# Patient Record
Sex: MALE | Employment: FULL TIME | URBAN - METROPOLITAN AREA
[De-identification: names, ages, dates, MRNs, and addresses within clinical notes are randomized per-mention and may not be internally consistent; named-entity substitution may affect disease eponyms.]

---

## 2018-02-16 ENCOUNTER — TRANSFERRED RECORDS (OUTPATIENT)
Dept: HEALTH INFORMATION MANAGEMENT | Facility: CLINIC | Age: 53
End: 2018-02-16

## 2018-02-16 ENCOUNTER — MEDICAL CORRESPONDENCE (OUTPATIENT)
Dept: HEALTH INFORMATION MANAGEMENT | Facility: CLINIC | Age: 53
End: 2018-02-16

## 2018-02-22 ENCOUNTER — HOSPITAL ENCOUNTER (OUTPATIENT)
Dept: CARDIOLOGY | Facility: CLINIC | Age: 53
Discharge: HOME | End: 2018-02-22
Attending: FAMILY MEDICINE | Admitting: FAMILY MEDICINE
Payer: COMMERCIAL

## 2018-02-22 DIAGNOSIS — I71.21 ASCENDING AORTIC ANEURYSM (H): ICD-10-CM

## 2018-02-22 PROCEDURE — 93306 TTE W/DOPPLER COMPLETE: CPT | Mod: 26 | Performed by: INTERNAL MEDICINE

## 2018-02-22 PROCEDURE — 93306 TTE W/DOPPLER COMPLETE: CPT

## 2018-02-24 ENCOUNTER — MEDICAL CORRESPONDENCE (OUTPATIENT)
Dept: HEALTH INFORMATION MANAGEMENT | Facility: CLINIC | Age: 53
End: 2018-02-24

## 2018-03-01 DIAGNOSIS — I71.21 ASCENDING AORTIC ANEURYSM (H): Primary | ICD-10-CM

## 2018-03-01 PROBLEM — I10 HYPERTENSION: Status: ACTIVE | Noted: 2018-03-01

## 2018-03-01 PROBLEM — Q23.81 BICUSPID AORTIC VALVE: Status: ACTIVE | Noted: 2018-03-01

## 2018-03-01 PROBLEM — Q67.6 PECTUS EXCAVATUM: Status: ACTIVE | Noted: 2018-03-01

## 2018-03-01 RX ORDER — CLOBETASOL PROPIONATE 0.5 MG/G
1 CREAM TOPICAL 2 TIMES DAILY
COMMUNITY
Start: 2015-11-03 | End: 2023-10-24

## 2018-03-01 RX ORDER — ATENOLOL 50 MG/1
50 TABLET ORAL
COMMUNITY
Start: 2017-09-19 | End: 2019-11-01 | Stop reason: ALTCHOICE

## 2018-03-06 DIAGNOSIS — B19.20 HEPATITIS C VIRUS INFECTION WITHOUT HEPATIC COMA, UNSPECIFIED CHRONICITY: Primary | ICD-10-CM

## 2018-03-08 ENCOUNTER — HOSPITAL ENCOUNTER (OUTPATIENT)
Dept: MRI IMAGING | Facility: CLINIC | Age: 53
Discharge: HOME | End: 2018-03-08
Attending: INTERNAL MEDICINE | Admitting: INTERNAL MEDICINE
Payer: COMMERCIAL

## 2018-03-08 DIAGNOSIS — I71.21 ASCENDING AORTIC ANEURYSM (H): ICD-10-CM

## 2018-03-08 PROCEDURE — 71555 MRI ANGIO CHEST W OR W/O DYE: CPT | Mod: 26 | Performed by: INTERNAL MEDICINE

## 2018-03-08 PROCEDURE — 71555 MRI ANGIO CHEST W OR W/O DYE: CPT

## 2018-03-08 PROCEDURE — A9585 GADOBUTROL INJECTION: HCPCS | Performed by: INTERNAL MEDICINE

## 2018-03-08 PROCEDURE — 25000128 H RX IP 250 OP 636: Performed by: INTERNAL MEDICINE

## 2018-03-08 RX ORDER — GADOBUTROL 604.72 MG/ML
10 INJECTION INTRAVENOUS ONCE
Status: COMPLETED | OUTPATIENT
Start: 2018-03-08 | End: 2018-03-08

## 2018-03-08 RX ADMIN — GADOBUTROL 10 ML: 604.72 INJECTION INTRAVENOUS at 12:19

## 2018-03-08 RX ADMIN — GADOBUTROL 10 ML: 604.72 INJECTION INTRAVENOUS at 12:18

## 2018-03-20 ENCOUNTER — OFFICE VISIT (OUTPATIENT)
Dept: CARDIOLOGY | Facility: CLINIC | Age: 53
End: 2018-03-20
Attending: INTERNAL MEDICINE
Payer: COMMERCIAL

## 2018-03-20 VITALS
SYSTOLIC BLOOD PRESSURE: 136 MMHG | DIASTOLIC BLOOD PRESSURE: 90 MMHG | WEIGHT: 177 LBS | BODY MASS INDEX: 23.98 KG/M2 | HEART RATE: 84 BPM | HEIGHT: 72 IN | OXYGEN SATURATION: 98 %

## 2018-03-20 DIAGNOSIS — Q23.1 CONGENITAL INSUFFICIENCY OF AORTIC VALVE: ICD-10-CM

## 2018-03-20 DIAGNOSIS — I71.21 ASCENDING AORTIC ANEURYSM (H): Primary | ICD-10-CM

## 2018-03-20 PROCEDURE — 93005 ELECTROCARDIOGRAM TRACING: CPT | Mod: ZF

## 2018-03-20 PROCEDURE — 93010 ELECTROCARDIOGRAM REPORT: CPT | Mod: ZP | Performed by: INTERNAL MEDICINE

## 2018-03-20 PROCEDURE — 99204 OFFICE O/P NEW MOD 45 MIN: CPT | Mod: ZP | Performed by: INTERNAL MEDICINE

## 2018-03-20 PROCEDURE — G0463 HOSPITAL OUTPT CLINIC VISIT: HCPCS | Mod: 25,ZF

## 2018-03-20 ASSESSMENT — PAIN SCALES - GENERAL: PAINLEVEL: NO PAIN (0)

## 2018-03-20 NOTE — LETTER
3/20/2018      RE: Robson Gutierrez  [address]  [facility] [address]       Dear Colleague,    Thank you for the opportunity to participate in the care of your patient, Robson Gutierrez, at the [facility]. Please see a copy of my visit note below.    HPI:           PAST MEDICAL HISTORY  Past Medical History:   Diagnosis Date     Ascending aortic aneurysm (H) 3/1/2018    4.7 cm on echo         CURRENT MEDICATIONS  Current Outpatient Prescriptions   Medication Sig Dispense Refill     atenolol (TENORMIN) 50 MG tablet Take 50 mg by mouth       clobetasol (TEMOVATE) 0.05 % cream Apply 1 Application topically 2 times daily         PAST SURGICAL HISTORY:  No past surgical history on file.    ALLERGIES   No Known Allergies    FAMILY HISTORY  No family history on file.    VASCULAR FAMILY HISTORY  1st order relative with atherosclerotic PAD:   1st order relative with AAA:     SOCIAL HISTORY  Social History     Social History     Marital status:      Spouse name: N/A     Number of children: N/A     Years of education: N/A     Occupational History     Not on file.     Social History Main Topics     Smoking status: Never Smoker     Smokeless tobacco: Never Used     Alcohol use Yes      Comment: ocassional      Drug use: No     Sexual activity: Not on file     Other Topics Concern     Not on file     Social History Narrative       ROS:   Constitutional: No fever, chills, or sweats. No weight gain/loss   ENT: No visual disturbance, ear ache, epistaxis, sore throat  Allergies/Immunologic: Negative  Respiratory: No cough, hemoptysia  Cardiovascular: As per HPI  GI: No nausea, vomiting, hematemesis, melena, or hematochezia  : No urinary frequency, dysuria, or hematuria  Integument: Negative  Psychiatric: Negative  Neuro: Negative  Endocrinology: Negative   Musculoskeletal: Negative  Vascular: No walking impairment, claudication, ischemic rest pain or  nonhealing wounds    EXAM:  /90 (BP Location: Right arm, Patient Position: Chair)  Pulse 84  Ht 1.829 m (6')  Wt 80.3 kg (177 lb)  SpO2 98%  BMI 24.01 kg/m2  In general, the patient is a pleasant male in no apparent distress.    HEENT: NC/AT.  PERRLA.  EOMI.  Sclerae white, not injected.  Nares clear.  Pharynx without erythema or exudate.  Dentition intact.    Neck: No adenopathy.  No thyromegaly. Carotids +2/2 bilaterally without bruits.  No jugular venous distension.   Heart: RRR. Normal S1, S2 splits physiologically. No murmur, rub, click, or gallop. The PMI is in the 5th ICS in the midclavicular line. There is no heave.    Lungs: CTA.  No ronchi, wheezes, rales.  No dullness to percussion.   Abdomen: Soft, nontender, nondistended. No organomegaly. No AAA.  No bruits.   Extremities: No clubbing, cyanosis, or edema.  No wounds. No varicose veins signs of chronic venous insufficiency.   Vascular: No bruits are noted.        Labs:          CBC RESULTS:  Lab Results   Component Value Date    WBC 7.9 05/23/2011    RBC 5.00 05/23/2011    HGB 14.4 05/23/2011    HCT 42.8 05/23/2011    MCV 86 05/23/2011    MCH 28.8 05/23/2011    MCHC 33.6 05/23/2011    RDW 12.6 05/23/2011     05/23/2011       BMP RESULTS:  Lab Results   Component Value Date     05/23/2011    POTASSIUM 4.1 05/23/2011    CHLORIDE 104 05/23/2011    CO2 29 05/23/2011    ANIONGAP 8 05/23/2011     (H) 05/23/2011    BUN 15 05/23/2011    CR 0.82 05/23/2011    GFRESTIMATED >90 05/23/2011    GFRESTBLACK >90 05/23/2011    HALEY 8.9 05/23/2011        A1C RESULTS:  No results found for: A1C      Procedures:            Assessment and Plan:     -carotids  -coronary cath  -no heavy lifting > 40 lbs  -6 month MRA         Sincerely,     Aretha Arzate MD

## 2018-03-20 NOTE — PROGRESS NOTES
HPI:     This is a 52-year-old male PMH HTN with recently discovered ascending aortic aneurysm (4.7 cm by echo) who is here for new patient visit to vascular cardiology.  He had not yet had an MRA to confirm size prior to this visit thus we ordered an MRA which demonstrated maximum diameter of 4.8 cm.    He has a known history of bicuspid aortic valve.  No known family history of aortopathy or bicuspid valve.  He denies any shortness of breath, chest pain, orthopnea, PND, lower extremity edema.    He has been on atenolol for blood pressure medications.  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  He denies any significant heavy lifting in his daily activities.        PAST MEDICAL HISTORY  Past Medical History:   Diagnosis Date     Ascending aortic aneurysm (H) 3/1/2018    4.7 cm on echo         CURRENT MEDICATIONS  Current Outpatient Prescriptions   Medication Sig Dispense Refill     atenolol (TENORMIN) 50 MG tablet Take 50 mg by mouth       clobetasol (TEMOVATE) 0.05 % cream Apply 1 Application topically 2 times daily         PAST SURGICAL HISTORY:  No past surgical history on file.    ALLERGIES   No Known Allergies    FAMILY HISTORY  No family history on file.    VASCULAR FAMILY HISTORY  1st order relative with atherosclerotic PAD: no  1st order relative with AAA: no    SOCIAL HISTORY  Social History     Social History     Marital status:      Spouse name: N/A     Number of children: N/A     Years of education: N/A     Occupational History     Not on file.     Social History Main Topics     Smoking status: Never Smoker     Smokeless tobacco: Never Used     Alcohol use Yes      Comment: ocassional      Drug use: No     Sexual activity: Not on file     Other Topics Concern     Not on file     Social History Narrative       ROS:   Constitutional: No fever, chills, or sweats. No weight gain/loss   ENT: No visual disturbance, ear ache, epistaxis, sore throat  Allergies/Immunologic:  Negative  Respiratory: No cough, hemoptysia  Cardiovascular: As per HPI  GI: No nausea, vomiting, hematemesis, melena, or hematochezia  : No urinary frequency, dysuria, or hematuria  Integument: Negative  Psychiatric: Negative  Neuro: Negative  Endocrinology: Negative   Musculoskeletal: Negative  Vascular: No walking impairment, claudication, ischemic rest pain or nonhealing wounds    EXAM:  /90 (BP Location: Right arm, Patient Position: Chair)  Pulse 84  Ht 1.829 m (6')  Wt 80.3 kg (177 lb)  SpO2 98%  BMI 24.01 kg/m2  In general, the patient is a pleasant male in no apparent distress.    HEENT: NC/AT.  PERRLA.  EOMI.  Sclerae white, not injected.  Nares clear.  Pharynx without erythema or exudate.  Dentition intact.    Neck: No adenopathy.  No thyromegaly. Carotids +2/2 bilaterally without bruits.  No jugular venous distension.   Heart: RRR. Normal S1, S2 splits physiologically. No murmur, rub, click, or gallop. The PMI is in the 5th ICS in the midclavicular line. There is no heave.    Lungs: CTA.  No ronchi, wheezes, rales.  No dullness to percussion.   Abdomen: Soft, nontender, nondistended. No organomegaly. No AAA.  No bruits.   Extremities: No clubbing, cyanosis, or edema.  No wounds. No varicose veins signs of chronic venous insufficiency.   Vascular: No bruits are noted.        Labs:          CBC RESULTS:  Lab Results   Component Value Date    WBC 7.9 05/23/2011    RBC 5.00 05/23/2011    HGB 14.4 05/23/2011    HCT 42.8 05/23/2011    MCV 86 05/23/2011    MCH 28.8 05/23/2011    MCHC 33.6 05/23/2011    RDW 12.6 05/23/2011     05/23/2011       BMP RESULTS:  Lab Results   Component Value Date     05/23/2011    POTASSIUM 4.1 05/23/2011    CHLORIDE 104 05/23/2011    CO2 29 05/23/2011    ANIONGAP 8 05/23/2011     (H) 05/23/2011    BUN 15 05/23/2011    CR 0.82 05/23/2011    GFRESTIMATED >90 05/23/2011    GFRESTBLACK >90 05/23/2011    HALEY 8.9 05/23/2011        A1C RESULTS:  No results  found for: A1C      Procedures:      ECHOCARDIOGRAM 2/22/2018  Interpretation Summary  The aortic valve is bicuspid. The aorta is dilated and measures 47 mm at the  level of the proximal ascending segment (Z-score +4.25.) The aortic root,  indexed to BSA is 23.41 mm/m2 (normal, men: 15+/-2 mm/m2; normal, women: 16+/-  3 mm/m2.)  Left ventricular size is normal. The Ejection Fraction is estimated at 55-60%.  No regional wall motion abnormalities are seen.  The right ventricle is normal size. Global right ventricular function is  normal.  The inferior vena cava was normal in size with preserved respiratory  variability. Estimated mean right atrial pressure is 3 mmHg.  No pericardial effusion is present.  Previous study not available for comparison.  Consider CT or MRA for definitive sizing of the aorta and referral to  cardiology.  _____________________________________________________________________________        Assessment and Plan:       This is a pleasant 52-year-old male with past medical history hypertension aortic valve, ascending aortic aneurysm of 4.8 cm with no coarctation here to establish care and vascular cardiology.    Since we have no documentation of stability of size we will obtain an MRA at the six-month follow-up.  If size increases to over 5.0 cm, then well refer to my surgical colleagues for careful planning of likely valve-sparing root repair.   If stable at 4.8 cm then can likely continue careful surveillance.    Plan:  -Carotid duplex  -Eventual coronary catheterization   -No heavy lifting > 30-40 lbs  -MRA Chest/A/P at 6 months (Friday preferred)  -Yearly-q2year echocardiogram for valve assessment  -Recommend screening children for bicuspid aortopathy by echocardiogram.    Total time spent 60 minutes, of which >50% was spent in face-to-face patient evaluation, reviewing data with patient, and coordination of care.     Aretha Arzate MD MSc  Division of Cardiology  Vascular Medicine  Section   Aortopathy Clinic  [facility]

## 2018-03-20 NOTE — MR AVS SNAPSHOT
After Visit Summary   3/20/2018    Robson Gutierrez    MRN: [id number]           Patient Information     Date Of Birth          1965        Visit Information        Provider Department      3/20/2018 5:00 PM Aretha Arzate MD [facility] CARDIOVASCULAR      Today's Diagnoses     Ascending aortic aneurysm (H)    -  1    Congenital insufficiency of aortic valve          Care Instructions    You were seen today in the Cardiovascular Clinic at the [facility].      Cardiology Providers you saw during your visit:  Dr. Arzate    Diagnosis:  Aortic Aneurysm    Results:  EKG: Normal sinus rythm    Recommendations:   6 month MRA    Follow-up: Follow up with Carito walsh pending MRA       For emergencies call 911.    For any scheduling needs, please call [phone]. Option 1 then option 3    Thank you for your visit today!     Please call if you have any questions or concerns.  Richard Evangelista RN                      Follow-ups after your visit        Additional Services     Follow-Up with Vascular Cardiologist       Visit to be scheduled after MRA chest @ 6 months                  Your next 10 appointments already scheduled     Apr 19, 2018  7:45 AM CDT   LAB with  LAB   [facility] ([facility])    [address] [place] [address] Floor  [facility] [address] [phone]           Please do not eat 10-12 hours before your appointment if you are coming in fasting for labs on lipids, cholesterol, or glucose (sugar). This does not apply to pregnant women. Water, hot tea and black coffee (with nothing added) are okay. Do not drink other fluids, diet soda or chew gum.            Apr 19, 2018  8:40 AM CDT   (Arrive by 8:25 AM)   New General Liver with Olaf Greer MD   [facility] Hepatology ([facility])    [address] [facility] [address]  [address] [place] [address] [phone]              Future tests that were ordered for you today   "   Open Future Orders        Priority Expected Expires Ordered    Follow-Up with Vascular Cardiologist Routine 2018 2019 3/20/2018    MRI Angiogram chest w & w/o contrast Routine 2018 3/20/2019 3/20/2018            Who to contact     If you have questions or need follow up information about today's clinic visit or your schedule please contact [facility] directly at [phone].  Normal or non-critical lab and imaging results will be communicated to you by [employer]hart, letter or phone within 4 business days after the clinic has received the results. If you do not hear from us within 7 days, please contact the clinic through [employer] or phone. If you have a critical or abnormal lab result, we will notify you by phone as soon as possible.  Submit refill requests through Padlet or call your pharmacy and they will forward the refill request to us. Please allow 3 business days for your refill to be completed.          Additional Information About Your Visit        Padlet Information     Padlet lets you send messages to your doctor, view your test results, renew your prescriptions, schedule appointments and more. To sign up, go to [email or web address] . Click on \"Log in\" on the left side of the screen, which will take you to the Welcome page. Then click on \"Sign up Now\" on the right side of the page.     You will be asked to enter the access code listed below, as well as some personal information. Please follow the directions to create your username and password.     Your access code is: [email or web address]  Expires: 2018  7:30 AM     Your access code will  in 90 days. If you need help or a new code, please call your [place] clinic or [phone].        Care EveryWhere ID     This is your Care EveryWhere ID. This could be used by other organizations to access your [place] medical records  [id number]        Your Vitals Were     Pulse Height Pulse Oximetry BMI (Body Mass Index)          84 " 1.829 m (6') 98% 24.01 kg/m2         Blood Pressure from Last 3 Encounters:   03/20/18 136/90    Weight from Last 3 Encounters:   03/20/18 80.3 kg (177 lb)              We Performed the Following     EKG 12-lead, tracing only (Same Day)        Primary Care Provider    None Specified       No primary provider on file.        Equal Access to Services     [facility]: Hadii aad ku hadasho Soomaali, waaxda luqadaha, qaybta kaalmada adecitlalyyada, waxay idiin haycarlyaileen martelllupiskatherine nugent . So [facility] [phone].    ATENCIÓN: Si habla español, tiene a campos disposición servicios gratuitos de asistencia lingüística. Llame al [phone].    We comply with applicable federal civil rights laws and [place] laws. We do not discriminate on the basis of race, color, national origin, age, disability, sex, sexual orientation, or gender identity.            Thank you!     Thank you for choosing [facility]  for your care. Our goal is always to provide you with excellent care. Hearing back from our patients is one way we can continue to improve our services. Please take a few minutes to complete the written survey that you may receive in the mail after your visit with us. Thank you!             Your Updated Medication List - Protect others around you: Learn how to safely use, store and throw away your medicines at www.disposemymeds.org.          This list is accurate as of 3/20/18  6:04 PM.  Always use your most recent med list.                   Brand Name Dispense Instructions for use Diagnosis    atenolol 50 MG tablet    TENORMIN     Take 50 mg by mouth        clobetasol 0.05 % cream    TEMOVATE     Apply 1 Application topically 2 times daily

## 2018-03-20 NOTE — NURSING NOTE
Chief Complaint   Patient presents with     New Patient      51 y/o male for initial evaluation of ascending aortic aneurysm. [facility] referral     Vitals were taken and medications were reconciled. EKG was performed    Malini LIMA  4:54 PM

## 2018-03-20 NOTE — PATIENT INSTRUCTIONS
You were seen today in the Cardiovascular Clinic at the [facility].      Cardiology Providers you saw during your visit:  Dr. Arzate    Diagnosis:  Aortic Aneurysm    Results:  EKG: Normal sinus rythm    Recommendations:   6 month MRA    Follow-up: Follow up with Carito walsh pending MRA       For emergencies call [phone].    For any scheduling needs, please call [phone]. Option 1 then option 3    Thank you for your visit today!     Please call if you have any questions or concerns.  Richard Evangelista RN

## 2018-03-21 LAB — INTERPRETATION ECG - MUSE: NORMAL

## 2018-04-19 ENCOUNTER — OFFICE VISIT (OUTPATIENT)
Dept: GASTROENTEROLOGY | Facility: CLINIC | Age: 53
End: 2018-04-19
Attending: INTERNAL MEDICINE
Payer: COMMERCIAL

## 2018-04-19 VITALS
HEIGHT: 72 IN | WEIGHT: 176.4 LBS | TEMPERATURE: 97.9 F | BODY MASS INDEX: 23.89 KG/M2 | SYSTOLIC BLOOD PRESSURE: 114 MMHG | DIASTOLIC BLOOD PRESSURE: 80 MMHG | HEART RATE: 74 BPM

## 2018-04-19 DIAGNOSIS — B19.20 HEPATITIS C VIRUS INFECTION WITHOUT HEPATIC COMA, UNSPECIFIED CHRONICITY: ICD-10-CM

## 2018-04-19 DIAGNOSIS — R79.89 ABNORMALITY IN OTHER LIVER FUNCTION TEST: Primary | ICD-10-CM

## 2018-04-19 LAB
ALBUMIN SERPL-MCNC: 4 G/DL (ref 3.4–5)
ALP SERPL-CCNC: 91 U/L (ref 40–150)
ALT SERPL W P-5'-P-CCNC: 22 U/L (ref 0–70)
ANION GAP SERPL CALCULATED.3IONS-SCNC: 6 MMOL/L (ref 3–14)
AST SERPL W P-5'-P-CCNC: 15 U/L (ref 0–45)
BILIRUB DIRECT SERPL-MCNC: 0.1 MG/DL (ref 0–0.2)
BILIRUB SERPL-MCNC: 0.4 MG/DL (ref 0.2–1.3)
BUN SERPL-MCNC: 20 MG/DL (ref 7–30)
CALCIUM SERPL-MCNC: 9.2 MG/DL (ref 8.5–10.1)
CHLORIDE SERPL-SCNC: 107 MMOL/L (ref 94–109)
CO2 SERPL-SCNC: 29 MMOL/L (ref 20–32)
CREAT SERPL-MCNC: 0.85 MG/DL (ref 0.66–1.25)
ERYTHROCYTE [DISTWIDTH] IN BLOOD BY AUTOMATED COUNT: 12 % (ref 10–15)
GFR SERPL CREATININE-BSD FRML MDRD: >90 ML/MIN/1.7M2
GLUCOSE SERPL-MCNC: 98 MG/DL (ref 70–99)
HAV IGG SER QL IA: REACTIVE
HBV CORE AB SERPL QL IA: NONREACTIVE
HBV SURFACE AB SERPL IA-ACNC: 0.11 M[IU]/ML
HBV SURFACE AG SERPL QL IA: NONREACTIVE
HCT VFR BLD AUTO: 42.5 % (ref 40–53)
HGB BLD-MCNC: 14.7 G/DL (ref 13.3–17.7)
INR PPP: 1.06 (ref 0.86–1.14)
MCH RBC QN AUTO: 31 PG (ref 26.5–33)
MCHC RBC AUTO-ENTMCNC: 34.6 G/DL (ref 31.5–36.5)
MCV RBC AUTO: 90 FL (ref 78–100)
PLATELET # BLD AUTO: 183 10E9/L (ref 150–450)
POTASSIUM SERPL-SCNC: 4.4 MMOL/L (ref 3.4–5.3)
PROT SERPL-MCNC: 8.2 G/DL (ref 6.8–8.8)
RBC # BLD AUTO: 4.74 10E12/L (ref 4.4–5.9)
SODIUM SERPL-SCNC: 143 MMOL/L (ref 133–144)
WBC # BLD AUTO: 5.1 10E9/L (ref 4–11)

## 2018-04-19 PROCEDURE — 85027 COMPLETE CBC AUTOMATED: CPT | Performed by: INTERNAL MEDICINE

## 2018-04-19 PROCEDURE — 36415 COLL VENOUS BLD VENIPUNCTURE: CPT | Performed by: INTERNAL MEDICINE

## 2018-04-19 PROCEDURE — 86704 HEP B CORE ANTIBODY TOTAL: CPT | Performed by: INTERNAL MEDICINE

## 2018-04-19 PROCEDURE — 86708 HEPATITIS A ANTIBODY: CPT | Performed by: INTERNAL MEDICINE

## 2018-04-19 PROCEDURE — 86706 HEP B SURFACE ANTIBODY: CPT | Performed by: INTERNAL MEDICINE

## 2018-04-19 PROCEDURE — 87340 HEPATITIS B SURFACE AG IA: CPT | Performed by: INTERNAL MEDICINE

## 2018-04-19 PROCEDURE — 87522 HEPATITIS C REVRS TRNSCRPJ: CPT | Performed by: INTERNAL MEDICINE

## 2018-04-19 PROCEDURE — G0463 HOSPITAL OUTPT CLINIC VISIT: HCPCS | Mod: ZF

## 2018-04-19 PROCEDURE — 80048 BASIC METABOLIC PNL TOTAL CA: CPT | Performed by: INTERNAL MEDICINE

## 2018-04-19 PROCEDURE — 85610 PROTHROMBIN TIME: CPT | Performed by: INTERNAL MEDICINE

## 2018-04-19 PROCEDURE — 80076 HEPATIC FUNCTION PANEL: CPT | Performed by: INTERNAL MEDICINE

## 2018-04-19 PROCEDURE — 87902 NFCT AGT GNTYP ALYS HEP C: CPT | Performed by: INTERNAL MEDICINE

## 2018-04-19 ASSESSMENT — PAIN SCALES - GENERAL: PAINLEVEL: NO PAIN (0)

## 2018-04-19 NOTE — MR AVS SNAPSHOT
After Visit Summary   4/19/2018    Robson Gutierrez    MRN: [id number]           Patient Information     Date Of Birth          1965        Visit Information        Provider Department      4/19/2018 8:40 AM Olaf Greer MD [facility] Hepatology        Today's Diagnoses     Abnormality in other liver function test    -  1       Follow-ups after your visit        Follow-up notes from your care team     Return if symptoms worsen or fail to improve.      Your next 10 appointments already scheduled     Sep 17, 2018 10:00 AM CDT   (Arrive by 9:45 AM)   MR CHEST W/O & W CONTRAST ANGIOGRAM with UUMR4   [facility] [place], MRI ([facility])    [address] [address] [phone]           Take your medicines as usual, unless your doctor tells you not to. Bring a list of your current medicines to your exam (including vitamins, minerals and over-the-counter drugs).  You may or may not receive intravenous (IV) contrast for this exam pending the discretion of the Radiologist.  You do not need to do anything special to prepare.  The MRI machine uses a strong magnet. Please wear clothes without metal (snaps, zippers). A sweatsuit works well, or we may give you a hospital gown.  Please remove any body piercings and hair extensions before you arrive. You will also remove watches, jewelry, hairpins, wallets, dentures, partial dental plates and hearing aids. You may wear contact lenses, and you may be able to wear your rings. We have a safe place to keep your personal items, but it is safer to leave them at home.  **IMPORTANT** THE INSTRUCTIONS BELOW ARE ONLY FOR THOSE PATIENTS WHO HAVE BEEN PRESCRIBED SEDATION OR GENERAL ANESTHESIA DURING THEIR MRI PROCEDURE:  IF YOUR DOCTOR PRESCRIBED ORAL SEDATION (take medicine to help you relax during your exam):   You must get the medicine from your doctor (oral medication) before you arrive. Bring the  medicine to the exam. Do not take it at home. You ll be told when to take it upon arriving for your exam.   Arrive one hour early. Bring someone who can take you home after the test. Your medicine will make you sleepy. After the exam, you may not drive, take a bus or take a taxi by yourself.  IF YOUR DOCTOR PRESCRIBED IV SEDATION:   Arrive one hour early. Bring someone who can take you home after the test. Your medicine will make you sleepy. After the exam, you may not drive, take a bus or take a taxi by yourself.   No eating 6 hours before your exam. You may have clear liquids up until 4 hours before your exam. (Clear liquids include water, clear tea, black coffee and fruit juice without pulp.)  IF YOUR DOCTOR PRESCRIBED ANESTHESIA (be asleep for your exam):   Arrive 1 1/2 hours early. Bring someone who can take you home after the test. You may not drive, take a bus or take a taxi by yourself.   No eating 8 hours before your exam. You may have clear liquids up until 4 hours before your exam. (Clear liquids include water, clear tea, black coffee and fruit juice without pulp.)   You will spend four to five hours in the recovery room.  Please call the Imaging Department at your exam site with any questions.              Who to contact     If you have questions or need follow up information about today's clinic visit or your schedule please contact [facility] HEPATOLOG[facility] directly at [phone].  Normal or non-critical lab and imaging results will be communicated to you by MyChart, letter or phone within 4 business days after the clinic has received the results. If you do not hear from us within 7 days, please contact the clinic through [employer] or phone. If you have a critical or abnormal lab result, we will notify you by phone as soon as possible.  Submit refill requests through K[employer] or call your pharmacy and they will forward the refill request to us. Please allow 3 business days for your refill to be completed.     "      Additional Information About Your Visit        MyChart Information     S[employer] lets you send messages to your doctor, view your test results, renew your prescriptions, schedule appointments and more. To sign up, go to [email or web address] . Click on \"Log in\" on the left side of the screen, which will take you to the Welcome page. Then click on \"Sign up Now\" on the right side of the page.     You will be asked to enter the access code listed below, as well as some personal information. Please follow the directions to create your username and password.     Your access code is: [email or web address]  Expires: 2018  7:30 AM     Your access code will  in 90 days. If you need help or a new code, please call your [place] clinic or [phone].        Care EveryWhere ID     This is your Care EveryWhere ID. This could be used by other organizations to access your [place] medical records  [id number]        Your Vitals Were     Pulse Temperature Height BMI (Body Mass Index)          74 97.9  F (36.6  C) (Oral) 1.829 m (6') 23.92 kg/m2         Blood Pressure from Last 3 Encounters:   18 114/80   18 136/90    Weight from Last 3 Encounters:   18 80 kg (176 lb 6.4 oz)   18 80.3 kg (177 lb)              Today, you had the following     No orders found for display       Primary Care Provider Fax #    Provider Not In System [phone]                Equal Access to Services     [facility]: Hadii aad ku hadasho Soomaali, waaxda luqadaha, qaybta kaalmada adeegyada, waxay sophie nugent . So [facility] [phone].    ATENCIÓN: Si habla español, tiene a campos disposición servicios gratuitos de asistencia lingüística. Llame al [phone].    We comply with applicable federal civil rights laws and [place] laws. We do not discriminate on the basis of race, color, national origin, age, disability, sex, sexual orientation, or gender identity.            Thank you!     Thank you for " choosing [facility] HEPATOLOGY  for your care. Our goal is always to provide you with excellent care. Hearing back from our patients is one way we can continue to improve our services. Please take a few minutes to complete the written survey that you may receive in the mail after your visit with us. Thank you!             Your Updated Medication List - Protect others around you: Learn how to safely use, store and throw away your medicines at www.disposemymeds.org.          This list is accurate as of 4/19/18 11:59 PM.  Always use your most recent med list.                   Brand Name Dispense Instructions for use Diagnosis    atenolol 50 MG tablet    TENORMIN     Take 50 mg by mouth        clobetasol 0.05 % cream    TEMOVATE     Apply 1 Application topically 2 times daily

## 2018-04-19 NOTE — LETTER
4/19/2018       RE: Robson Gutierrez  [address]  [facility] [address]     Dear Colleague,    Thank you for referring your patient, Robson Gutierrez, to the [facility] HEPATOLOGY at [facility]. Please see a copy of my visit note below.    [facility]    Hepatology New Patient Visit    Referring provider:  Syeda Castro *      52 year old male    Chief complaint: Hep C antibody +      HPI: I had the pleasure of seeing Mr. Robson Gutierrez in the [facility] on 04/19/2018 with Dr. Nahun Pizano.  Mr. Gutierrez is a 52-year-old gentleman originally from [place] who is referred to the Hepatology Clinic for evaluation of positive hepatitis C antibody.  He was seen for a well patient exam at [facility] in 02/2018 at which time during formative routine testing was found to have positive hepatitis C antibody.  It is worth noting that the antibody was low titer positive and measured at [place] at 3.  He did subsequently have PCR testing which did not reveal any hepatitis C virus in his blood.  He says that he currently feels quite well and has no complaints.  He specifically denies any jaundice, abdominal distention, lower extremity edema, lethargy or confusion.  He has not had any overt GI bleeding and specifically denies any melena, hematemesis or hematochezia.  He has not had any systemic symptoms, specifically denies any fevers, chills, sweats or weight loss.   Dictated by Olaf Greer MD, Fellow.       No transfusions, surgeries or other interventions prior to 1990s. No injectable or intranasal drug use. No family members or close contacts with Hep C.     Medical hx Surgical hx   Past Medical History:   Diagnosis Date     Ascending aortic aneurysm (H) 3/1/2018      History reviewed. No pertinent surgical history.       Medications  Prior to Admission medications    Medication Sig Start Date End Date Taking?  Authorizing Provider   atenolol (TENORMIN) 50 MG tablet Take 50 mg by mouth 9/19/17  Yes Reported, Patient   clobetasol (TEMOVATE) 0.05 % cream Apply 1 Application topically 2 times daily 11/3/15   Reported, Patient       Allergies  No Known Allergies    Family hx Social hx   History reviewed. No pertinent family history.  No family history of liver disease, GI malignancies  Social History   Substance Use Topics     Smoking status: Never Smoker     Smokeless tobacco: Never Used     Alcohol use Yes      Comment: ocassional           Review of systems  A 10-point review of systems was negative.    Examination  /80  Pulse 74  Temp 97.9  F (36.6  C) (Oral)  Ht 1.829 m (6')  Wt 80 kg (176 lb 6.4 oz)  BMI 23.92 kg/m2  Body mass index is 23.92 kg/(m^2).    Gen- well, NAD, A+Ox3, normal color  Eye- EOMI  ENT- MMM, normal oropharynx  Lym- no palpable lymphadenopathy  CVS- S1, S2 normal, no added sounds, RRR  RS- CTA  Abd- soft, NT, ND, BS+, no palpable organomegaly   Extr- pulses good, no RAAD  MS- hands normal- no clubbing  Neuro- A+Ox3, no asterixis  Skin- no rash or jaundice  Psych- normal mood    Laboratory  Lab Results   Component Value Date     04/19/2018    POTASSIUM 4.4 04/19/2018    CHLORIDE 107 04/19/2018    CO2 29 04/19/2018    BUN 20 04/19/2018    CR 0.85 04/19/2018       Lab Results   Component Value Date    BILITOTAL 0.4 04/19/2018    ALT 22 04/19/2018    AST 15 04/19/2018    ALKPHOS 91 04/19/2018       Lab Results   Component Value Date    ALBUMIN 4.0 04/19/2018    PROTTOTAL 8.2 04/19/2018        Lab Results   Component Value Date    WBC 5.1 04/19/2018    HGB 14.7 04/19/2018    MCV 90 04/19/2018     04/19/2018       Lab Results   Component Value Date    INR 1.06 04/19/2018         Radiology n/a    Assessment:  52 year old male with ascending aortic aneurysm and + Hep C antibody who presents to clinic for evaluation of + Hep C Antibody. Of note, the Ab titer is quite low and he is PCR  negative. I suspect this is likely a false + test as he has no traditional risk factors for acquisition of Hep C.    Plan  -High resolution testing for Hep C. If negative, no f/u needed.     The patient was seen and examined.  The above assessment and plan was developed jointly with the fellow.      Nahun Pizano MD      Professor of Medicine  [employer]      Executive Medical Director, Solid Organ Transplant Program  [facility]

## 2018-04-19 NOTE — PROGRESS NOTES
[facility]    Hepatology New Patient Visit    Referring provider:  Syeda Castro *      52 year old male    Chief complaint: Hep C antibody +      HPI: I had the pleasure of seeing Mr. Robson Gutierrez in the [facility] on 04/19/2018 with Dr. Nahun Pizano.  Mr. Gutierrez is a 52-year-old gentleman originally from [place] who is referred to the Hepatology Clinic for evaluation of positive hepatitis C antibody.  He was seen for a well patient exam at [facility] in 02/2018 at which time during formative routine testing was found to have positive hepatitis C antibody.  It is worth noting that the antibody was low titer positive and measured at [place] at 3.  He did subsequently have PCR testing which did not reveal any hepatitis C virus in his blood.  He says that he currently feels quite well and has no complaints.  He specifically denies any jaundice, abdominal distention, lower extremity edema, lethargy or confusion.  He has not had any overt GI bleeding and specifically denies any melena, hematemesis or hematochezia.  He has not had any systemic symptoms, specifically denies any fevers, chills, sweats or weight loss.   Dictated by Olaf Greer MD, Fellow.       No transfusions, surgeries or other interventions prior to 1990s. No injectable or intranasal drug use. No family members or close contacts with Hep C.     Medical hx Surgical hx   Past Medical History:   Diagnosis Date     Ascending aortic aneurysm (H) 3/1/2018      History reviewed. No pertinent surgical history.       Medications  Prior to Admission medications    Medication Sig Start Date End Date Taking? Authorizing Provider   atenolol (TENORMIN) 50 MG tablet Take 50 mg by mouth 9/19/17  Yes Reported, Patient   clobetasol (TEMOVATE) 0.05 % cream Apply 1 Application topically 2 times daily 11/3/15   Reported, Patient       Allergies  No Known Allergies    Family hx Social hx   History  reviewed. No pertinent family history.  No family history of liver disease, GI malignancies  Social History   Substance Use Topics     Smoking status: Never Smoker     Smokeless tobacco: Never Used     Alcohol use Yes      Comment: ocassional           Review of systems  A 10-point review of systems was negative.    Examination  /80  Pulse 74  Temp 97.9  F (36.6  C) (Oral)  Ht 1.829 m (6')  Wt 80 kg (176 lb 6.4 oz)  BMI 23.92 kg/m2  Body mass index is 23.92 kg/(m^2).    Gen- well, NAD, A+Ox3, normal color  Eye- EOMI  ENT- MMM, normal oropharynx  Lym- no palpable lymphadenopathy  CVS- S1, S2 normal, no added sounds, RRR  RS- CTA  Abd- soft, NT, ND, BS+, no palpable organomegaly   Extr- pulses good, no RAAD  MS- hands normal- no clubbing  Neuro- A+Ox3, no asterixis  Skin- no rash or jaundice  Psych- normal mood    Laboratory  Lab Results   Component Value Date     04/19/2018    POTASSIUM 4.4 04/19/2018    CHLORIDE 107 04/19/2018    CO2 29 04/19/2018    BUN 20 04/19/2018    CR 0.85 04/19/2018       Lab Results   Component Value Date    BILITOTAL 0.4 04/19/2018    ALT 22 04/19/2018    AST 15 04/19/2018    ALKPHOS 91 04/19/2018       Lab Results   Component Value Date    ALBUMIN 4.0 04/19/2018    PROTTOTAL 8.2 04/19/2018        Lab Results   Component Value Date    WBC 5.1 04/19/2018    HGB 14.7 04/19/2018    MCV 90 04/19/2018     04/19/2018       Lab Results   Component Value Date    INR 1.06 04/19/2018         Radiology n/a    Assessment:  52 year old male with ascending aortic aneurysm and + Hep C antibody who presents to clinic for evaluation of + Hep C Antibody. Of note, the Ab titer is quite low and he is PCR negative. I suspect this is likely a false + test as he has no traditional risk factors for acquisition of Hep C.    Plan  -High resolution testing for Hep C. If negative, no f/u needed.     The patient was seen and examined.  The above assessment and plan was developed jointly with the  fellow.      Nhaun Pizano MD      Professor of Medicine  [employer]      Executive Medical Director, Solid Organ Transplant Program  [facility]

## 2018-04-19 NOTE — NURSING NOTE
Chief Complaint   Patient presents with     Consult     establish care with Hep C, tzimmer cma       Initial /80  Pulse 74  Temp 97.9  F (36.6  C) (Oral)  Ht 1.829 m (6')  Wt 80 kg (176 lb 6.4 oz)  BMI 23.92 kg/m2 Estimated body mass index is 23.92 kg/(m^2) as calculated from the following:    Height as of this encounter: 1.829 m (6').    Weight as of this encounter: 80 kg (176 lb 6.4 oz).  Medication Reconciliation: complete

## 2018-04-20 LAB
HCV RNA SERPL NAA+PROBE-ACNC: NORMAL [IU]/ML
HCV RNA SERPL NAA+PROBE-LOG IU: NORMAL LOG IU/ML

## 2018-04-25 LAB — HCV GENTYP SERPL NAA+PROBE: NORMAL

## 2018-09-17 ENCOUNTER — HOSPITAL ENCOUNTER (OUTPATIENT)
Dept: MRI IMAGING | Facility: CLINIC | Age: 53
Discharge: HOME | End: 2018-09-17
Attending: INTERNAL MEDICINE | Admitting: INTERNAL MEDICINE
Payer: COMMERCIAL

## 2018-09-17 DIAGNOSIS — Q23.1 CONGENITAL INSUFFICIENCY OF AORTIC VALVE: ICD-10-CM

## 2018-09-17 DIAGNOSIS — I71.21 ASCENDING AORTIC ANEURYSM (H): ICD-10-CM

## 2018-09-17 PROCEDURE — A9585 GADOBUTROL INJECTION: HCPCS | Performed by: INTERNAL MEDICINE

## 2018-09-17 PROCEDURE — 25500064 ZZH RX 255 OP 636: Performed by: INTERNAL MEDICINE

## 2018-09-17 PROCEDURE — 71555 MRI ANGIO CHEST W OR W/O DYE: CPT

## 2018-09-17 RX ORDER — GADOBUTROL 604.72 MG/ML
7.5 INJECTION INTRAVENOUS ONCE
Status: COMPLETED | OUTPATIENT
Start: 2018-09-17 | End: 2018-09-17

## 2018-09-17 RX ADMIN — GADOBUTROL 7.5 ML: 604.72 INJECTION INTRAVENOUS at 10:04

## 2018-09-17 RX ADMIN — GADOBUTROL 7.5 ML: 604.72 INJECTION INTRAVENOUS at 10:05

## 2018-10-02 ENCOUNTER — OFFICE VISIT (OUTPATIENT)
Dept: CARDIOLOGY | Facility: CLINIC | Age: 53
End: 2018-10-02
Attending: INTERNAL MEDICINE
Payer: COMMERCIAL

## 2018-10-02 VITALS
WEIGHT: 173.6 LBS | BODY MASS INDEX: 23.51 KG/M2 | HEART RATE: 64 BPM | HEIGHT: 72 IN | DIASTOLIC BLOOD PRESSURE: 87 MMHG | SYSTOLIC BLOOD PRESSURE: 128 MMHG | OXYGEN SATURATION: 97 %

## 2018-10-02 DIAGNOSIS — Q23.1 CONGENITAL INSUFFICIENCY OF AORTIC VALVE: ICD-10-CM

## 2018-10-02 DIAGNOSIS — I65.29 ASYMPTOMATIC CAROTID ARTERY STENOSIS, UNSPECIFIED LATERALITY: Primary | ICD-10-CM

## 2018-10-02 DIAGNOSIS — I71.21 ASCENDING AORTIC ANEURYSM (H): ICD-10-CM

## 2018-10-02 PROCEDURE — 99215 OFFICE O/P EST HI 40 MIN: CPT | Mod: GC | Performed by: INTERNAL MEDICINE

## 2018-10-02 PROCEDURE — G0463 HOSPITAL OUTPT CLINIC VISIT: HCPCS | Mod: 25,ZF

## 2018-10-02 ASSESSMENT — PAIN SCALES - GENERAL: PAINLEVEL: NO PAIN (0)

## 2018-10-02 NOTE — NURSING NOTE
Chief Complaint   Patient presents with     Follow Up For     54 y/o male for 6 month follow up of ascending aortic aneurysm.     Vitals were taken and medications were reconciled.     Malini Suarez RMA  11:56 AM

## 2018-10-02 NOTE — PROGRESS NOTES
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 Patient has no cardiopulmonary symptoms at this time. Of note, his mother has an aneurysm of her basilar artery recently diagnosed. No history of BAV or TAA in the family. He is currently on Atenolol 50mg once a day and SBP range between 120-130.     MRAs performed, serially 4.6 cm, stable.    PAST MEDICAL HISTORY  Past Medical History:   Diagnosis Date     Ascending aortic aneurysm (H) 3/1/2018    4.7 cm on echo         CURRENT MEDICATIONS  Current Outpatient Prescriptions   Medication Sig Dispense Refill     atenolol (TENORMIN) 50 MG tablet Take 50 mg by mouth       clobetasol (TEMOVATE) 0.05 % cream Apply 1 Application topically 2 times daily         PAST SURGICAL HISTORY:  No past surgical history on file.    ALLERGIES   No Known Allergies    FAMILY HISTORY  No family history on file.    VASCULAR FAMILY HISTORY  1st order relative with atherosclerotic PAD: no  1st order relative with AAA: no    SOCIAL HISTORY  Social History     Social History     Marital status:      Spouse name: N/A     Number of children: N/A     Years of education: N/A     Occupational History     Not on file.     Social History Main Topics     Smoking status: Never Smoker     Smokeless tobacco: Never Used     Alcohol use Yes      Comment: ocassional      Drug use: No     Sexual activity: Not on file     Other Topics Concern     Not on file     Social History Narrative       ROS:   Negative unless stated in HPI     EXAM:  /87 (BP Location: Left arm, Patient Position: Chair, Cuff Size: Adult Regular)  Pulse 64  Ht 1.829 m (6')  Wt 78.7 kg (173 lb 9.6 oz)  SpO2 97%  BMI 23.54 kg/m2  General: No acute distress   HEENT: NC/AT. No Carotid Bruits.   CV: RRR, +S1/S2, No murmurs, rubs, gallops.   Pulm: Unlabored breathing, CTAB   GI: s/nt/nd   Ext: No edema   Neuro:  No focal defects   Psych: Normal Affect    Labs:          CBC RESULTS:  Lab Results   Component Value Date    WBC 5.1 04/19/2018    RBC 4.74 04/19/2018    HGB 14.7 04/19/2018    HCT 42.5 04/19/2018    MCV 90 04/19/2018    MCH 31.0 04/19/2018    MCHC 34.6 04/19/2018    RDW 12.0 04/19/2018     04/19/2018       BMP RESULTS:  Lab Results   Component Value Date     04/19/2018    POTASSIUM 4.4 04/19/2018    CHLORIDE 107 04/19/2018    CO2 29 04/19/2018    ANIONGAP 6 04/19/2018    GLC 98 04/19/2018    BUN 20 04/19/2018    CR 0.85 04/19/2018    GFRESTIMATED >90 04/19/2018    GFRESTBLACK >90 04/19/2018    HALEY 9.2 04/19/2018        A1C RESULTS:  No results found for: A1C      Procedures:    MRA on 09/2018      Clinical history: 53M with BAV and ascending aortic aneurysm by echo presents for elective outpatient MRA  to evaluate aortic dimension.  Comparison MRA: None     1. Mild dilation of the ascending aorta, 4.4cm in its maximum biorthogonal dimension.     2. The remainder of the aortic root, transverse arch and descending thoracic aorta are normal in size  without an aneurysm or dissection.     2. The aortic arch is left sided. There is normal branching of the arch vessels. There is no coarctation.     3. The main and proximal branch pulmonary arteries are normal in size.      4. The pulmonary venous connections are normal.     CONCLUSIONS: Mild dilation of the ascending aorta measuring 4.4 cm.      ECHOCARDIOGRAM 2/22/2018  Interpretation Summary  The aortic valve is bicuspid. The aorta is dilated and measures 47 mm at the  level of the proximal ascending segment (Z-score +4.25.) The aortic root,  indexed to BSA is 23.41 mm/m2 (normal, men: 15+/-2 mm/m2; normal, women: 16+/-  3 mm/m2.)  Left ventricular size is normal. The Ejection Fraction is estimated at 55-60%.  No regional wall motion abnormalities are seen.  The right ventricle is normal size. Global right ventricular function is  normal.  The inferior  vena cava was normal in size with preserved respiratory  variability. Estimated mean right atrial pressure is 3 mmHg.  No pericardial effusion is present.  Previous study not available for comparison.  Consider CT or MRA for definitive sizing of the aorta and referral to  cardiology.  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    #Thoracic Aortic Aneurysm  -Patient's MRA in 03/2018 showed an aortic size of 4.8 cm. Repeat MRA in 09/2018 showed a decrease in size to 4.4 cm. On personal view, patient's aorta is still likely around 4.8 cm in a cross-sectional view. Therefore, I believe the aortic size is stable but has not decreased in size. Confirmed with MRI director size is 4.6 cm, stable.  -Will likely need repeat MRA Chest/Abdomen/Pelvis in 6 months.  -Will consider surgical repair at 5 cm. At this point, it will be valve sparing. Given that he is likely 4.8 cm, will obtain carotid duplex and coronary angiogram after next visit.     #Bicupsid Aortic Valve  -Fusion of RCC and LCC   -No evidence of stenosis of regurgitation on ECHO in 2/2018  -Consider repeat ECHO before surgery to ensure no need for valve replacement surgery.    -Recommend screening of children and 1st degree relatives for BAV by ECHO.     Porter Paul   Cardiology Fellow   Pager: [phone]    ATTENDING ATTESTATION    Patient was seen and evaluated in clinic today with the cardiology fellow. The note has been edited to reflect the history taking performed by me, my personal review of imaging, EKGs, labs and procedures, and our joint assessment and plan.     Total time spent 60 minutes, of which >50% was spent in face-to-face patient evaluation, reviewing data with patient, prolonged counseling of lifestyle modifications, any necessary genetic testing and screening of family  members, and coordination of care.       Aretha Arzate MD MSc    Division of Cardiology  Vascular Section  [facility]

## 2018-10-02 NOTE — PATIENT INSTRUCTIONS
You were seen today in the Cardiovascular Clinic at the [facility].      Cardiology Providers you saw during your visit:   Dr. Arzate    Diagnosis:    (I71.2) Ascending aortic aneurysm (H)    (Q23.1) Congenital insufficiency of aortic valve    Results:  None today    Recommendations:    1. MRA (on Friday) in 6 months  2. Echocardiogram q1-2 years   3. Carotid duplex ordered, schedule at convenience    Follow-up:  6 months with Dr. Arzate      For emergencies call [phone].    For any scheduling needs, please call [phone]. Option 1 then option 3    Thank you for your visit today!     Please call if you have any questions or concerns.  Richard Evangelista RN

## 2018-10-02 NOTE — LETTER
10/2/2018      RE: Robson Gutierrez  [address]  [place] [address]       Dear Colleague,    Thank you for the opportunity to participate in the care of your patient, Robson Gutierrez, at the [facility]. Please see a copy of my visit note below.    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 Patient has no cardiopulmonary symptoms at this time. Of note, his mother has an aneurysm of her basilar artery recently diagnosed. No history of BAV or TAA in the family. He is currently on Atenolol 50mg once a day and SBP range between 120-130.     MRAs performed, serially 4.6 cm, stable.    PAST MEDICAL HISTORY  Past Medical History:   Diagnosis Date     Ascending aortic aneurysm (H) 3/1/2018    4.7 cm on echo         CURRENT MEDICATIONS  Current Outpatient Prescriptions   Medication Sig Dispense Refill     atenolol (TENORMIN) 50 MG tablet Take 50 mg by mouth       clobetasol (TEMOVATE) 0.05 % cream Apply 1 Application topically 2 times daily         PAST SURGICAL HISTORY:  No past surgical history on file.    ALLERGIES   No Known Allergies    FAMILY HISTORY  No family history on file.    VASCULAR FAMILY HISTORY  1st order relative with atherosclerotic PAD: no  1st order relative with AAA: no    SOCIAL HISTORY  Social History     Social History     Marital status:      Spouse name: N/A     Number of children: N/A     Years of education: N/A     Occupational History     Not on file.     Social History Main Topics     Smoking status: Never Smoker     Smokeless tobacco: Never Used     Alcohol use Yes      Comment: ocassional      Drug use: No     Sexual activity: Not on file     Other Topics Concern     Not on file     Social History Narrative     EXAM:  /87 (BP Location: Left arm, Patient Position: Chair, Cuff Size: Adult Regular)  Pulse 64  Ht 1.829 m (6')  Wt 78.7 kg  (173 lb 9.6 oz)  SpO2 97%  BMI 23.54 kg/m2  General: No acute distress   HEENT: NC/AT. No Carotid Bruits.   CV: RRR, +S1/S2, No murmurs, rubs, gallops.   Pulm: Unlabored breathing, CTAB   GI: s/nt/nd   Ext: No edema   Neuro: No focal defects   Psych: Normal Affect    Labs:          CBC RESULTS:  Lab Results   Component Value Date    WBC 5.1 04/19/2018    RBC 4.74 04/19/2018    HGB 14.7 04/19/2018    HCT 42.5 04/19/2018    MCV 90 04/19/2018    MCH 31.0 04/19/2018    MCHC 34.6 04/19/2018    RDW 12.0 04/19/2018     04/19/2018       BMP RESULTS:  Lab Results   Component Value Date     04/19/2018    POTASSIUM 4.4 04/19/2018    CHLORIDE 107 04/19/2018    CO2 29 04/19/2018    ANIONGAP 6 04/19/2018    GLC 98 04/19/2018    BUN 20 04/19/2018    CR 0.85 04/19/2018    GFRESTIMATED >90 04/19/2018    GFRESTBLACK >90 04/19/2018    HALEY 9.2 04/19/2018        A1C RESULTS:  No results found for: A1C      Procedures:    MRA on 09/2018      Clinical history: 53M with BAV and ascending aortic aneurysm by echo presents for elective outpatient MRA  to evaluate aortic dimension.  Comparison MRA: None     1. Mild dilation of the ascending aorta, 4.4cm in its maximum biorthogonal dimension.     2. The remainder of the aortic root, transverse arch and descending thoracic aorta are normal in size  without an aneurysm or dissection.     2. The aortic arch is left sided. There is normal branching of the arch vessels. There is no coarctation.     3. The main and proximal branch pulmonary arteries are normal in size.      4. The pulmonary venous connections are normal.     CONCLUSIONS: Mild dilation of the ascending aorta measuring 4.4 cm.      ECHOCARDIOGRAM 2/22/2018  Interpretation Summary  The aortic valve is bicuspid. The aorta is dilated and measures 47 mm at the  level of the proximal ascending segment (Z-score +4.25.) The aortic root,  indexed to BSA is 23.41 mm/m2 (normal, men: 15+/-2 mm/m2; normal, women: 16+/-  3  mm/m2.)  Left ventricular size is normal. The Ejection Fraction is estimated at 55-60%.  No regional wall motion abnormalities are seen.  The right ventricle is normal size. Global right ventricular function is  normal.  The inferior vena cava was normal in size with preserved respiratory  variability. Estimated mean right atrial pressure is 3 mmHg.  No pericardial effusion is present.  Previous study not available for comparison.  Consider CT or MRA for definitive sizing of the aorta and referral to  cardiology.  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    #Thoracic Aortic Aneurysm  -Patient's MRA in 03/2018 showed an aortic size of 4.8 cm. Repeat MRA in 09/2018 showed a decrease in size to 4.4 cm. On personal view, patient's aorta is still likely around 4.8 cm in a cross-sectional view. Therefore, I believe the aortic size is stable but has not decreased in size. Confirmed with MRI director size is 4.6 cm, stable.  -Will likely need repeat MRA Chest/Abdomen/Pelvis in 6 months.  -Will consider surgical repair at 5 cm. At this point, it will be valve sparing. Given that he is likely 4.8 cm, will obtain carotid duplex and coronary angiogram after next visit.     #Bicupsid Aortic Valve  -Fusion of RCC and LCC   -No evidence of stenosis of regurgitation on ECHO in 2/2018  -Consider repeat ECHO before surgery to ensure no need for valve replacement surgery.    -Recommend screening of children and 1st degree relatives for BAV by ECHO.     Porter Paul   Cardiology Fellow   Pager: [phone]    ATTENDING ATTESTATION    Patient was seen and evaluated in clinic today with the cardiology fellow. The note has been edited to reflect the history taking performed by me, my personal review of imaging, EKGs, labs and procedures, and our joint assessment  and plan.     Total time spent 60 minutes, of which >50% was spent in face-to-face patient evaluation, reviewing data with patient, prolonged counseling of lifestyle modifications, any necessary genetic testing and screening of family members, and coordination of care.       Aretha Arzate MD MSc    Division of Cardiology  Vascular Section  [facility]

## 2018-10-02 NOTE — NURSING NOTE
Cardiac/Vascular Testing: Patient given instructions regarding MRA chest and carotid duplex. Discussed purpose, preparation, procedure and when to expect results reported back to the patient. Patient demonstrated understanding of this information and agreed to call with further questions or concerns.  Med Reconcile: Reviewed and verified all current medications with the patient. The updated medication list was printed and given to the patient.  Return Appointment: 6 months with Dr. Arzate.  Patient given instructions regarding scheduling next clinic visit. Patient demonstrated understanding of this information and agreed to call with further questions or concerns.  Patient stated he understood all health information given and agreed to call with further questions or concerns.

## 2018-10-02 NOTE — MR AVS SNAPSHOT
After Visit Summary   10/2/2018    Robson Gutierrez    MRN: [id number]           Patient Information     Date Of Birth          1965        Visit Information        Provider Department      10/2/2018 12:00 PM Aretha Arzate MD [facility]        Today's Diagnoses     Carotid stenosis, asymptomatic    -  1    Ascending aortic aneurysm (H)        Congenital insufficiency of aortic valve          Care Instructions    You were seen today in the Cardiovascular Clinic at the [facility].      Cardiology Providers you saw during your visit:   Dr. Arzate    Diagnosis:    (I71.2) Ascending aortic aneurysm (H)    (Q23.1) Congenital insufficiency of aortic valve    Results:  None today    Recommendations:    1. MRA (on Friday) in 6 months  2. Echocardiogram q1-2 years   3. Carotid duplex ordered, schedule at convenience    Follow-up:  6 months with Dr. Arzate      For emergencies call 911.    For any scheduling needs, please call [phone]. Option 1 then option 3    Thank you for your visit today!     Please call if you have any questions or concerns.  Richard Evangelista RN                  Follow-ups after your visit        Additional Services     Follow-Up with Vascular Cardiologist       Carotid duplex at patient convenenience.  MRA in 6 months.  Prior to follow up appt.  Schedule on a Friday.                  Your next 10 appointments already scheduled     Oct 03, 2018 11:00 AM CDT   US CAROTID BILATERAL with [address] [facility]  ([facility])    [address] [place] [id number] [phone]           How do I prepare for my exam? (Food and drink instructions) No Food and Drink Restrictions.  How do I prepare for my exam? (Other instructions) You do not need to do anything special to prepare for your exam.  What should I wear: Wear comfortable clothes.  How long does the exam take: Most ultrasounds take 30 to 60  minutes.  What should I bring: Bring a list of your medicines, including vitamins, minerals and over-the-counter drugs. It is safest to leave personal items at home.  Do I need a :  No  is needed.  What do I need to tell my doctor: Tell your doctor about any allergies you may have.  What should I do after the exam: No restrictions, You may resume normal activities.  What is this test: An ultrasound uses sound waves to make pictures of the body. Sound waves do not cause pain. The only discomfort may be the pressure of the wand against your skin or full bladder.  Who should I call with questions: If you have any questions, please call the Imaging Department where you will have your exam. Directions, parking instructions, and other information is available on our website, [email or web address].            Apr 12, 2019  2:30 PM CDT   MRA CHEST WO & W CONTRAST with [address] [facility] ([facility])    [address] [place] [id number] [phone]           How do I prepare for my exam? (Food and drink instructions) **If you will be receiving sedation or general anesthesia, please see special notes below.**  How do I prepare for my exam? (Other instructions) Take your medicines as usual, unless your doctor tells you not to. You may or may not receive intravenous (IV) contrast for this exam pending the discretion of the Radiologist.  You do not need to do anything special to prepare.  **If you will be receiving sedation or general anesthesia, please see special notes below.**  What should I wear: The MRI machine uses a strong magnet. Please wear clothes without metal (snaps, zippers). A sweatsuit works well, or we may give you a hospital gown. Please remove any body piercings and hair extensions before you arrive. You will also remove watches, jewelry, hairpins, wallets, dentures, partial dental plates and hearing aids. You may wear contact  lenses, and you may be able to wear your rings. We have a safe place to keep your personal items, but it is safer to leave them at home.  How long does the exam take: Most tests take 30 to 60 minutes.  HOWEVER, IF YOUR DOCTOR PRESCRIBES ANESTHESIA please plan on spending four to five hours in the recovery room.  What should I bring:  Bring a list of your current medicines to your exam (including vitamins, minerals and over-the-counter drugs).  Do I need a :  **If you will be receiving sedation or general anesthesia, please see special notes below.**  What should I do after the exam: No Restrictions, You may resume normal activities.  What is this test: MRI (magnetic resonance imaging) uses a strong magnet and radio waves to look inside the body. An MRA (magnetic resonance angiogram) does the same thing, but it lets us look at your blood vessels. A computer turns the radio waves into pictures showing cross sections of the body, much like slices of bread. This helps us see any problems more clearly. You may receive fluid (called  contrast ) before or during your scan. The fluid helps us see the pictures better. We give the fluid through an IV (small needle in your arm).  Who should I call with questions:  Please call the Imaging Department at your exam site with any questions. Directions, parking instructions, and other information is available on our website, [email or web address].  How do I prepare if I m having sedation or anesthesia? **IMPORTANT** THE INSTRUCTIONS BELOW ARE ONLY FOR THOSE PATIENTS WHO HAVE BEEN TOLD THEY WILL RECEIVE SEDATION OR GENERAL ANESTHESIA DURING THEIR MRI PROCEDURE:  IF YOU WILL RECEIVE SEDATION (take medicine to help you relax during your exam): You must get the medicine from your doctor before you arrive. Bring the medicine to the exam. Do not take it at home. Arrive one hour early. Bring someone who can take you home after the test. Your medicine will make you sleepy. After the  exam, you may not drive, take a bus or take a taxi by yourself. No eating 8 hours before your exam. You may have clear liquids up until 4 hours before your exam. (Clear liquids include water, clear tea, black coffee and fruit juice without pulp.)  IF YOU WILL RECEIVE ANESTHESIA (be asleep for your exam): Arrive 1 1/2 hours early. Bring someone who can take you home after the test. You may not drive, take a bus or take a taxi by yourself. No eating 8 hours before your exam. You may have clear liquids up until 4 hours before your exam. (Clear liquids include water, clear tea, black coffee and fruit juice without pulp.)            Apr 16, 2019 12:30 PM CDT   (Arrive by 12:15 PM)   Return Vascular Visit with Aretha Arzate MD   [facility] ([facility])    [address] [facility]  [address] [place] [id number] [phone]              Future tests that were ordered for you today     Open Future Orders        Priority Expected Expires Ordered    US Carotid Bilateral Routine 10/2/2018 12/31/2018 10/2/2018    Follow-Up with Vascular Cardiologist Routine 3/31/2019 10/2/2019 10/2/2018    MRA Chest wo & w Contrast Routine 4/2/2019 10/2/2019 10/2/2018            Who to contact     If you have questions or need follow up information about today's clinic visit or your schedule please contact [facility] directly at [phone].  Normal or non-critical lab and imaging results will be communicated to you by MyChart, letter or phone within 4 business days after the clinic has received the results. If you do not hear from us within 7 days, please contact the clinic through [employer] or phone. If you have a critical or abnormal lab result, we will notify you by phone as soon as possible.  Submit refill requests through [employer] or call your pharmacy and they will forward the refill request to us. Please allow 3 business days for your refill to be completed.          Additional  Information About Your Visit        Hotlist[employer] Information     [facility] gives you secure access to your electronic health record. If you see a primary care provider, you can also send messages to your care team and make appointments. If you have questions, please call your primary care clinic.  If you do not have a primary care provider, please call [phone] and they will assist you.        Care EveryWhere ID     This is your Care EveryWhere ID. This could be used by other organizations to access your [facility] medical records  [id number]        Your Vitals Were     Pulse Height Pulse Oximetry BMI (Body Mass Index)          64 1.829 m (6') 97% 23.54 kg/m2         Blood Pressure from Last 3 Encounters:   10/02/18 128/87   04/19/18 114/80   03/20/18 136/90    Weight from Last 3 Encounters:   10/02/18 78.7 kg (173 lb 9.6 oz)   04/19/18 80 kg (176 lb 6.4 oz)   03/20/18 80.3 kg (177 lb)              We Performed the Following     Follow-Up with Vascular Cardiologist        Primary Care Provider Fax #    Physician No Ref-Primary [phone]       No address on file        Equal Access to Services     [facility]: Hadii edgardo Lanza, waaxda luanna, qaybta kaalmada jj, ciarra nugent . So [facility] [phone].    ATENCIÓN: Si habla español, tiene a campos disposición servicios gratuitos de asistencia lingüística. Llame al [phone].    We comply with applicable federal civil rights laws and [place] laws. We do not discriminate on the basis of race, color, national origin, age, disability, sex, sexual orientation, or gender identity.            Thank you!     Thank you for choosing [facility]  for your care. Our goal is always to provide you with excellent care. Hearing back from our patients is one way we can continue to improve our services. Please take a few minutes to complete the written survey that you may receive in the mail after your visit with us. Thank  you!             Your Updated Medication List - Protect others around you: Learn how to safely use, store and throw away your medicines at www.disposemymeds.org.          This list is accurate as of 10/2/18  1:17 PM.  Always use your most recent med list.                   Brand Name Dispense Instructions for use Diagnosis    atenolol 50 MG tablet    TENORMIN     Take 50 mg by mouth        clobetasol 0.05 % cream    TEMOVATE     Apply 1 Application topically 2 times daily

## 2018-10-03 ENCOUNTER — RADIANT APPOINTMENT (OUTPATIENT)
Dept: ULTRASOUND IMAGING | Facility: CLINIC | Age: 53
End: 2018-10-03
Attending: INTERNAL MEDICINE
Payer: COMMERCIAL

## 2018-10-03 DIAGNOSIS — I71.21 ASCENDING AORTIC ANEURYSM (H): ICD-10-CM

## 2018-10-03 DIAGNOSIS — I65.29 CAROTID STENOSIS, ASYMPTOMATIC: ICD-10-CM

## 2019-02-21 ENCOUNTER — DOCUMENTATION ONLY (OUTPATIENT)
Dept: CARE COORDINATION | Facility: CLINIC | Age: 54
End: 2019-02-21

## 2019-04-10 ENCOUNTER — TRANSFERRED RECORDS (OUTPATIENT)
Dept: HEALTH INFORMATION MANAGEMENT | Facility: CLINIC | Age: 54
End: 2019-04-10

## 2019-04-13 ENCOUNTER — MEDICAL CORRESPONDENCE (OUTPATIENT)
Dept: HEALTH INFORMATION MANAGEMENT | Facility: CLINIC | Age: 54
End: 2019-04-13

## 2019-04-13 ENCOUNTER — TRANSFERRED RECORDS (OUTPATIENT)
Dept: HEALTH INFORMATION MANAGEMENT | Facility: CLINIC | Age: 54
End: 2019-04-13

## 2019-04-18 ENCOUNTER — PRE VISIT (OUTPATIENT)
Dept: UROLOGY | Facility: CLINIC | Age: 54
End: 2019-04-18

## 2019-04-18 NOTE — TELEPHONE ENCOUNTER
MEDICAL RECORDS REQUEST   [facility]  Urology Clinic  [address] [place] [address]  PHONE: [phone]  Fax: [phone]        FUTURE VISIT INFORMATION                                                   Robson Gutierrez, : 1965 scheduled for future visit at [facility]    APPOINTMENT INFORMATION:    Date: 2019    Provider:  CLARKE MOYER    Reason for Visit/Diagnosis: ELEVATED PSA    REFERRAL INFORMATION:    Referring provider:  MARIAMA BARR    Specialty: CLINIC    Referring providers clinic:  [facility]    [facility] contact number:  [phone]    RECORDS REQUESTED FOR VISIT                                                     NOTES  STATUS/DETAILS   OFFICE NOTE from referring provider  yes   OFFICE NOTE from other specialist  no   DISCHARGE SUMMARY from hospital  no   DISCHARGE REPORT from the ER  no   OPERATIVE REPORT  no   MEDICATION LIST  yes       PRE-VISIT CHECKLIST      Record collection complete Yes   Appointment appropriately scheduled           (right time/right provider) Yes   MyChart activation Yes   Questionnaire complete If no, please explain IN RPOCESS     Completed by: Alejandra Gaming

## 2019-04-22 ENCOUNTER — HOSPITAL ENCOUNTER (OUTPATIENT)
Dept: MRI IMAGING | Facility: CLINIC | Age: 54
Discharge: HOME | End: 2019-04-22
Attending: INTERNAL MEDICINE | Admitting: INTERNAL MEDICINE
Payer: COMMERCIAL

## 2019-04-22 DIAGNOSIS — I71.21 ASCENDING AORTIC ANEURYSM (H): ICD-10-CM

## 2019-04-22 DIAGNOSIS — Q23.1 CONGENITAL INSUFFICIENCY OF AORTIC VALVE: ICD-10-CM

## 2019-04-22 PROCEDURE — 25500064 ZZH RX 255 OP 636: Performed by: INTERNAL MEDICINE

## 2019-04-22 PROCEDURE — A9585 GADOBUTROL INJECTION: HCPCS | Performed by: INTERNAL MEDICINE

## 2019-04-22 PROCEDURE — 71555 MRI ANGIO CHEST W OR W/O DYE: CPT

## 2019-04-22 RX ORDER — GADOBUTROL 604.72 MG/ML
10 INJECTION INTRAVENOUS ONCE
Status: COMPLETED | OUTPATIENT
Start: 2019-04-22 | End: 2019-04-22

## 2019-04-22 RX ADMIN — GADOBUTROL 10 ML: 604.72 INJECTION INTRAVENOUS at 09:20

## 2019-04-30 ENCOUNTER — OFFICE VISIT (OUTPATIENT)
Dept: CARDIOLOGY | Facility: CLINIC | Age: 54
End: 2019-04-30
Attending: INTERNAL MEDICINE
Payer: COMMERCIAL

## 2019-04-30 VITALS
HEART RATE: 74 BPM | HEIGHT: 72 IN | OXYGEN SATURATION: 100 % | DIASTOLIC BLOOD PRESSURE: 86 MMHG | WEIGHT: 174.3 LBS | BODY MASS INDEX: 23.61 KG/M2 | SYSTOLIC BLOOD PRESSURE: 136 MMHG

## 2019-04-30 DIAGNOSIS — I71.21 ASCENDING AORTIC ANEURYSM (H): ICD-10-CM

## 2019-04-30 DIAGNOSIS — Q23.1 CONGENITAL INSUFFICIENCY OF AORTIC VALVE: ICD-10-CM

## 2019-04-30 PROCEDURE — G0463 HOSPITAL OUTPT CLINIC VISIT: HCPCS | Mod: ZF

## 2019-04-30 PROCEDURE — 99213 OFFICE O/P EST LOW 20 MIN: CPT | Mod: GC | Performed by: INTERNAL MEDICINE

## 2019-04-30 RX ORDER — ATORVASTATIN CALCIUM 20 MG/1
20 TABLET, FILM COATED ORAL DAILY
Qty: 90 TABLET | Refills: 3 | Status: SHIPPED | OUTPATIENT
Start: 2019-04-30 | End: 2023-10-24

## 2019-04-30 ASSESSMENT — MIFFLIN-ST. JEOR: SCORE: 1663.62

## 2019-04-30 ASSESSMENT — PAIN SCALES - GENERAL: PAINLEVEL: NO PAIN (0)

## 2019-04-30 NOTE — NURSING NOTE
Chief Complaint   Patient presents with     Follow Up     52 y/o for 6 month follow up ascending aortic aneurysm     Vitals were taken and medications were reconciled.     Zena Burns MA    11:56 AM

## 2019-04-30 NOTE — PROGRESS NOTES
Vascular Cardiology Consultation      HPI:     52 year old male with a PMHx of HTN, Bicupsid Aortic Valve with TAA (4.8 cm on previous MRAs) who is here for follow up. Patient has no cardiopulmonary symptoms at this time. Of note, his mother has an aneurysm of her basilar artery diagnosed. No history of BAV or TAA in the family. He is currently on Atenolol 50mg once a day and SBP range between 130-140s.    Reviewed MRA from today showing TAA is 4.8 cm and is stable.     PAST MEDICAL HISTORY  Past Medical History:   Diagnosis Date     Ascending aortic aneurysm (H) 3/1/2018    4.7 cm on echo         CURRENT MEDICATIONS  Current Outpatient Medications   Medication Sig Dispense Refill     atenolol (TENORMIN) 50 MG tablet Take 50 mg by mouth       clobetasol (TEMOVATE) 0.05 % cream Apply 1 Application topically 2 times daily         PAST SURGICAL HISTORY:  No past surgical history on file.    ALLERGIES   No Known Allergies    FAMILY HISTORY  No family history on file.    VASCULAR FAMILY HISTORY  1st order relative with atherosclerotic PAD: no  1st order relative with AAA: no    SOCIAL HISTORY  Social History     Socioeconomic History     Marital status:      Spouse name: Not on file     Number of children: Not on file     Years of education: Not on file     Highest education level: Not on file   Occupational History     Not on file   Social Needs     Financial resource strain: Not on file     Food insecurity:     Worry: Not on file     Inability: Not on file     Transportation needs:     Medical: Not on file     Non-medical: Not on file   Tobacco Use     Smoking status: Never Smoker     Smokeless tobacco: Never Used   Substance and Sexual Activity     Alcohol use: Yes     Comment: ocassional      Drug use: No     Sexual activity: Not on file   Lifestyle     Physical activity:     Days per week: Not on file     Minutes per session: Not on file     Stress: Not on file   Relationships     Social connections:      Talks on phone: Not on file     Gets together: Not on file     Attends Confucianism service: Not on file     Active member of club or organization: Not on file     Attends meetings of clubs or organizations: Not on file     Relationship status: Not on file     Intimate partner violence:     Fear of current or ex partner: Not on file     Emotionally abused: Not on file     Physically abused: Not on file     Forced sexual activity: Not on file   Other Topics Concern     Parent/sibling w/ CABG, MI or angioplasty before 65F 55M? Not Asked   Social History Narrative     Not on file       ROS:   Negative unless stated in HPI     EXAM:  /86 (BP Location: Right arm, Patient Position: Chair, Cuff Size: Adult Regular)   Pulse 74   Ht 1.829 m (6')   Wt 79.1 kg (174 lb 4.8 oz)   SpO2 100%   BMI 23.64 kg/m    General: No acute distress   HEENT: NC/AT. Mild bruits right side  CV: RRR, +S1/S2, No murmurs, rubs, gallops.   Pulm: Unlabored breathing, CTAB   GI: s/nt/nd   Ext: No edema   Neuro: No focal defects   Psych: Normal Affect    Labs:          CBC RESULTS:  Lab Results   Component Value Date    WBC 5.1 04/19/2018    RBC 4.74 04/19/2018    HGB 14.7 04/19/2018    HCT 42.5 04/19/2018    MCV 90 04/19/2018    MCH 31.0 04/19/2018    MCHC 34.6 04/19/2018    RDW 12.0 04/19/2018     04/19/2018       BMP RESULTS:  Lab Results   Component Value Date     04/19/2018    POTASSIUM 4.4 04/19/2018    CHLORIDE 107 04/19/2018    CO2 29 04/19/2018    ANIONGAP 6 04/19/2018    GLC 98 04/19/2018    BUN 20 04/19/2018    CR 0.85 04/19/2018    GFRESTIMATED >90 04/19/2018    GFRESTBLACK >90 04/19/2018    HALEY 9.2 04/19/2018        A1C RESULTS:  No results found for: A1C      Procedures:    MRA on 09/2018      Clinical history: 53M with BAV and ascending aortic aneurysm by echo presents for elective outpatient MRA  to evaluate aortic dimension.  Comparison MRA: None     1. Mild dilation of the ascending aorta, 4.4cm in its  maximum biorthogonal dimension.     2. The remainder of the aortic root, transverse arch and descending thoracic aorta are normal in size  without an aneurysm or dissection.     2. The aortic arch is left sided. There is normal branching of the arch vessels. There is no coarctation.     3. The main and proximal branch pulmonary arteries are normal in size.      4. The pulmonary venous connections are normal.     CONCLUSIONS: Mild dilation of the ascending aorta measuring 4.4 cm.    MRA 4/2019     1. The ascending aorta is dilated with maximal diameter of 4.8 cm. The aortic root, transverse  arch and descending thoracic aorta are normal in size without aneurysm or dissection.     2. The aortic arch is left sided. There is normal branching of the arch vessels. There is no coarctation.     3. The main and proximal branch pulmonary arteries are normal in size.      4. The systemic venous connections are normal.      5. There is pectus excavatum.          ECHOCARDIOGRAM 2/22/2018  Interpretation Summary  The aortic valve is bicuspid. The aorta is dilated and measures 47 mm at the  level of the proximal ascending segment (Z-score +4.25.) The aortic root,  indexed to BSA is 23.41 mm/m2 (normal, men: 15+/-2 mm/m2; normal, women: 16+/-  3 mm/m2.)  Left ventricular size is normal. The Ejection Fraction is estimated at 55-60%.  No regional wall motion abnormalities are seen.  The right ventricle is normal size. Global right ventricular function is  normal.  The inferior vena cava was normal in size with preserved respiratory  variability. Estimated mean right atrial pressure is 3 mmHg.  No pericardial effusion is present.  Previous study not available for comparison.  Consider CT or MRA for definitive sizing of the aorta and referral to  cardiology.  ____________________________________________________________________________      Assessment and Plan:     52 year old male with a PMHx of HTN, Bicupsid Aortic Valve with TAA (4.8  cm on MRA) who is here for follow up.    #Thoracic Aortic Aneurysm  - Patient's MRA in 03/2018 showed an aortic size of 4.8 cm. Repeat MRA today is similar at 4.8 cm.  - OK to repeat MRA Chest/Abdomen/Pelvis in 12 months.  - Will consider surgical repair at 5 cm. At this point, it will be valve sparing  - Elective coronary angiogram before next clinic visit.   - Blood pressures mildly elevated. If remains high during his week of check (he will MY chart this), then we would consider adding losartan.    #Bicupsid Aortic Valve  -Fusion of RCC and LCC   -No evidence of stenosis of regurgitation on ECHO in 2/2018  -Consider repeat ECHO before surgery to ensure no need for valve replacement surgery.    -Recommend screening of children and 1st degree relatives for BAV by ECHO.     #Elevated LDL and carotid plaque < 50% on L side  - Starting lipitor 20 mg per day. Liver function in 6 weeks.  - Lipid panel in 6 months    Follow up in one year. Discussed with Dr Arzate.    Fabio Conley MD  Cardiology Fellow      ATTENDING ATTESTATION    Patient was seen and evaluated in clinic today with the cardiology fellow. The note has been edited to reflect the history taking performed by me, my personal review of imaging, EKGs, labs and procedures, and our joint assessment and plan.     Total time spent 45 minutes, of which >50% was spent in face-to-face patient evaluation, reviewing data with patient, prolonged counseling of lifestyle modifications, any necessary genetic testing and screening of family members, and coordination of care.       Aretha Arzate MD MSc   Aortopathy Clinic  Vascular Genetics, Adult Congenital Section  Division of Cardiology  [facility]

## 2019-04-30 NOTE — NURSING NOTE
Home Blood Pressure Monitoring: Patient was instructed regarding the indication, function, timing, goals and documentation of home blood pressure monitoring.  Patient demonstrated understanding of this information and agreed to call with further questions or concerns.  Cardiac/Vascular Testing: Patient given instructions regarding MRA chest prior to follow up. Discussed purpose, preparation, procedure and when to expect results reported back to the patient. Patient demonstrated understanding of this information and agreed to call with further questions or concerns.  Labs:  Patient was instructed to return for the next laboratory testing in 6 weeks and 6 months. Patient demonstrated understanding of this information and agreed to call with further questions or concerns.   Med Reconcile: Reviewed and verified all current medications with the patient. The updated medication list was printed and given to the patient.  New Medication:  Atorvastatin 20 mg po every day.   Patient was educated regarding newly prescribed medication, including discussion of  the indication, administration, side effects, and when to report to MD or RN. Patient demonstrated understanding of this information and agreed to call with further questions or concerns.  Return Appointment: 1 year with Dr. Arzate.  Patient given instructions regarding scheduling next clinic visit. Patient demonstrated understanding of this information and agreed to call with further questions or concerns.  Patient stated he understood all health information given and agreed to call with further questions or concerns.

## 2019-04-30 NOTE — LETTER
4/30/2019      RE: Robson Gutierrez  [address]  [facility] [address]       Dear Colleague,    Thank you for the opportunity to participate in the care of your patient, Robson Gutierrez, at the [facility]. Please see a copy of my visit note below.         Vascular Cardiology Consultation      HPI:     52 year old male with a PMHx of HTN, Bicupsid Aortic Valve with TAA (4.8 cm on previous MRAs) who is here for follow up. Patient has no cardiopulmonary symptoms at this time. Of note, his mother has an aneurysm of her basilar artery diagnosed. No history of BAV or TAA in the family. He is currently on Atenolol 50mg once a day and SBP range between 130-140s.    Reviewed MRA from today showing TAA is 4.8 cm and is stable.     PAST MEDICAL HISTORY  Past Medical History:   Diagnosis Date     Ascending aortic aneurysm (H) 3/1/2018    4.7 cm on echo         CURRENT MEDICATIONS  Current Outpatient Medications   Medication Sig Dispense Refill     atenolol (TENORMIN) 50 MG tablet Take 50 mg by mouth       clobetasol (TEMOVATE) 0.05 % cream Apply 1 Application topically 2 times daily         PAST SURGICAL HISTORY:  No past surgical history on file.    ALLERGIES   No Known Allergies    FAMILY HISTORY  No family history on file.    VASCULAR FAMILY HISTORY  1st order relative with atherosclerotic PAD: no  1st order relative with AAA: no    SOCIAL HISTORY  Social History     Socioeconomic History     Marital status:      Spouse name: Not on file     Number of children: Not on file     Years of education: Not on file     Highest education level: Not on file   Occupational History     Not on file   Social Needs     Financial resource strain: Not on file     Food insecurity:     Worry: Not on file     Inability: Not on file     Transportation needs:     Medical: Not on file     Non-medical: Not on file   Tobacco Use     Smoking status: Never Smoker     Smokeless  tobacco: Never Used   Substance and Sexual Activity     Alcohol use: Yes     Comment: ocassional      Drug use: No     Sexual activity: Not on file   Lifestyle     Physical activity:     Days per week: Not on file     Minutes per session: Not on file     Stress: Not on file   Relationships     Social connections:     Talks on phone: Not on file     Gets together: Not on file     Attends Sikhism service: Not on file     Active member of club or organization: Not on file     Attends meetings of clubs or organizations: Not on file     Relationship status: Not on file     Intimate partner violence:     Fear of current or ex partner: Not on file     Emotionally abused: Not on file     Physically abused: Not on file     Forced sexual activity: Not on file   Other Topics Concern     Parent/sibling w/ CABG, MI or angioplasty before 65F 55M? Not Asked   Social History Narrative     Not on file       ROS:   Negative unless stated in HPI     EXAM:  /86 (BP Location: Right arm, Patient Position: Chair, Cuff Size: Adult Regular)   Pulse 74   Ht 1.829 m (6')   Wt 79.1 kg (174 lb 4.8 oz)   SpO2 100%   BMI 23.64 kg/m     General: No acute distress   HEENT: NC/AT. Mild bruits right side  CV: RRR, +S1/S2, No murmurs, rubs, gallops.   Pulm: Unlabored breathing, CTAB   GI: s/nt/nd   Ext: No edema   Neuro: No focal defects   Psych: Normal Affect    Labs:          CBC RESULTS:  Lab Results   Component Value Date    WBC 5.1 04/19/2018    RBC 4.74 04/19/2018    HGB 14.7 04/19/2018    HCT 42.5 04/19/2018    MCV 90 04/19/2018    MCH 31.0 04/19/2018    MCHC 34.6 04/19/2018    RDW 12.0 04/19/2018     04/19/2018       BMP RESULTS:  Lab Results   Component Value Date     04/19/2018    POTASSIUM 4.4 04/19/2018    CHLORIDE 107 04/19/2018    CO2 29 04/19/2018    ANIONGAP 6 04/19/2018    GLC 98 04/19/2018    BUN 20 04/19/2018    CR 0.85 04/19/2018    GFRESTIMATED >90 04/19/2018    GFRESTBLACK >90 04/19/2018    HALEY 9.2  04/19/2018        A1C RESULTS:  No results found for: A1C      Procedures:    MRA on 09/2018      Clinical history: 53M with BAV and ascending aortic aneurysm by echo presents for elective outpatient MRA  to evaluate aortic dimension.  Comparison MRA: None     1. Mild dilation of the ascending aorta, 4.4cm in its maximum biorthogonal dimension.     2. The remainder of the aortic root, transverse arch and descending thoracic aorta are normal in size  without an aneurysm or dissection.     2. The aortic arch is left sided. There is normal branching of the arch vessels. There is no coarctation.     3. The main and proximal branch pulmonary arteries are normal in size.      4. The pulmonary venous connections are normal.     CONCLUSIONS: Mild dilation of the ascending aorta measuring 4.4 cm.    MRA 4/2019     1. The ascending aorta is dilated with maximal diameter of 4.8 cm. The aortic root, transverse  arch and descending thoracic aorta are normal in size without aneurysm or dissection.     2. The aortic arch is left sided. There is normal branching of the arch vessels. There is no coarctation.     3. The main and proximal branch pulmonary arteries are normal in size.      4. The systemic venous connections are normal.      5. There is pectus excavatum.          ECHOCARDIOGRAM 2/22/2018  Interpretation Summary  The aortic valve is bicuspid. The aorta is dilated and measures 47 mm at the  level of the proximal ascending segment (Z-score +4.25.) The aortic root,  indexed to BSA is 23.41 mm/m2 (normal, men: 15+/-2 mm/m2; normal, women: 16+/-  3 mm/m2.)  Left ventricular size is normal. The Ejection Fraction is estimated at 55-60%.  No regional wall motion abnormalities are seen.  The right ventricle is normal size. Global right ventricular function is  normal.  The inferior vena cava was normal in size with preserved respiratory  variability. Estimated mean right atrial pressure is 3 mmHg.  No pericardial effusion is  present.  Previous study not available for comparison.  Consider CT or MRA for definitive sizing of the aorta and referral to  cardiology.  ____________________________________________________________________________      Assessment and Plan:     52 year old male with a PMHx of HTN, Bicupsid Aortic Valve with TAA (4.8 cm on MRA) who is here for follow up.    #Thoracic Aortic Aneurysm  - Patient's MRA in 03/2018 showed an aortic size of 4.8 cm. Repeat MRA today is similar at 4.8 cm.  - OK to repeat MRA Chest/Abdomen/Pelvis in 12 months.  - Will consider surgical repair at 5 cm. At this point, it will be valve sparing  - Elective coronary angiogram before next clinic visit.   - Blood pressures mildly elevated. If remains high during his week of check (he will MY chart this), then we would consider adding losartan.    #Bicupsid Aortic Valve  -Fusion of RCC and LCC   -No evidence of stenosis of regurgitation on ECHO in 2/2018  -Consider repeat ECHO before surgery to ensure no need for valve replacement surgery.    -Recommend screening of children and 1st degree relatives for BAV by ECHO.     #Elevated LDL and carotid plaque < 50% on L side  - Starting lipitor 20 mg per day. Liver function in 6 weeks.  - Lipid panel in 6 months    Follow up in one year. Discussed with Dr Arzate.    Fabio Conley MD  Cardiology Fellow      ATTENDING ATTESTATION    Patient was seen and evaluated in clinic today with the cardiology fellow. The note has been edited to reflect the history taking performed by me, my personal review of imaging, EKGs, labs and procedures, and our joint assessment and plan.     Total time spent 45 minutes, of which >50% was spent in face-to-face patient evaluation, reviewing data with patient, prolonged counseling of lifestyle modifications, any necessary genetic testing and screening of family members, and coordination of care.       Aretha Arzate MD MSc   Aortopathy Clinic  Vascular  Genetics, Adult Congenital Section  Division of Cardiology  [facility]

## 2019-04-30 NOTE — PATIENT INSTRUCTIONS
You were seen today in the Cardiovascular Clinic at the [facility].      Cardiology Providers you saw during your visit:   Dr. Arzate    Diagnosis:    Ascending aortic aneurysm    Congenital insufficiency of aortic valve    Results:  MRA reviewed    Recommendations:     -atorvastatin 20 mg once per day  -liver function testing 6 weeks  -lipid panel 6 months (fasting).  Nothing to eat for 6 hours prior.  -MRA chest in 1 yr with follow up   -BP recordings for one week and if >120/80, can add losartan if needed.  Send via [employer] in 2 weeks.    Follow-up:  1 year with Dr. Arzate      For emergencies call 220.    For any scheduling needs, please call [phone].     Thank you for your visit today!     Please call if you have any questions or concerns.  Richard Evangelista RN

## 2019-05-03 ENCOUNTER — HOSPITAL ENCOUNTER (EMERGENCY)
Facility: CLINIC | Age: 54
Discharge: HOME | End: 2019-05-03
Attending: INTERNAL MEDICINE | Admitting: INTERNAL MEDICINE
Payer: COMMERCIAL

## 2019-05-03 VITALS
HEART RATE: 63 BPM | OXYGEN SATURATION: 100 % | RESPIRATION RATE: 16 BRPM | TEMPERATURE: 97.8 F | SYSTOLIC BLOOD PRESSURE: 116 MMHG | DIASTOLIC BLOOD PRESSURE: 90 MMHG

## 2019-05-03 DIAGNOSIS — I45.10 RIGHT BUNDLE BRANCH BLOCK: ICD-10-CM

## 2019-05-03 DIAGNOSIS — R55 VASOVAGAL SYNCOPE: ICD-10-CM

## 2019-05-03 LAB
ALBUMIN SERPL-MCNC: 3.8 G/DL (ref 3.4–5)
ALP SERPL-CCNC: 81 U/L (ref 40–150)
ALT SERPL W P-5'-P-CCNC: 30 U/L (ref 0–70)
ANION GAP SERPL CALCULATED.3IONS-SCNC: 7 MMOL/L (ref 3–14)
AST SERPL W P-5'-P-CCNC: 13 U/L (ref 0–45)
BASOPHILS # BLD AUTO: 0 10E9/L (ref 0–0.2)
BASOPHILS NFR BLD AUTO: 0.5 %
BILIRUB SERPL-MCNC: 0.6 MG/DL (ref 0.2–1.3)
BUN SERPL-MCNC: 22 MG/DL (ref 7–30)
CALCIUM SERPL-MCNC: 9.3 MG/DL (ref 8.5–10.1)
CHLORIDE SERPL-SCNC: 106 MMOL/L (ref 94–109)
CO2 SERPL-SCNC: 29 MMOL/L (ref 20–32)
CREAT SERPL-MCNC: 0.91 MG/DL (ref 0.66–1.25)
DIFFERENTIAL METHOD BLD: NORMAL
EOSINOPHIL # BLD AUTO: 0.3 10E9/L (ref 0–0.7)
EOSINOPHIL NFR BLD AUTO: 3.8 %
ERYTHROCYTE [DISTWIDTH] IN BLOOD BY AUTOMATED COUNT: 12.3 % (ref 10–15)
GFR SERPL CREATININE-BSD FRML MDRD: >90 ML/MIN/{1.73_M2}
GLUCOSE SERPL-MCNC: 150 MG/DL (ref 70–99)
HCT VFR BLD AUTO: 45.1 % (ref 40–53)
HGB BLD-MCNC: 14.2 G/DL (ref 13.3–17.7)
IMM GRANULOCYTES # BLD: 0 10E9/L (ref 0–0.4)
IMM GRANULOCYTES NFR BLD: 0.3 %
INTERPRETATION ECG - MUSE: NORMAL
LYMPHOCYTES # BLD AUTO: 2.1 10E9/L (ref 0.8–5.3)
LYMPHOCYTES NFR BLD AUTO: 26.7 %
MCH RBC QN AUTO: 29.3 PG (ref 26.5–33)
MCHC RBC AUTO-ENTMCNC: 31.5 G/DL (ref 31.5–36.5)
MCV RBC AUTO: 93 FL (ref 78–100)
MONOCYTES # BLD AUTO: 0.7 10E9/L (ref 0–1.3)
MONOCYTES NFR BLD AUTO: 8.7 %
NEUTROPHILS # BLD AUTO: 4.8 10E9/L (ref 1.6–8.3)
NEUTROPHILS NFR BLD AUTO: 60 %
NRBC # BLD AUTO: 0 10*3/UL
NRBC BLD AUTO-RTO: 0 /100
PLATELET # BLD AUTO: 224 10E9/L (ref 150–450)
POTASSIUM SERPL-SCNC: 4.3 MMOL/L (ref 3.4–5.3)
PROT SERPL-MCNC: 8 G/DL (ref 6.8–8.8)
RBC # BLD AUTO: 4.84 10E12/L (ref 4.4–5.9)
SODIUM SERPL-SCNC: 142 MMOL/L (ref 133–144)
TROPONIN I BLD-MCNC: 0 UG/L (ref 0–0.08)
WBC # BLD AUTO: 8 10E9/L (ref 4–11)

## 2019-05-03 PROCEDURE — 99284 EMERGENCY DEPT VISIT MOD MDM: CPT | Mod: 25 | Performed by: INTERNAL MEDICINE

## 2019-05-03 PROCEDURE — 93010 ELECTROCARDIOGRAM REPORT: CPT | Mod: Z6 | Performed by: INTERNAL MEDICINE

## 2019-05-03 PROCEDURE — 96360 HYDRATION IV INFUSION INIT: CPT | Performed by: INTERNAL MEDICINE

## 2019-05-03 PROCEDURE — 85025 COMPLETE CBC W/AUTO DIFF WBC: CPT | Performed by: INTERNAL MEDICINE

## 2019-05-03 PROCEDURE — 25000128 H RX IP 250 OP 636: Performed by: INTERNAL MEDICINE

## 2019-05-03 PROCEDURE — 80053 COMPREHEN METABOLIC PANEL: CPT | Performed by: INTERNAL MEDICINE

## 2019-05-03 PROCEDURE — 84484 ASSAY OF TROPONIN QUANT: CPT

## 2019-05-03 PROCEDURE — 93005 ELECTROCARDIOGRAM TRACING: CPT | Performed by: INTERNAL MEDICINE

## 2019-05-03 PROCEDURE — 96361 HYDRATE IV INFUSION ADD-ON: CPT | Performed by: INTERNAL MEDICINE

## 2019-05-03 RX ADMIN — SODIUM CHLORIDE 1000 ML: 9 INJECTION, SOLUTION INTRAVENOUS at 11:14

## 2019-05-03 ASSESSMENT — ENCOUNTER SYMPTOMS: FEVER: 0

## 2019-05-03 NOTE — ED PROVIDER NOTES
History     Chief Complaint   Patient presents with     Syncope     HPI  Robson Gutierrez is a 53 year old male with a history of ascending aortic aneurysm, HTN who presents to the Emergency Department today for evaluation following syncopal episode. The patient states that he was observing  Kidney biopsy. He states that he started to feel dizzy and stepped out of the room and sat down. The patient states that he then went to stand up and lost consciousness for about 10 seconds per bystanders. The patient did not fall as he was assisted by bystanders. Here, the patient states that he feel completley normal. This has never happened to him before. He notes that this was the first time he has observed a procedure like the one he watched today.     I have reviewed the Medications, Allergies, Past Medical and Surgical History, and Social History in the [facility] [employer] [facility] system.    Past Medical History:   Diagnosis Date     Ascending aortic aneurysm (H) 3/1/2018    4.7 cm on echo       History reviewed. No pertinent surgical history.    No family history on file.    Social History     Tobacco Use     Smoking status: Never Smoker     Smokeless tobacco: Never Used   Substance Use Topics     Alcohol use: Yes     Comment: ocassional        No current facility-administered medications for this encounter.      Current Outpatient Medications   Medication     atenolol (TENORMIN) 50 MG tablet     atorvastatin (LIPITOR) 20 MG tablet     clobetasol (TEMOVATE) 0.05 % cream      No Known Allergies     Review of Systems   Constitutional: Negative for fever.   Cardiovascular: Negative for chest pain.   Neurological: Positive for syncope.   All other systems reviewed and are negative.    Physical Exam   BP: 112/88  Pulse: 65  Heart Rate: 63  Temp: 97.8  F (36.6  C)  Resp: 17  SpO2: 98 %      Physical Exam   Constitutional: No distress.   HENT:   Head: Atraumatic.   Mouth/Throat: Oropharynx is clear and moist.   Eyes: Pupils are equal, round, and  reactive to light. No scleral icterus.   Neck: Neck supple. No JVD present.   Cardiovascular: Normal rate, regular rhythm, normal heart sounds and intact distal pulses. Exam reveals no gallop and no friction rub.   No murmur heard.  Pulmonary/Chest: Effort normal and breath sounds normal. No stridor. No respiratory distress. He has no wheezes. He has no rales. He exhibits no tenderness.   Abdominal: Soft. Bowel sounds are normal. He exhibits no distension and no mass. There is no tenderness. There is no rebound and no guarding. No hernia.   Musculoskeletal: He exhibits no edema or tenderness.   Neurological: He is alert. No cranial nerve deficit.   Skin: Skin is warm. No rash noted. He is not diaphoretic.       ED Course   11:05 AM  The patient was seen and examined by Dr. Gooden in Room 23.       Procedures             EKG Interpretation:      Interpreted by Ty Gooden MD  Time reviewed: 12:07  Symptoms at time of EKG: syncope   Rhythm: normal sinus   Rate: Normal  Axis: Normal  Ectopy: none  Conduction: right bundle branch black  ST Segments/ T Waves: No ST-T wave changes  Q Waves: none  Comparison to prior: New right bundle branch block    Clinical Impression: abnormal EKG      Results for orders placed or performed during the hospital encounter of 05/03/19 (from the past 24 hour(s))   CBC with platelets differential     Status: None    Collection Time: 05/03/19 11:05 AM   Result Value Ref Range    WBC 8.0 4.0 - 11.0 10e9/L    RBC Count 4.84 4.4 - 5.9 10e12/L    Hemoglobin 14.2 13.3 - 17.7 g/dL    Hematocrit 45.1 40.0 - 53.0 %    MCV 93 78 - 100 fl    MCH 29.3 26.5 - 33.0 pg    MCHC 31.5 31.5 - 36.5 g/dL    RDW 12.3 10.0 - 15.0 %    Platelet Count 224 150 - 450 10e9/L    Diff Method Automated Method     % Neutrophils 60.0 %    % Lymphocytes 26.7 %    % Monocytes 8.7 %    % Eosinophils 3.8 %    % Basophils 0.5 %    % Immature Granulocytes 0.3 %    Nucleated RBCs 0 0 /100    Absolute Neutrophil 4.8 1.6 - 8.3  10e9/L    Absolute Lymphocytes 2.1 0.8 - 5.3 10e9/L    Absolute Monocytes 0.7 0.0 - 1.3 10e9/L    Absolute Eosinophils 0.3 0.0 - 0.7 10e9/L    Absolute Basophils 0.0 0.0 - 0.2 10e9/L    Abs Immature Granulocytes 0.0 0 - 0.4 10e9/L    Absolute Nucleated RBC 0.0    Comprehensive metabolic panel     Status: Abnormal    Collection Time: 05/03/19 11:05 AM   Result Value Ref Range    Sodium 142 133 - 144 mmol/L    Potassium 4.3 3.4 - 5.3 mmol/L    Chloride 106 94 - 109 mmol/L    Carbon Dioxide 29 20 - 32 mmol/L    Anion Gap 7 3 - 14 mmol/L    Glucose 150 (H) 70 - 99 mg/dL    Urea Nitrogen 22 7 - 30 mg/dL    Creatinine 0.91 0.66 - 1.25 mg/dL    GFR Estimate >90 >60 mL/min/[1.73_m2]    GFR Estimate If Black >90 >60 mL/min/[1.73_m2]    Calcium 9.3 8.5 - 10.1 mg/dL    Bilirubin Total 0.6 0.2 - 1.3 mg/dL    Albumin 3.8 3.4 - 5.0 g/dL    Protein Total 8.0 6.8 - 8.8 g/dL    Alkaline Phosphatase 81 40 - 150 U/L    ALT 30 0 - 70 U/L    AST 13 0 - 45 U/L   Troponin POCT     Status: None    Collection Time: 05/03/19 11:09 AM   Result Value Ref Range    Troponin I 0.00 0.00 - 0.08 ug/L   EKG 12 lead     Status: None    Collection Time: 05/03/19 11:10 AM   Result Value Ref Range    Interpretation ECG Click View Image link to view waveform and result            Labs Ordered and Resulted from Time of ED Arrival Up to the Time of Departure from the ED   COMPREHENSIVE METABOLIC PANEL - Abnormal; Notable for the following components:       Result Value    Glucose 150 (*)     All other components within normal limits   CBC WITH PLATELETS DIFFERENTIAL   TROPONIN I   CARDIAC CONTINUOUS MONITORING   VITAL SIGNS   TROPONIN POCT            Assessments & Plan (with Medical Decision Making)  Vasovagal episode with watching medical procedure that he has never seen before in the pt with dilated ascending aorta with pectus, EKG shows new RBBB form 3/2018-D/W Dr Zuluaga who felt unless clinical suspicion of RV strain, not significant-this was also  communicated to the pt and discharge to home with follow up with his PMD.       I have reviewed the nursing notes.    I have reviewed the findings, diagnosis, plan and need for follow up with the patient.       Medication List      There are no discharge medications for this visit.         Final diagnoses:   Vasovagal syncope   Right bundle branch block - new from 3/2018   IDenzel, am serving as a trained medical scribe to document services personally performed by Ty Gooden MD, based on the provider's statements to me.   Ty GOLDBERG MD, was physically present and have reviewed and verified the accuracy of this note documented by Denzel Molina.     5/3/2019   [facility], [place], EMERGENCY DEPARTMENT     Ty Gooden MD  05/03/19 0736

## 2019-05-03 NOTE — ED AVS SNAPSHOT
[facility], [place], Emergency Department  [address] [place] [id number]  Phone:  [phone]                                    Robson Gutierrez   MRN: [id number]    Department:  [facility], Emergency Department   Date of Visit:  5/3/2019           After Visit Summary Signature Page    I have received my discharge instructions, and my questions have been answered. I have discussed any challenges I see with this plan with the nurse or doctor.    ..........................................................................................................................................  Patient/Patient Representative Signature      ..........................................................................................................................................  Patient Representative Print Name and Relationship to Patient    ..................................................               ................................................  Date                                   Time    ..........................................................................................................................................  Reviewed by Signature/Title    ...................................................              ..............................................  Date                                               Time          22EPIC Rev 08/18

## 2019-05-06 LAB — INTERPRETATION ECG - MUSE: NORMAL

## 2019-05-07 ENCOUNTER — PATIENT OUTREACH (OUTPATIENT)
Dept: CARDIOLOGY | Facility: CLINIC | Age: 54
End: 2019-05-07

## 2019-05-07 DIAGNOSIS — R55 SYNCOPE: Primary | ICD-10-CM

## 2019-05-10 ENCOUNTER — ALLIED HEALTH/NURSE VISIT (OUTPATIENT)
Dept: CARDIOLOGY | Facility: CLINIC | Age: 54
End: 2019-05-10
Attending: INTERNAL MEDICINE
Payer: COMMERCIAL

## 2019-05-10 DIAGNOSIS — R55 SYNCOPE: ICD-10-CM

## 2019-05-10 PROCEDURE — 0298T ZZC EXT ECG > 48HR TO 21 DAY REVIEW AND INTERPRETATN: CPT | Performed by: INTERNAL MEDICINE

## 2019-05-21 ENCOUNTER — TELEPHONE (OUTPATIENT)
Dept: UROLOGY | Facility: CLINIC | Age: 54
End: 2019-05-21

## 2019-05-21 NOTE — TELEPHONE ENCOUNTER
Chief Complaint : New-Referred by [facility]     Hx/Sx: Elevated PSA, from 4/11 was 2.5    Records/Orders: Available     Pt Contacted: N/A    At Rooming: Normal

## 2019-05-24 ENCOUNTER — OFFICE VISIT (OUTPATIENT)
Dept: UROLOGY | Facility: CLINIC | Age: 54
End: 2019-05-24
Payer: COMMERCIAL

## 2019-05-24 VITALS
SYSTOLIC BLOOD PRESSURE: 110 MMHG | HEART RATE: 68 BPM | HEIGHT: 72 IN | BODY MASS INDEX: 23.03 KG/M2 | DIASTOLIC BLOOD PRESSURE: 89 MMHG | WEIGHT: 170 LBS

## 2019-05-24 DIAGNOSIS — R97.20 ELEVATED PROSTATE SPECIFIC ANTIGEN (PSA): ICD-10-CM

## 2019-05-24 DIAGNOSIS — I71.21 ASCENDING AORTIC ANEURYSM (H): ICD-10-CM

## 2019-05-24 DIAGNOSIS — R97.20 ELEVATED PROSTATE SPECIFIC ANTIGEN (PSA): Primary | ICD-10-CM

## 2019-05-24 DIAGNOSIS — Q23.1 CONGENITAL INSUFFICIENCY OF AORTIC VALVE: ICD-10-CM

## 2019-05-24 LAB
ALBUMIN SERPL-MCNC: 4 G/DL (ref 3.4–5)
ALP SERPL-CCNC: 87 U/L (ref 40–150)
ALT SERPL W P-5'-P-CCNC: 39 U/L (ref 0–70)
ANION GAP SERPL CALCULATED.3IONS-SCNC: 5 MMOL/L (ref 3–14)
AST SERPL W P-5'-P-CCNC: 17 U/L (ref 0–45)
BILIRUB SERPL-MCNC: 0.6 MG/DL (ref 0.2–1.3)
BUN SERPL-MCNC: 20 MG/DL (ref 7–30)
CALCIUM SERPL-MCNC: 9.5 MG/DL (ref 8.5–10.1)
CHLORIDE SERPL-SCNC: 105 MMOL/L (ref 94–109)
CO2 SERPL-SCNC: 28 MMOL/L (ref 20–32)
CREAT SERPL-MCNC: 0.88 MG/DL (ref 0.66–1.25)
GFR SERPL CREATININE-BSD FRML MDRD: >90 ML/MIN/{1.73_M2}
GLUCOSE SERPL-MCNC: 91 MG/DL (ref 70–99)
POTASSIUM SERPL-SCNC: 4.4 MMOL/L (ref 3.4–5.3)
PROT SERPL-MCNC: 8 G/DL (ref 6.8–8.8)
PSA SERPL-MCNC: 1.59 UG/L (ref 0–4)
SODIUM SERPL-SCNC: 138 MMOL/L (ref 133–144)

## 2019-05-24 RX ORDER — ATENOLOL 50 MG/1
50 TABLET ORAL
COMMUNITY
Start: 2017-09-19 | End: 2019-11-01 | Stop reason: ALTCHOICE

## 2019-05-24 RX ORDER — CLOBETASOL PROPIONATE 0.5 MG/G
CREAM TOPICAL
COMMUNITY
Start: 2015-11-03 | End: 2019-11-01

## 2019-05-24 ASSESSMENT — PAIN SCALES - GENERAL: PAINLEVEL: NO PAIN (0)

## 2019-05-24 ASSESSMENT — MIFFLIN-ST. JEOR: SCORE: 1644.11

## 2019-05-24 NOTE — PATIENT INSTRUCTIONS
Please follow up in 6 months with a PSA before       It was a pleasure meeting with you today.  Thank you for allowing me and my team the privilege of caring for you today.  YOU are the reason we are here, and I truly hope we provided you with the excellent service you deserve.  Please let us know if there is anything else we can do for you so that we can be sure you are leaving completely satisfied with your care experience.

## 2019-05-24 NOTE — LETTER
5/24/2019     RE: Robson Gutierrez  [address]  [facility] [address]     Dear Colleague,    Thank you for referring your patient, Robson Gutierrez, to the [facility] UROLOGY AND INST FOR PROSTATE AND UROLOGIC CANCERS at [facility]. Please see a copy of my visit note below.    It was my pleasure to see Robson Gutierrez a 53 year old year old male today. Patient was seen in consultation for elevated PSA.    HPI:     Patient presents for evaluation and management of elevated PSA.  Patient has had two PSAs at [place]    PSA trend:   02/2018 1.6  04/2019 2.5    Repeat PSA today was 1.59    Patient denies lower urinary tract symptoms including frequency, urgency, nocturia.       We discussed PSA trend. Discussed fluctuations and further follow up.       Past Medical History:   Diagnosis Date     Aortic aneurysm (H)      Ascending aortic aneurysm (H) 3/1/2018    4.7 cm on echo       History reviewed. No pertinent surgical history.    History reviewed. No pertinent family history.    Current Outpatient Medications   Medication Sig Dispense Refill     atenolol (TENORMIN) 50 MG tablet Take 50 mg by mouth       atenolol (TENORMIN) 50 MG tablet Take 50 mg by mouth       atorvastatin (LIPITOR) 20 MG tablet Take 1 tablet (20 mg) by mouth daily 90 tablet 3     clobetasol (TEMOVATE) 0.05 % cream Apply 1 Application topically 2 times daily       clobetasol (TEMOVATE) 0.05 % external cream          ALLERGIES: Patient has no known allergies.      REVIEW OF SYSTEMS:  Skin: negative  Eyes: negative  Ears/Nose/Throat: negative  Respiratory: No shortness of breath, dyspnea on exertion, cough, or hemoptysis  Cardiovascular: negative  Gastrointestinal: negative  Genitourinary: as above  Musculoskeletal: negative  Neurologic: negative  Psychiatric: negative  Hematologic/Lymphatic/Immunologic: negative  Endocrine: negative      GENERAL PHYSICAL EXAM:   Vitals: /89   Pulse 68   Ht 1.829 m (6')    Wt 77.1 kg (170 lb)   BMI 23.06 kg/m     Body mass index is 23.06 kg/m .  Constitutional: healthy, alert and no distress  Head: Normocephalic  Neck: Neck supple  Cardiovascular: negative  Respiratory: negative  Gastrointestinal: Abdomen soft, non-tender  Musculoskeletal: extremities normal  Skin: no suspicious lesions or rashes  Neurologic: Gait normal  Psychiatric: affect normal/bright and mentation appears normal.       EXAM:   Left Flank: negative  Right Flank: negative        RECTAL EXAM:   Size: 1+  Sphincter tone: normal  Tenderness: absent  Rectal Mass: absent  Prostate Nodule: absent     RADIOLOGY: The following tests were reviewed: Need to complete the following Radiology exams prior to the office appointment:  LABS: The last test results for Needs to complete the necessary tests prior to appointment: were reviewed.     ASSESSMENT: 52 y/o M with recent rise in PSA but now at baseline     PLAN:   Repeat PSA in six months   Follow up in six months     I spent over 20 minutes with the patient.  Over half this time was spent on counseling for elevated PSA.     Answers for HPI/ROS submitted by the patient on 5/24/2019   General Symptoms: No  Skin Symptoms: No  HENT Symptoms: No  EYE SYMPTOMS: No  HEART SYMPTOMS: No  LUNG SYMPTOMS: No  INTESTINAL SYMPTOMS: No  URINARY SYMPTOMS: No  REPRODUCTIVE SYMPTOMS: No  SKELETAL SYMPTOMS: No  BLOOD SYMPTOMS: No  NERVOUS SYSTEM SYMPTOMS: No  MENTAL HEALTH SYMPTOMS: No    Again, thank you for allowing me to participate in the care of your patient.      Sincerely,    Марина Glover MD

## 2019-05-24 NOTE — PROGRESS NOTES
It was my pleasure to see Robson Gutierrez a 53 year old year old male today. Patient was seen in consultation for elevated PSA.    HPI:     Patient presents for evaluation and management of elevated PSA.  Patient has had two PSAs at [facility]    PSA trend:   02/2018 1.6  04/2019 2.5    Repeat PSA today was 1.59    Patient denies lower urinary tract symptoms including frequency, urgency, nocturia.       We discussed PSA trend. Discussed fluctuations and further follow up.       Past Medical History:   Diagnosis Date     Aortic aneurysm (H)      Ascending aortic aneurysm (H) 3/1/2018    4.7 cm on echo       History reviewed. No pertinent surgical history.    History reviewed. No pertinent family history.    Current Outpatient Medications   Medication Sig Dispense Refill     atenolol (TENORMIN) 50 MG tablet Take 50 mg by mouth       atenolol (TENORMIN) 50 MG tablet Take 50 mg by mouth       atorvastatin (LIPITOR) 20 MG tablet Take 1 tablet (20 mg) by mouth daily 90 tablet 3     clobetasol (TEMOVATE) 0.05 % cream Apply 1 Application topically 2 times daily       clobetasol (TEMOVATE) 0.05 % external cream          ALLERGIES: Patient has no known allergies.      REVIEW OF SYSTEMS:  Skin: negative  Eyes: negative  Ears/Nose/Throat: negative  Respiratory: No shortness of breath, dyspnea on exertion, cough, or hemoptysis  Cardiovascular: negative  Gastrointestinal: negative  Genitourinary: as above  Musculoskeletal: negative  Neurologic: negative  Psychiatric: negative  Hematologic/Lymphatic/Immunologic: negative  Endocrine: negative      GENERAL PHYSICAL EXAM:   Vitals: /89   Pulse 68   Ht 1.829 m (6')   Wt 77.1 kg (170 lb)   BMI 23.06 kg/m    Body mass index is 23.06 kg/m .  Constitutional: healthy, alert and no distress  Head: Normocephalic  Neck: Neck supple  Cardiovascular: negative  Respiratory: negative  Gastrointestinal: Abdomen soft, non-tender  Musculoskeletal: extremities normal  Skin: no suspicious  lesions or rashes  Neurologic: Gait normal  Psychiatric: affect normal/bright and mentation appears normal.       EXAM:   Left Flank: negative  Right Flank: negative        RECTAL EXAM:   Size: 1+  Sphincter tone: normal  Tenderness: absent  Rectal Mass: absent  Prostate Nodule: absent          RADIOLOGY: The following tests were reviewed: Need to complete the following Radiology exams prior to the office appointment:  LABS: The last test results for Needs to complete the necessary tests prior to appointment: were reviewed.     ASSESSMENT: 54 y/o M with recent rise in PSA but now at baseline     PLAN:   Repeat PSA in six months   Follow up in six months         I spent over 20 minutes with the patient.  Over half this time was spent on counseling for elevated PSA.       Answers for HPI/ROS submitted by the patient on 5/24/2019   General Symptoms: No  Skin Symptoms: No  HENT Symptoms: No  EYE SYMPTOMS: No  HEART SYMPTOMS: No  LUNG SYMPTOMS: No  INTESTINAL SYMPTOMS: No  URINARY SYMPTOMS: No  REPRODUCTIVE SYMPTOMS: No  SKELETAL SYMPTOMS: No  BLOOD SYMPTOMS: No  NERVOUS SYSTEM SYMPTOMS: No  MENTAL HEALTH SYMPTOMS: No

## 2019-05-24 NOTE — NURSING NOTE
New-Elevated PSA    Pt denies all urinary sx and pain.      Chief Complaint   Patient presents with     Consult     ELEVATED PSA       Height 1.829 m (6'), weight 77.1 kg (170 lb). Body mass index is 23.06 kg/m .    Patient Active Problem List   Diagnosis     Ascending aortic aneurysm (H)     Bicuspid aortic valve     Hypertension     Pectus excavatum     Congenital insufficiency of aortic valve       No Known Allergies    Current Outpatient Medications   Medication Sig Dispense Refill     atenolol (TENORMIN) 50 MG tablet Take 50 mg by mouth       atenolol (TENORMIN) 50 MG tablet Take 50 mg by mouth       atorvastatin (LIPITOR) 20 MG tablet Take 1 tablet (20 mg) by mouth daily 90 tablet 3     clobetasol (TEMOVATE) 0.05 % cream Apply 1 Application topically 2 times daily       clobetasol (TEMOVATE) 0.05 % external cream          Social History     Tobacco Use     Smoking status: Never Smoker     Smokeless tobacco: Never Used   Substance Use Topics     Alcohol use: Yes     Comment: ocassional      Drug use: No       Sunday Glaser, EMT  5/24/2019  1:59 PM       No

## 2019-06-11 ENCOUNTER — OFFICE VISIT (OUTPATIENT)
Dept: CARDIOLOGY | Facility: CLINIC | Age: 54
End: 2019-06-11
Attending: INTERNAL MEDICINE
Payer: COMMERCIAL

## 2019-06-11 VITALS
HEART RATE: 70 BPM | WEIGHT: 170 LBS | BODY MASS INDEX: 23.03 KG/M2 | DIASTOLIC BLOOD PRESSURE: 96 MMHG | HEIGHT: 72 IN | RESPIRATION RATE: 16 BRPM | OXYGEN SATURATION: 100 % | SYSTOLIC BLOOD PRESSURE: 136 MMHG

## 2019-06-11 DIAGNOSIS — I71.21 ASCENDING AORTIC ANEURYSM (H): ICD-10-CM

## 2019-06-11 DIAGNOSIS — I71.21 ASCENDING AORTIC ANEURYSM (H): Primary | ICD-10-CM

## 2019-06-11 DIAGNOSIS — R55 VASOVAGAL SYNCOPE: ICD-10-CM

## 2019-06-11 DIAGNOSIS — Q23.1 CONGENITAL INSUFFICIENCY OF AORTIC VALVE: ICD-10-CM

## 2019-06-11 DIAGNOSIS — I10 ESSENTIAL HYPERTENSION: ICD-10-CM

## 2019-06-11 LAB
CHOLEST SERPL-MCNC: 162 MG/DL
HDLC SERPL-MCNC: 58 MG/DL
LDLC SERPL CALC-MCNC: 76 MG/DL
NONHDLC SERPL-MCNC: 104 MG/DL
TRIGL SERPL-MCNC: 138 MG/DL

## 2019-06-11 PROCEDURE — 99214 OFFICE O/P EST MOD 30 MIN: CPT | Mod: ZP | Performed by: INTERNAL MEDICINE

## 2019-06-11 PROCEDURE — G0463 HOSPITAL OUTPT CLINIC VISIT: HCPCS | Mod: ZF

## 2019-06-11 PROCEDURE — 36415 COLL VENOUS BLD VENIPUNCTURE: CPT | Performed by: INTERNAL MEDICINE

## 2019-06-11 PROCEDURE — 80061 LIPID PANEL: CPT | Performed by: INTERNAL MEDICINE

## 2019-06-11 PROCEDURE — 40000269 ZZH STATISTIC NO CHARGE FACILITY FEE: Mod: ZF

## 2019-06-11 RX ORDER — CARVEDILOL 6.25 MG/1
6.25 TABLET ORAL 2 TIMES DAILY WITH MEALS
Qty: 60 TABLET | Refills: 11 | Status: SHIPPED | OUTPATIENT
Start: 2019-06-11 | End: 2019-07-29

## 2019-06-11 RX ORDER — LOSARTAN POTASSIUM 25 MG/1
12.5 TABLET ORAL DAILY
Qty: 60 TABLET | Refills: 3 | Status: SHIPPED | OUTPATIENT
Start: 2019-06-11 | End: 2019-07-15

## 2019-06-11 ASSESSMENT — MIFFLIN-ST. JEOR: SCORE: 1644.11

## 2019-06-11 ASSESSMENT — PAIN SCALES - GENERAL: PAINLEVEL: NO PAIN (0)

## 2019-06-11 NOTE — PROGRESS NOTES
Vascular Cardiology Consultation Follow Up      HPI:     52 year old male with a PMHx of HTN, Bicupsid Aortic Valve with TAA (4.8 cm on previous MRAs) who is here for follow up. Of note, his mother has an aneurysm of her basilar artery diagnosed. No history of BAV or TAA in the family. He is currently on Atenolol 50mg once a day and SBP range between 130-140s. MRA showing TAA is 4.8 cm and is stable.     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 He had presyncopal symptoms leading up to it. Never had syncope in past. Has been taking stable dose of atenolol 50 mg daily and has noticed a high diastolic BP and narrow pulse pressure. HR has been in the 70s. No palpitations. We placed event monitor leading up to today's visit which demonstrated no sustained arrhythmias, no inappropriate sinus tachycardia, no afib and only an isolated episode of SVT (7 beats). Lowest HR was 46 bpm, no pauses. He had note of an IVCD/BBB but not present on EKG.      PAST MEDICAL HISTORY  Past Medical History:   Diagnosis Date     Aortic aneurysm (H)      Ascending aortic aneurysm (H) 3/1/2018    4.7 cm on echo         CURRENT MEDICATIONS  Current Outpatient Medications   Medication Sig Dispense Refill     atenolol (TENORMIN) 50 MG tablet Take 50 mg by mouth       atenolol (TENORMIN) 50 MG tablet Take 50 mg by mouth       atorvastatin (LIPITOR) 20 MG tablet Take 1 tablet (20 mg) by mouth daily 90 tablet 3     clobetasol (TEMOVATE) 0.05 % cream Apply 1 Application topically 2 times daily       clobetasol (TEMOVATE) 0.05 % external cream          PAST SURGICAL HISTORY:  No past surgical history on file.    ALLERGIES   No Known Allergies    FAMILY HISTORY  No family history on file.    VASCULAR FAMILY HISTORY  1st order relative with atherosclerotic PAD: no  1st order relative with AAA: no    SOCIAL HISTORY  Social History     Socioeconomic History      Marital status:      Spouse name: Not on file     Number of children: Not on file     Years of education: Not on file     Highest education level: Not on file   Occupational History     Not on file   Social Needs     Financial resource strain: Not on file     Food insecurity:     Worry: Not on file     Inability: Not on file     Transportation needs:     Medical: Not on file     Non-medical: Not on file   Tobacco Use     Smoking status: Never Smoker     Smokeless tobacco: Never Used   Substance and Sexual Activity     Alcohol use: Yes     Comment: ocassional      Drug use: No     Sexual activity: Not on file   Lifestyle     Physical activity:     Days per week: Not on file     Minutes per session: Not on file     Stress: Not on file   Relationships     Social connections:     Talks on phone: Not on file     Gets together: Not on file     Attends Pentecostal service: Not on file     Active member of club or organization: Not on file     Attends meetings of clubs or organizations: Not on file     Relationship status: Not on file     Intimate partner violence:     Fear of current or ex partner: Not on file     Emotionally abused: Not on file     Physically abused: Not on file     Forced sexual activity: Not on file   Other Topics Concern     Parent/sibling w/ CABG, MI or angioplasty before 65F 55M? Not Asked   Social History Narrative     Not on file       ROS:   Negative unless stated in HPI     EXAM:  BP (!) 136/96 (BP Location: Right arm, Patient Position: Chair, Cuff Size: Adult Regular)   Pulse 70   Resp 16   Ht 1.829 m (6')   Wt 77.1 kg (170 lb)   SpO2 100%   BMI 23.06 kg/m    General: No acute distress   HEENT: NC/AT. Mild bruits right side  CV: RRR, +S1/S2, No murmurs, rubs, gallops.   Pulm: Unlabored breathing, CTAB   GI: s/nt/nd   Ext: No edema   Neuro: No focal defects   Psych: Normal Affect    Labs:      CBC RESULTS:  Lab Results   Component Value Date    WBC 8.0 05/03/2019    RBC 4.84  05/03/2019    HGB 14.2 05/03/2019    HCT 45.1 05/03/2019    MCV 93 05/03/2019    MCH 29.3 05/03/2019    MCHC 31.5 05/03/2019    RDW 12.3 05/03/2019     05/03/2019       BMP RESULTS:  Lab Results   Component Value Date     05/24/2019    POTASSIUM 4.4 05/24/2019    CHLORIDE 105 05/24/2019    CO2 28 05/24/2019    ANIONGAP 5 05/24/2019    GLC 91 05/24/2019    BUN 20 05/24/2019    CR 0.88 05/24/2019    GFRESTIMATED >90 05/24/2019    GFRESTBLACK >90 05/24/2019    HALEY 9.5 05/24/2019        A1C RESULTS:  No results found for: A1C      Procedures:    MRA on 09/2018      Clinical history: 53M with BAV and ascending aortic aneurysm by echo presents for elective outpatient MRA  to evaluate aortic dimension.  Comparison MRA: None     1. Mild dilation of the ascending aorta, 4.4cm in its maximum biorthogonal dimension.     2. The remainder of the aortic root, transverse arch and descending thoracic aorta are normal in size  without an aneurysm or dissection.     2. The aortic arch is left sided. There is normal branching of the arch vessels. There is no coarctation.     3. The main and proximal branch pulmonary arteries are normal in size.      4. The pulmonary venous connections are normal.     CONCLUSIONS: Mild dilation of the ascending aorta measuring 4.4 cm.    MRA 4/2019     1. The ascending aorta is dilated with maximal diameter of 4.8 cm. The aortic root, transverse  arch and descending thoracic aorta are normal in size without aneurysm or dissection.     2. The aortic arch is left sided. There is normal branching of the arch vessels. There is no coarctation.     3. The main and proximal branch pulmonary arteries are normal in size.      4. The systemic venous connections are normal.      5. There is pectus excavatum.          ECHOCARDIOGRAM 2/22/2018  Interpretation Summary  The aortic valve is bicuspid. The aorta is dilated and measures 47 mm at the  level of the proximal ascending segment (Z-score +4.25.)  The aortic root,  indexed to BSA is 23.41 mm/m2 (normal, men: 15+/-2 mm/m2; normal, women: 16+/-  3 mm/m2.)  Left ventricular size is normal. The Ejection Fraction is estimated at 55-60%.  No regional wall motion abnormalities are seen.  The right ventricle is normal size. Global right ventricular function is  normal.  The inferior vena cava was normal in size with preserved respiratory  variability. Estimated mean right atrial pressure is 3 mmHg.  No pericardial effusion is present.  Previous study not available for comparison.  Consider CT or MRA for definitive sizing of the aorta and referral to  cardiology.  ____________________________________________________________________________      Assessment and Plan:     52 year old male with a PMHx of HTN, Bicupsid Aortic Valve with TAA (4.8 cm on MRA) who is here for follow up.    #Thoracic Aortic Aneurysm  - Patient's MRA in 03/2018 showed an aortic size of 4.8 cm. Repeat MRA today is similar at 4.8 cm.  - Repeat MRA Chest/Abdomen/Pelvis in 12 months.  - Will consider surgical repair at 5 cm (valve sparing aortic repair)  -carvedilol 6.25 mg bid and losartan 12.5 mg daily, stop atenolol 50 daily to drive further diastolic pressure downward  -Patient to submit BPs to us for evaluation and titration     #Bicupsid Aortic Valve  -Fusion of RCC and LCC   -No evidence of stenosis of regurgitation on ECHO in 2/2018  -Repeat ECHO before surgery to ensure no need for valve replacement surgery.    -Will eventually need coronary angiogram  -Recommend screening of children and 1st degree relatives for BAV by ECHO.     #Elevated LDL and carotid plaque < 50% on L side  - Started lipitor 20 mg per day.  - Lipid panel at next visit with liver function testing.    #Syncope:  -Vasovagal mediated, event monitor unremarkable for malignant arrhythmias or bradycardia  -Discussed autonomic dysfunction is very common in vascular genetic patients, and behavioral modifications are often  necessary, especially when on a beta blocker for aortic protection.     Follow up 1 year.    Aretha Arzate MD MSc   Aortopathy Clinic  Vascular Genetics, Adult Congenital Section  Division of Cardiology  [facility]

## 2019-06-11 NOTE — PATIENT INSTRUCTIONS
You were seen today in the Adult Congenital and Cardiovascular Genetics Clinic at the [facility].    Cardiology Providers you saw during your visit:  Dr. Aretha Arzate    Diagnosis:  Aortic Aneurysm    Results:  The results of your labs were discussed with you today.     Recommendations:    1.  Carvedilol 6.25 mg twice a day  2. Losartan 12.5 mg once a day  3. Stop atenolol   4. Reviewed event monitor, no change in management at this time     Vitals:    06/11/19 0823   BP: (!) 136/96   BP Location: Right arm   Patient Position: Chair   Cuff Size: Adult Regular   Pulse: 70   Resp: 16   SpO2: 100%   Weight: 77.1 kg (170 lb)   Height: 1.829 m (6')     FASTING CHOLESTEROL was checked in the last 5 years YES_X__  NO___ 2019   Continue to eat a heart healthy, low salt diet.         ____ Fasting lipid panel order today         ____ No changes in medications          ____ I recommend the following changes in your cholesterol medications.:          ____ Please follow up for cholesterol screening at your primary care physician      Follow-up:  Follow up with Dr. Arzate in May 2020 with a MRA of the chest    If you have questions or concerns please contact us at:    Damien Mcallister RN, BSN   Melecio Martinez (Scheduling)  Nurse Coordinator     Clinic   Adult Congenital and CV Genetics Adult Congenital and CV Genetic  [facility]  (P)[phone]    (P) [phone]  [email or web address] (F)[phone]        For after hours urgent needs, call [phone] and ask to speak to the Adult Congenital Physician on call.  Mention Job Code 0401.    For emergencies call 911.    [facility]   Clinics and Surgery Center  Mail Code [id number]  [address] [place] [address]

## 2019-06-11 NOTE — LETTER
6/11/2019      RE: Robson Gutierrez  [address]  [facility] [address]       Dear Colleague,    Thank you for the opportunity to participate in the care of your patient, Robson Gutierrez, at the [facility]. Please see a copy of my visit note below.         Vascular Cardiology Consultation Follow Up      HPI:     52 year old male with a PMHx of HTN, Bicupsid Aortic Valve with TAA (4.8 cm on previous MRAs) who is here for follow up. Of note, his mother has an aneurysm of her basilar artery diagnosed. No history of BAV or TAA in the family. He is currently on Atenolol 50mg once a day and SBP range between 130-140s. MRA showing TAA is 4.8 cm and is stable.     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 He had presyncopal symptoms leading up to it. Never had syncope in past. Has been taking stable dose of atenolol 50 mg daily and has noticed a high diastolic BP and narrow pulse pressure. HR has been in the 70s. No palpitations. We placed event monitor leading up to today's visit which demonstrated no sustained arrhythmias, no inappropriate sinus tachycardia, no afib and only an isolated episode of SVT (7 beats). Lowest HR was 46 bpm, no pauses. He had note of an IVCD/BBB but not present on EKG.      PAST MEDICAL HISTORY  Past Medical History:   Diagnosis Date     Aortic aneurysm (H)      Ascending aortic aneurysm (H) 3/1/2018    4.7 cm on echo         CURRENT MEDICATIONS  Current Outpatient Medications   Medication Sig Dispense Refill     atenolol (TENORMIN) 50 MG tablet Take 50 mg by mouth       atenolol (TENORMIN) 50 MG tablet Take 50 mg by mouth       atorvastatin (LIPITOR) 20 MG tablet Take 1 tablet (20 mg) by mouth daily 90 tablet 3     clobetasol (TEMOVATE) 0.05 % cream Apply 1 Application topically 2 times daily       clobetasol (TEMOVATE) 0.05 % external cream           PAST SURGICAL HISTORY:  No past surgical history on file.    ALLERGIES   No Known Allergies    FAMILY HISTORY  No family history on file.    VASCULAR FAMILY HISTORY  1st order relative with atherosclerotic PAD: no  1st order relative with AAA: no    SOCIAL HISTORY  Social History     Socioeconomic History     Marital status:      Spouse name: Not on file     Number of children: Not on file     Years of education: Not on file     Highest education level: Not on file   Occupational History     Not on file   Social Needs     Financial resource strain: Not on file     Food insecurity:     Worry: Not on file     Inability: Not on file     Transportation needs:     Medical: Not on file     Non-medical: Not on file   Tobacco Use     Smoking status: Never Smoker     Smokeless tobacco: Never Used   Substance and Sexual Activity     Alcohol use: Yes     Comment: ocassional      Drug use: No     Sexual activity: Not on file   Lifestyle     Physical activity:     Days per week: Not on file     Minutes per session: Not on file     Stress: Not on file   Relationships     Social connections:     Talks on phone: Not on file     Gets together: Not on file     Attends Zoroastrianism service: Not on file     Active member of club or organization: Not on file     Attends meetings of clubs or organizations: Not on file     Relationship status: Not on file     Intimate partner violence:     Fear of current or ex partner: Not on file     Emotionally abused: Not on file     Physically abused: Not on file     Forced sexual activity: Not on file   Other Topics Concern     Parent/sibling w/ CABG, MI or angioplasty before 65F 55M? Not Asked   Social History Narrative     Not on file       ROS:   Negative unless stated in HPI     EXAM:  BP (!) 136/96 (BP Location: Right arm, Patient Position: Chair, Cuff Size: Adult Regular)   Pulse 70   Resp 16   Ht 1.829 m (6')   Wt 77.1 kg (170 lb)   SpO2 100%   BMI 23.06 kg/m     General: No  acute distress   HEENT: NC/AT. Mild bruits right side  CV: RRR, +S1/S2, No murmurs, rubs, gallops.   Pulm: Unlabored breathing, CTAB   GI: s/nt/nd   Ext: No edema   Neuro: No focal defects   Psych: Normal Affect    Labs:      CBC RESULTS:  Lab Results   Component Value Date    WBC 8.0 05/03/2019    RBC 4.84 05/03/2019    HGB 14.2 05/03/2019    HCT 45.1 05/03/2019    MCV 93 05/03/2019    MCH 29.3 05/03/2019    MCHC 31.5 05/03/2019    RDW 12.3 05/03/2019     05/03/2019       BMP RESULTS:  Lab Results   Component Value Date     05/24/2019    POTASSIUM 4.4 05/24/2019    CHLORIDE 105 05/24/2019    CO2 28 05/24/2019    ANIONGAP 5 05/24/2019    GLC 91 05/24/2019    BUN 20 05/24/2019    CR 0.88 05/24/2019    GFRESTIMATED >90 05/24/2019    GFRESTBLACK >90 05/24/2019    HALEY 9.5 05/24/2019        A1C RESULTS:  No results found for: A1C      Procedures:    MRA on 09/2018      Clinical history: 53M with BAV and ascending aortic aneurysm by echo presents for elective outpatient MRA  to evaluate aortic dimension.  Comparison MRA: None     1. Mild dilation of the ascending aorta, 4.4cm in its maximum biorthogonal dimension.     2. The remainder of the aortic root, transverse arch and descending thoracic aorta are normal in size  without an aneurysm or dissection.     2. The aortic arch is left sided. There is normal branching of the arch vessels. There is no coarctation.     3. The main and proximal branch pulmonary arteries are normal in size.      4. The pulmonary venous connections are normal.     CONCLUSIONS: Mild dilation of the ascending aorta measuring 4.4 cm.    MRA 4/2019     1. The ascending aorta is dilated with maximal diameter of 4.8 cm. The aortic root, transverse  arch and descending thoracic aorta are normal in size without aneurysm or dissection.     2. The aortic arch is left sided. There is normal branching of the arch vessels. There is no coarctation.     3. The main and proximal branch pulmonary  arteries are normal in size.      4. The systemic venous connections are normal.      5. There is pectus excavatum.          ECHOCARDIOGRAM 2/22/2018  Interpretation Summary  The aortic valve is bicuspid. The aorta is dilated and measures 47 mm at the  level of the proximal ascending segment (Z-score +4.25.) The aortic root,  indexed to BSA is 23.41 mm/m2 (normal, men: 15+/-2 mm/m2; normal, women: 16+/-  3 mm/m2.)  Left ventricular size is normal. The Ejection Fraction is estimated at 55-60%.  No regional wall motion abnormalities are seen.  The right ventricle is normal size. Global right ventricular function is  normal.  The inferior vena cava was normal in size with preserved respiratory  variability. Estimated mean right atrial pressure is 3 mmHg.  No pericardial effusion is present.  Previous study not available for comparison.  Consider CT or MRA for definitive sizing of the aorta and referral to  cardiology.  ____________________________________________________________________________      Assessment and Plan:     52 year old male with a PMHx of HTN, Bicupsid Aortic Valve with TAA (4.8 cm on MRA) who is here for follow up.    #Thoracic Aortic Aneurysm  - Patient's MRA in 03/2018 showed an aortic size of 4.8 cm. Repeat MRA today is similar at 4.8 cm.  - Repeat MRA Chest/Abdomen/Pelvis in 12 months.  - Will consider surgical repair at 5 cm (valve sparing aortic repair)  -carvedilol 6.25 mg bid and losartan 12.5 mg daily, stop atenolol 50 daily to drive further diastolic pressure downward  -Patient to submit BPs to us for evaluation and titration     #Bicupsid Aortic Valve  -Fusion of RCC and LCC   -No evidence of stenosis of regurgitation on ECHO in 2/2018  -Repeat ECHO before surgery to ensure no need for valve replacement surgery.    -Will eventually need coronary angiogram  -Recommend screening of children and 1st degree relatives for BAV by ECHO.     #Elevated LDL and carotid plaque < 50% on L side  -  Started lipitor 20 mg per day.  - Lipid panel at next visit with liver function testing.    #Syncope:  -Vasovagal mediated, event monitor unremarkable for malignant arrhythmias or bradycardia  -Discussed autonomic dysfunction is very common in vascular genetic patients, and behavioral modifications are often necessary, especially when on a beta blocker for aortic protection.     Follow up 1 year.    Aretha Arzate MD MSc   Aortopathy Clinic  Vascular Genetics, Adult Congenital Section  Division of Cardiology  [facility]

## 2019-06-11 NOTE — NURSING NOTE
Cardiac Testing: Patient given instructions regarding MRA of chest. Discussed purpose, preparation, procedure and when to expect results reported back to the patient. Patient demonstrated understanding of this information and agreed to call with further questions or concerns.    Labs: Patient was given results of the laboratory testing obtained today. Patient demonstrated understanding of this information and agreed to call with further questions or concerns.     Med Reconcile: Reviewed and verified all current medications with the patient. The updated medication list was printed and given to the patient.    New Medication: Coreg 6.25 mg BID. Losartan 12.5 mg.  Patient was educated regarding newly prescribed medication, including discussion of  the indication, administration, side effects, and when to report to MD or RN. Patient demonstrated understanding of this information and agreed to call with further questions or concerns.    Return Appointment: Follow up with Dr Arzate May 2020 with MRA of the chest. Patient given instructions regarding scheduling next clinic visit. Patient demonstrated understanding of this information and agreed to call with further questions or concerns.    Medication Change: Stop Atenolol.  Patient was educated regarding prescribed medication change, including discussion of the indication, administration, side effects, and when to report to MD or RN. Patient demonstrated understanding of this information and agreed to call with further questions or concerns.    Patient stated he understood all health information given and agreed to call with further questions or concerns.

## 2019-06-11 NOTE — NURSING NOTE
Chief Complaint   Patient presents with     Follow Up      reason for visit: 54 y/o for follow up ascending aortic aneurysm     Mer Salazar CMA  Pt was due for PHQ2. Pt completed. Updated health maintenance. See Screenings.

## 2019-07-15 DIAGNOSIS — I71.21 ASCENDING AORTIC ANEURYSM (H): ICD-10-CM

## 2019-07-15 DIAGNOSIS — I10 ESSENTIAL HYPERTENSION: ICD-10-CM

## 2019-07-15 RX ORDER — LOSARTAN POTASSIUM 25 MG/1
25 TABLET ORAL DAILY
Qty: 90 TABLET | Refills: 3 | Status: SHIPPED | OUTPATIENT
Start: 2019-07-15 | End: 2023-10-24

## 2019-07-29 DIAGNOSIS — I71.21 ASCENDING AORTIC ANEURYSM (H): ICD-10-CM

## 2019-07-29 DIAGNOSIS — I10 ESSENTIAL HYPERTENSION: ICD-10-CM

## 2019-07-29 RX ORDER — CARVEDILOL 25 MG/1
25 TABLET ORAL 2 TIMES DAILY WITH MEALS
Qty: 180 TABLET | Refills: 3 | Status: SHIPPED | OUTPATIENT
Start: 2019-07-29 | End: 2019-07-29

## 2019-07-29 RX ORDER — CARVEDILOL 12.5 MG/1
12.5 TABLET ORAL 2 TIMES DAILY WITH MEALS
Qty: 180 TABLET | Refills: 3 | Status: SHIPPED | OUTPATIENT
Start: 2019-07-29 | End: 2019-11-21 | Stop reason: DRUGHIGH

## 2019-10-07 ENCOUNTER — DOCUMENTATION ONLY (OUTPATIENT)
Dept: CARE COORDINATION | Facility: CLINIC | Age: 54
End: 2019-10-07

## 2019-10-31 DIAGNOSIS — R55 VASOVAGAL SYNCOPE: ICD-10-CM

## 2019-10-31 DIAGNOSIS — I10 ESSENTIAL HYPERTENSION: ICD-10-CM

## 2019-10-31 DIAGNOSIS — I71.21 ASCENDING AORTIC ANEURYSM (H): Primary | ICD-10-CM

## 2019-11-01 ENCOUNTER — OFFICE VISIT (OUTPATIENT)
Dept: CARDIOLOGY | Facility: CLINIC | Age: 54
End: 2019-11-01
Attending: INTERNAL MEDICINE
Payer: COMMERCIAL

## 2019-11-01 VITALS
WEIGHT: 182.5 LBS | HEIGHT: 72 IN | BODY MASS INDEX: 24.72 KG/M2 | OXYGEN SATURATION: 97 % | SYSTOLIC BLOOD PRESSURE: 121 MMHG | DIASTOLIC BLOOD PRESSURE: 83 MMHG | HEART RATE: 98 BPM

## 2019-11-01 DIAGNOSIS — I71.21 ASCENDING AORTIC ANEURYSM (H): Primary | ICD-10-CM

## 2019-11-01 PROCEDURE — G0463 HOSPITAL OUTPT CLINIC VISIT: HCPCS | Mod: ZF

## 2019-11-01 PROCEDURE — 99214 OFFICE O/P EST MOD 30 MIN: CPT | Mod: ZP | Performed by: INTERNAL MEDICINE

## 2019-11-01 ASSESSMENT — MIFFLIN-ST. JEOR: SCORE: 1695.81

## 2019-11-01 ASSESSMENT — PAIN SCALES - GENERAL: PAINLEVEL: NO PAIN (0)

## 2019-11-01 NOTE — LETTER
11/1/2019      RE: Robson Gutierrez  [address]  [facility] [address]       Dear Colleague,    Thank you for the opportunity to participate in the care of your patient, Robson Gutierrez, at the [facility] at [facility]. Please see a copy of my visit note below.         Vascular Cardiology Consultation Follow Up      HPI:     54 year old male with a PMHx of HTN, Bicupsid Aortic Valve with TAA (4.8 cm on previous MRAs) who is here for follow up. Of note, his mother has an aneurysm of her basilar artery diagnosed. No history of BAV or TAA in the family. He is currently on Atenolol 50mg once a day and SBP range between 130-140s. MRA showing TAA is 4.8 cm and is stable.     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 He had presyncopal symptoms leading up to it. Never had syncope in past. Has been taking stable dose of atenolol 50 mg daily and has noticed a high diastolic BP and narrow pulse pressure. HR has been in the 70s. No palpitations. We placed event monitor leading up to today's visit which demonstrated no sustained arrhythmias, no inappropriate sinus tachycardia, no afib and only an isolated episode of SVT (7 beats). Lowest HR was 46 bpm, no pauses. He had note of an IVCD/BBB but not present on EKG.    He is here today for medication titration and follow up. No palpitations since last visit, feels overall better.         PAST MEDICAL HISTORY  Past Medical History:   Diagnosis Date     Aortic aneurysm (H)      Ascending aortic aneurysm (H) 3/1/2018    4.7 cm on echo         Current Outpatient Medications   Medication     atorvastatin (LIPITOR) 20 MG tablet     carvedilol (COREG) 12.5 MG tablet     clobetasol (TEMOVATE) 0.05 % cream     losartan (COZAAR) 25 MG tablet     No current facility-administered medications for this visit.        PAST SURGICAL HISTORY:  No past surgical  history on file.    ALLERGIES   No Known Allergies    FAMILY HISTORY  No family history on file.    VASCULAR FAMILY HISTORY  1st order relative with atherosclerotic PAD: no  1st order relative with AAA: no    SOCIAL HISTORY  Social History     Socioeconomic History     Marital status:      Spouse name: Not on file     Number of children: Not on file     Years of education: Not on file     Highest education level: Not on file   Occupational History     Not on file   Social Needs     Financial resource strain: Not on file     Food insecurity:     Worry: Not on file     Inability: Not on file     Transportation needs:     Medical: Not on file     Non-medical: Not on file   Tobacco Use     Smoking status: Never Smoker     Smokeless tobacco: Never Used   Substance and Sexual Activity     Alcohol use: Yes     Comment: ocassional      Drug use: No     Sexual activity: Not on file   Lifestyle     Physical activity:     Days per week: Not on file     Minutes per session: Not on file     Stress: Not on file   Relationships     Social connections:     Talks on phone: Not on file     Gets together: Not on file     Attends Faith service: Not on file     Active member of club or organization: Not on file     Attends meetings of clubs or organizations: Not on file     Relationship status: Not on file     Intimate partner violence:     Fear of current or ex partner: Not on file     Emotionally abused: Not on file     Physically abused: Not on file     Forced sexual activity: Not on file   Other Topics Concern     Parent/sibling w/ CABG, MI or angioplasty before 65F 55M? Not Asked   Social History Narrative     Not on file       ROS:   Negative unless stated in HPI     EXAM:  /83 (BP Location: Right arm, Patient Position: Chair, Cuff Size: Adult Regular)   Pulse 98   Ht 1.829 m (6')   Wt 82.8 kg (182 lb 8 oz)   SpO2 97%   BMI 24.75 kg/m     General: No acute distress   HEENT: NC/AT. Mild bruits right side  CV:  RRR, +S1/S2, No murmurs, rubs, gallops.   Pulm: Unlabored breathing, CTAB   GI: s/nt/nd   Ext: No edema   Neuro: No focal defects   Psych: Normal Affect    Labs:      CBC RESULTS:  Lab Results   Component Value Date    WBC 8.0 05/03/2019    RBC 4.84 05/03/2019    HGB 14.2 05/03/2019    HCT 45.1 05/03/2019    MCV 93 05/03/2019    MCH 29.3 05/03/2019    MCHC 31.5 05/03/2019    RDW 12.3 05/03/2019     05/03/2019       BMP RESULTS:  Lab Results   Component Value Date     05/24/2019    POTASSIUM 4.4 05/24/2019    CHLORIDE 105 05/24/2019    CO2 28 05/24/2019    ANIONGAP 5 05/24/2019    GLC 91 05/24/2019    BUN 20 05/24/2019    CR 0.88 05/24/2019    GFRESTIMATED >90 05/24/2019    GFRESTBLACK >90 05/24/2019    HALEY 9.5 05/24/2019        A1C RESULTS:  No results found for: A1C      Procedures:    MRA on 09/2018      Clinical history: 53M with BAV and ascending aortic aneurysm by echo presents for elective outpatient MRA  to evaluate aortic dimension.  Comparison MRA: None     1. Mild dilation of the ascending aorta, 4.4cm in its maximum biorthogonal dimension.     2. The remainder of the aortic root, transverse arch and descending thoracic aorta are normal in size  without an aneurysm or dissection.     2. The aortic arch is left sided. There is normal branching of the arch vessels. There is no coarctation.     3. The main and proximal branch pulmonary arteries are normal in size.      4. The pulmonary venous connections are normal.     CONCLUSIONS: Mild dilation of the ascending aorta measuring 4.4 cm.    MRA 4/2019     1. The ascending aorta is dilated with maximal diameter of 4.8 cm. The aortic root, transverse  arch and descending thoracic aorta are normal in size without aneurysm or dissection.     2. The aortic arch is left sided. There is normal branching of the arch vessels. There is no coarctation.     3. The main and proximal branch pulmonary arteries are normal in size.      4. The systemic venous  connections are normal.      5. There is pectus excavatum.          ECHOCARDIOGRAM 2/22/2018  Interpretation Summary  The aortic valve is bicuspid. The aorta is dilated and measures 47 mm at the  level of the proximal ascending segment (Z-score +4.25.) The aortic root,  indexed to BSA is 23.41 mm/m2 (normal, men: 15+/-2 mm/m2; normal, women: 16+/-  3 mm/m2.)  Left ventricular size is normal. The Ejection Fraction is estimated at 55-60%.  No regional wall motion abnormalities are seen.  The right ventricle is normal size. Global right ventricular function is  normal.  The inferior vena cava was normal in size with preserved respiratory  variability. Estimated mean right atrial pressure is 3 mmHg.  No pericardial effusion is present.  Previous study not available for comparison.  Consider CT or MRA for definitive sizing of the aorta and referral to  cardiology.  ____________________________________________________________________________      Assessment and Plan:     52 year old male with a PMHx of HTN, Bicupsid Aortic Valve with TAA (4.8 cm on MRA) who is here for follow up.    #Thoracic Aortic Aneurysm  - Patient's MRA in 03/2018 showed an aortic size of 4.8 cm. Repeat MRA 4.8 cm.  - Repeat MRA q6-12 months  - Will consider surgical repair at 5 cm (valve sparing aortic repair)  - Coreg 25 mg bid - will see if improves diastolic bp , goal  < 80   - MRA in summer 2020 - follow up after that     #Bicupsid Aortic Valve  -Fusion of RCC and LCC   -No evidence of stenosis of regurgitation on ECHO in 2/2018  -Echo surveillance for valve function     -Will eventually need coronary angiogram  -Recommend screening of children and 1st degree relatives for BAV by ECHO.     #Elevated LDL and carotid plaque < 50% on L side  - Started lipitor 20 mg per day.  - Lipid panel surveillance     #Syncope:  -Vasovagal mediated, event monitor unremarkable for malignant arrhythmias or bradycardia  -Discussed autonomic dysfunction is very  common in vascular genetic patients, and behavioral modifications are often necessary, especially when on a beta blocker for aortic protection.     Follow up in 6 months     Aretha Arzate MD MSc  Division of Cardiology  [facility]

## 2019-11-01 NOTE — PROGRESS NOTES
Vascular Cardiology Consultation Follow Up      HPI:     54 year old male with a PMHx of HTN, Bicupsid Aortic Valve with TAA (4.8 cm on previous MRAs) who is here for follow up. Of note, his mother has an aneurysm of her basilar artery diagnosed. No history of BAV or TAA in the family. He is currently on Atenolol 50mg once a day and SBP range between 130-140s. MRA showing TAA is 4.8 cm and is stable.     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 He had presyncopal symptoms leading up to it. Never had syncope in past. Has been taking stable dose of atenolol 50 mg daily and has noticed a high diastolic BP and narrow pulse pressure. HR has been in the 70s. No palpitations. We placed event monitor leading up to today's visit which demonstrated no sustained arrhythmias, no inappropriate sinus tachycardia, no afib and only an isolated episode of SVT (7 beats). Lowest HR was 46 bpm, no pauses. He had note of an IVCD/BBB but not present on EKG.    He is here today for medication titration and follow up. No palpitations since last visit, feels overall better.         PAST MEDICAL HISTORY  Past Medical History:   Diagnosis Date     Aortic aneurysm (H)      Ascending aortic aneurysm (H) 3/1/2018    4.7 cm on echo         Current Outpatient Medications   Medication     atorvastatin (LIPITOR) 20 MG tablet     carvedilol (COREG) 12.5 MG tablet     clobetasol (TEMOVATE) 0.05 % cream     losartan (COZAAR) 25 MG tablet     No current facility-administered medications for this visit.        PAST SURGICAL HISTORY:  No past surgical history on file.    ALLERGIES   No Known Allergies    FAMILY HISTORY  No family history on file.    VASCULAR FAMILY HISTORY  1st order relative with atherosclerotic PAD: no  1st order relative with AAA: no    SOCIAL HISTORY  Social History     Socioeconomic History     Marital status:      Spouse name: Not  on file     Number of children: Not on file     Years of education: Not on file     Highest education level: Not on file   Occupational History     Not on file   Social Needs     Financial resource strain: Not on file     Food insecurity:     Worry: Not on file     Inability: Not on file     Transportation needs:     Medical: Not on file     Non-medical: Not on file   Tobacco Use     Smoking status: Never Smoker     Smokeless tobacco: Never Used   Substance and Sexual Activity     Alcohol use: Yes     Comment: ocassional      Drug use: No     Sexual activity: Not on file   Lifestyle     Physical activity:     Days per week: Not on file     Minutes per session: Not on file     Stress: Not on file   Relationships     Social connections:     Talks on phone: Not on file     Gets together: Not on file     Attends Gnosticism service: Not on file     Active member of club or organization: Not on file     Attends meetings of clubs or organizations: Not on file     Relationship status: Not on file     Intimate partner violence:     Fear of current or ex partner: Not on file     Emotionally abused: Not on file     Physically abused: Not on file     Forced sexual activity: Not on file   Other Topics Concern     Parent/sibling w/ CABG, MI or angioplasty before 65F 55M? Not Asked   Social History Narrative     Not on file       ROS:   Negative unless stated in HPI     EXAM:  /83 (BP Location: Right arm, Patient Position: Chair, Cuff Size: Adult Regular)   Pulse 98   Ht 1.829 m (6')   Wt 82.8 kg (182 lb 8 oz)   SpO2 97%   BMI 24.75 kg/m    General: No acute distress   HEENT: NC/AT. Mild bruits right side  CV: RRR, +S1/S2, No murmurs, rubs, gallops.   Pulm: Unlabored breathing, CTAB   GI: s/nt/nd   Ext: No edema   Neuro: No focal defects   Psych: Normal Affect    Labs:      CBC RESULTS:  Lab Results   Component Value Date    WBC 8.0 05/03/2019    RBC 4.84 05/03/2019    HGB 14.2 05/03/2019    HCT 45.1 05/03/2019    MCV 93  05/03/2019    MCH 29.3 05/03/2019    MCHC 31.5 05/03/2019    RDW 12.3 05/03/2019     05/03/2019       BMP RESULTS:  Lab Results   Component Value Date     05/24/2019    POTASSIUM 4.4 05/24/2019    CHLORIDE 105 05/24/2019    CO2 28 05/24/2019    ANIONGAP 5 05/24/2019    GLC 91 05/24/2019    BUN 20 05/24/2019    CR 0.88 05/24/2019    GFRESTIMATED >90 05/24/2019    GFRESTBLACK >90 05/24/2019    HALEY 9.5 05/24/2019        A1C RESULTS:  No results found for: A1C      Procedures:    MRA on 09/2018      Clinical history: 53M with BAV and ascending aortic aneurysm by echo presents for elective outpatient MRA  to evaluate aortic dimension.  Comparison MRA: None     1. Mild dilation of the ascending aorta, 4.4cm in its maximum biorthogonal dimension.     2. The remainder of the aortic root, transverse arch and descending thoracic aorta are normal in size  without an aneurysm or dissection.     2. The aortic arch is left sided. There is normal branching of the arch vessels. There is no coarctation.     3. The main and proximal branch pulmonary arteries are normal in size.      4. The pulmonary venous connections are normal.     CONCLUSIONS: Mild dilation of the ascending aorta measuring 4.4 cm.    MRA 4/2019     1. The ascending aorta is dilated with maximal diameter of 4.8 cm. The aortic root, transverse  arch and descending thoracic aorta are normal in size without aneurysm or dissection.     2. The aortic arch is left sided. There is normal branching of the arch vessels. There is no coarctation.     3. The main and proximal branch pulmonary arteries are normal in size.      4. The systemic venous connections are normal.      5. There is pectus excavatum.          ECHOCARDIOGRAM 2/22/2018  Interpretation Summary  The aortic valve is bicuspid. The aorta is dilated and measures 47 mm at the  level of the proximal ascending segment (Z-score +4.25.) The aortic root,  indexed to BSA is 23.41 mm/m2 (normal, men: 15+/-2  mm/m2; normal, women: 16+/-  3 mm/m2.)  Left ventricular size is normal. The Ejection Fraction is estimated at 55-60%.  No regional wall motion abnormalities are seen.  The right ventricle is normal size. Global right ventricular function is  normal.  The inferior vena cava was normal in size with preserved respiratory  variability. Estimated mean right atrial pressure is 3 mmHg.  No pericardial effusion is present.  Previous study not available for comparison.  Consider CT or MRA for definitive sizing of the aorta and referral to  cardiology.  ____________________________________________________________________________      Assessment and Plan:     52 year old male with a PMHx of HTN, Bicupsid Aortic Valve with TAA (4.8 cm on MRA) who is here for follow up.    #Thoracic Aortic Aneurysm  - Patient's MRA in 03/2018 showed an aortic size of 4.8 cm. Repeat MRA 4.8 cm.  - Repeat MRA q6-12 months  - Will consider surgical repair at 5 cm (valve sparing aortic repair)  - Coreg 25 mg bid - will see if improves diastolic bp , goal  < 80   - MRA in summer 2020 - follow up after that     #Bicupsid Aortic Valve  -Fusion of RCC and LCC   -No evidence of stenosis of regurgitation on ECHO in 2/2018  -Echo surveillance for valve function     -Will eventually need coronary angiogram  -Recommend screening of children and 1st degree relatives for BAV by ECHO.     #Elevated LDL and carotid plaque < 50% on L side  - Started lipitor 20 mg per day.  - Lipid panel surveillance     #Syncope:  -Vasovagal mediated, event monitor unremarkable for malignant arrhythmias or bradycardia  -Discussed autonomic dysfunction is very common in vascular genetic patients, and behavioral modifications are often necessary, especially when on a beta blocker for aortic protection.     Follow up in 6 months     Aretha Arzate MD MSc  Division of Cardiology  [facility]

## 2019-11-01 NOTE — NURSING NOTE
Cardiac Testing: Patient given instructions regarding chest MRA. Discussed purpose, preparation, procedure and when to expect results reported back to the patient. Patient demonstrated understanding of this information and agreed to call with further questions or concerns.  Diet: Patient instructed regarding a heart healthy diet, including discussion of reduced fat and sodium intake. Patient demonstrated understanding of this information and agreed to call with further questions or concerns.  Med Reconcile: Reviewed and verified all current medications with the patient. The updated medication list was printed and given to the patient.  Return Appointment: Patient given instructions regarding scheduling next clinic visit. Patient demonstrated understanding of this information and agreed to call with further questions or concerns.  Medication Change: Increased coreg to 25mg BID. Will update chart in a week after hearing how patient tolerates. Patient was educated regarding prescribed medication change, including discussion of the indication, administration, side effects, and when to report to MD or RN. Patient demonstrated understanding of this information and agreed to call with further questions or concerns.  Patient stated he understood all health information given and agreed to call with further questions or concerns.

## 2019-11-01 NOTE — NURSING NOTE
Vitals completed successfully and medication reconciled.     Shonda Bhatt, MAMIE  8:48 AM  Chief Complaint   Patient presents with     Follow Up     54 year old male with history of ascending aortic aneurysm presenting for follow up.

## 2019-11-01 NOTE — PATIENT INSTRUCTIONS
You were seen today in the Adult Congenital and Cardiovascular Genetics Clinic at the [facility].    Cardiology Providers you saw during your visit:  Aretha Arzate MD    Diagnosis:  Ascending aortic aneurysm    Results:  No testing today.     Recommendations:    1. Continue to eat a heart healthy, low salt diet.  2. Continue to get 20-30 minutes of aerobic activity, 4-5 days per week.  Examples of aerobic activity include walking, running, swimming, cycling, etc.  3. Continue to observe good oral hygiene, with regular dental visits  4. MRA chest in 4-6 months  5. Increase Coreg to 25 mg twice a day and call us with response   6. Obtain imaging on CD via Medical Records       SBE prophylaxis:   Yes____  No_X___    Lifelong Bacterial Endocarditis Prophylaxis:  YES____  NO____    If YES is checked, follow the recommendations outlined below:  1. Take antibiotic(s) prior to recommended dental procedures and procedures on the respiratory tract or with infected skin, muscle or bones. SBE prophylaxis is not needed for routine GI and  procedures (ie. Colonoscopy or vaginal delivery)  2. Observe good oral hygiene daily, as advised by your dentist. Get regular professional dental care.  3. Keep cuts clean.  4. Infections should be treated promptly.  5. Symptoms of Infective Endocarditis could include: fever lasting more than 4-5 days or a recurrent fever that initially resolves but returns within 1-2 days)      Exercise restrictions:   Yes__X__  No____         If yes, list restrictions:  Must be allowed to rest if fatigued or SOB      Work restrictions:  Yes____  No_X___         If yes, list restrictions:    FASTING CHOLESTEROL was checked in the last 5 years YES_X__  NO___ (2019)  Continue to eat a heart healthy, low salt diet.         ____ Fasting lipid panel order today         ____ No changes in medications          ____ I recommend the following changes in your cholesterol medications.:          ____  Please follow up for cholesterol screening at your primary care physician      Follow-up:  Follow up [facility] in Fall 2020- Please call [phone] to schedule your follow up with Dr Arzate at [facility].    If you have questions or concerns please contact us at:    Ashleigh King, MSN, RN, CNL    Zena Burns (Scheduling)  Nurse Care Coordinator     Clinic   Adult Congenital and CV Genetics   Adult Congenital and CV Genetic  [facility]   [facility]  (P) [phone]     (P) [phone]  [email or web address]   (F) [phone]        For after hours urgent needs, call [phone] and ask to speak to the Adult Congenital Physician on call.  Mention Job Code 0401.    For emergencies call 911.    [facility]  [facility]  Mail Code [id number]  [address] [place]  [address]

## 2019-11-20 ENCOUNTER — PRE VISIT (OUTPATIENT)
Dept: UROLOGY | Facility: CLINIC | Age: 54
End: 2019-11-20

## 2019-11-20 NOTE — TELEPHONE ENCOUNTER
Chief Complaint : Return-six mo    Hx/Sx: Elevated PSA    Records/Orders: PSA scheduled     Pt Contacted: n/a    At Rooming: Normal

## 2019-11-21 ENCOUNTER — MYC REFILL (OUTPATIENT)
Dept: CARDIOLOGY | Facility: CLINIC | Age: 54
End: 2019-11-21

## 2019-11-21 DIAGNOSIS — I10 ESSENTIAL HYPERTENSION: Primary | ICD-10-CM

## 2019-11-21 DIAGNOSIS — I71.21 ASCENDING AORTIC ANEURYSM (H): ICD-10-CM

## 2019-11-21 RX ORDER — CARVEDILOL 12.5 MG/1
12.5 TABLET ORAL 2 TIMES DAILY WITH MEALS
Qty: 180 TABLET | Refills: 3 | Status: CANCELLED | OUTPATIENT
Start: 2019-11-21

## 2019-11-21 RX ORDER — CARVEDILOL 25 MG/1
25 TABLET ORAL 2 TIMES DAILY WITH MEALS
Qty: 180 TABLET | Refills: 3 | Status: SHIPPED | OUTPATIENT
Start: 2019-11-21 | End: 2019-12-19 | Stop reason: SINTOL

## 2019-11-21 NOTE — TELEPHONE ENCOUNTER
New RX sent to Pharmacy for Coreg 25 mg tab, BID per Dr. Arzate office note              Patient Comment: NEED A NEW Rx for 2x25mg instead of 2x12.5 !!!!!!!!!!!!!     carvedilol (COREG) 12.5 MG tablet      1 tablet (12.5 mg) by mouth 2 times daily (with meals)  Last Written Prescription Date:  7-29-19  Last Fill Quantity: 180,   # refills: 3  Last Office Visit : 11-1-19,  unsigned  Future Office visit:  none    Routing refill request to provider for review/approval because:  Pt requesting different dosage

## 2019-12-05 DIAGNOSIS — R97.20 ELEVATED PROSTATE SPECIFIC ANTIGEN (PSA): ICD-10-CM

## 2019-12-05 LAB — PSA SERPL-MCNC: 1.69 UG/L (ref 0–4)

## 2019-12-06 ENCOUNTER — OFFICE VISIT (OUTPATIENT)
Dept: UROLOGY | Facility: CLINIC | Age: 54
End: 2019-12-06
Payer: COMMERCIAL

## 2019-12-06 VITALS
DIASTOLIC BLOOD PRESSURE: 81 MMHG | HEART RATE: 72 BPM | WEIGHT: 180 LBS | BODY MASS INDEX: 24.38 KG/M2 | HEIGHT: 72 IN | SYSTOLIC BLOOD PRESSURE: 112 MMHG

## 2019-12-06 DIAGNOSIS — R97.20 ELEVATED PROSTATE SPECIFIC ANTIGEN (PSA): Primary | ICD-10-CM

## 2019-12-06 ASSESSMENT — PAIN SCALES - GENERAL: PAINLEVEL: NO PAIN (0)

## 2019-12-06 ASSESSMENT — MIFFLIN-ST. JEOR: SCORE: 1684.47

## 2019-12-06 NOTE — PROGRESS NOTES
It was my pleasure to see Robson Gutierrez a 53 year old year old male today. Patient was seen in consultation for elevated PSA.    HPI:     Patient presents for evaluation and management of elevated PSA.  Patient has had two PSAs at [facility]    PSA trend:   02/2018 1.6  04/2019 2.5  05/2019 1.59  11/2019 1.69    Repeat PSA today was 1.69    Patient denies lower urinary tract symptoms including frequency, urgency, nocturia.       We discussed PSA trend. Discussed fluctuations and further follow up.       Past Medical History:   Diagnosis Date     Aortic aneurysm (H)      Ascending aortic aneurysm (H) 3/1/2018    4.7 cm on echo       History reviewed. No pertinent surgical history.    History reviewed. No pertinent family history.    Current Outpatient Medications   Medication Sig Dispense Refill     atorvastatin (LIPITOR) 20 MG tablet Take 1 tablet (20 mg) by mouth daily 90 tablet 3     carvedilol (COREG) 25 MG tablet Take 1 tablet (25 mg) by mouth 2 times daily (with meals) 180 tablet 3     clobetasol (TEMOVATE) 0.05 % cream Apply 1 Application topically 2 times daily       losartan (COZAAR) 25 MG tablet Take 1 tablet (25 mg) by mouth daily 90 tablet 3       ALLERGIES: Patient has no known allergies.      REVIEW OF SYSTEMS:  Skin: negative  Eyes: negative  Ears/Nose/Throat: negative  Respiratory: No shortness of breath, dyspnea on exertion, cough, or hemoptysis  Cardiovascular: negative  Gastrointestinal: negative  Genitourinary: as above  Musculoskeletal: negative  Neurologic: negative  Psychiatric: negative  Hematologic/Lymphatic/Immunologic: negative  Endocrine: negative      GENERAL PHYSICAL EXAM:   Vitals: /81   Pulse 72   Ht 1.829 m (6')   Wt 81.6 kg (180 lb)   BMI 24.41 kg/m    Body mass index is 24.41 kg/m .  Constitutional: healthy, alert and no distress  Head: Normocephalic  Neck: Neck supple  Cardiovascular: negative  Respiratory: negative  Gastrointestinal: Abdomen soft,  non-tender  Musculoskeletal: extremities normal  Skin: no suspicious lesions or rashes  Neurologic: Gait normal  Psychiatric: affect normal/bright and mentation appears normal.       EXAM:   Left Flank: negative  Right Flank: negative        RECTAL EXAM:            RADIOLOGY: The following tests were reviewed: Need to complete the following Radiology exams prior to the office appointment:  LABS: The last test results for Needs to complete the necessary tests prior to appointment: were reviewed.     ASSESSMENT: 54 y/o M with recent rise in PSA but now at baseline     PLAN:   Repeat PSA in six months   Follow up in six months   Patient will have with PCP and follow up with urology PRN

## 2019-12-06 NOTE — NURSING NOTE
Return-PSA Follow Up      He denies any urinary sx or pains.      Chief Complaint   Patient presents with     RECHECK     Elevated PSA       Blood pressure 112/81, pulse 72, height 1.829 m (6'), weight 81.6 kg (180 lb). Body mass index is 24.41 kg/m .    Patient Active Problem List   Diagnosis     Ascending aortic aneurysm (H)     Bicuspid aortic valve     Hypertension     Pectus excavatum     Congenital insufficiency of aortic valve       No Known Allergies    Current Outpatient Medications   Medication Sig Dispense Refill     atorvastatin (LIPITOR) 20 MG tablet Take 1 tablet (20 mg) by mouth daily 90 tablet 3     carvedilol (COREG) 25 MG tablet Take 1 tablet (25 mg) by mouth 2 times daily (with meals) 180 tablet 3     clobetasol (TEMOVATE) 0.05 % cream Apply 1 Application topically 2 times daily       losartan (COZAAR) 25 MG tablet Take 1 tablet (25 mg) by mouth daily 90 tablet 3       Social History     Tobacco Use     Smoking status: Never Smoker     Smokeless tobacco: Never Used   Substance Use Topics     Alcohol use: Yes     Comment: ocassional      Drug use: No       Sunday Glaser EMT, EMT  12/6/2019  10:20 AM

## 2019-12-06 NOTE — LETTER
12/6/2019       RE: Robson Gutierrez  [address]  [facility] [id number]     Dear Colleague,    Thank you for referring your patient, Robson Gutierrez, to the [facility] AND [facility]ST FOR PROSTATE AND UROLOGIC CANCERS at [facility]. Please see a copy of my visit note below.    It was my pleasure to see Robson Gutierrez a 53 year old year old male today. Patient was seen in consultation for elevated PSA.    HPI:     Patient presents for evaluation and management of elevated PSA.  Patient has had two PSAs at [facility]    PSA trend:   02/2018 1.6  04/2019 2.5  05/2019 1.59  11/2019 1.69    Repeat PSA today was 1.69    Patient denies lower urinary tract symptoms including frequency, urgency, nocturia.       We discussed PSA trend. Discussed fluctuations and further follow up.       Past Medical History:   Diagnosis Date     Aortic aneurysm (H)      Ascending aortic aneurysm (H) 3/1/2018    4.7 cm on echo       History reviewed. No pertinent surgical history.    History reviewed. No pertinent family history.    Current Outpatient Medications   Medication Sig Dispense Refill     atorvastatin (LIPITOR) 20 MG tablet Take 1 tablet (20 mg) by mouth daily 90 tablet 3     carvedilol (COREG) 25 MG tablet Take 1 tablet (25 mg) by mouth 2 times daily (with meals) 180 tablet 3     clobetasol (TEMOVATE) 0.05 % cream Apply 1 Application topically 2 times daily       losartan (COZAAR) 25 MG tablet Take 1 tablet (25 mg) by mouth daily 90 tablet 3       ALLERGIES: Patient has no known allergies.      REVIEW OF SYSTEMS:  Skin: negative  Eyes: negative  Ears/Nose/Throat: negative  Respiratory: No shortness of breath, dyspnea on exertion, cough, or hemoptysis  Cardiovascular: negative  Gastrointestinal: negative  Genitourinary: as above  Musculoskeletal: negative  Neurologic: negative  Psychiatric: negative  Hematologic/Lymphatic/Immunologic: negative  Endocrine: negative      GENERAL PHYSICAL EXAM:    Vitals: /81   Pulse 72   Ht 1.829 m (6')   Wt 81.6 kg (180 lb)   BMI 24.41 kg/m     Body mass index is 24.41 kg/m .  Constitutional: healthy, alert and no distress  Head: Normocephalic  Neck: Neck supple  Cardiovascular: negative  Respiratory: negative  Gastrointestinal: Abdomen soft, non-tender  Musculoskeletal: extremities normal  Skin: no suspicious lesions or rashes  Neurologic: Gait normal  Psychiatric: affect normal/bright and mentation appears normal.       EXAM:   Left Flank: negative  Right Flank: negative        RECTAL EXAM:            RADIOLOGY: The following tests were reviewed: Need to complete the following Radiology exams prior to the office appointment:  LABS: The last test results for Needs to complete the necessary tests prior to appointment: were reviewed.     ASSESSMENT: 52 y/o M with recent rise in PSA but now at baseline     PLAN:   Repeat PSA in six months   Follow up in six months   Patient will have with PCP and follow up with urology PRN           Again, thank you for allowing me to participate in the care of your patient.      Sincerely,    Марина Glover MD

## 2019-12-06 NOTE — PATIENT INSTRUCTIONS
Please follow up with Urology as needed!    It was a pleasure meeting with you today.  Thank you for allowing me and my team the privilege of caring for you today.  YOU are the reason we are here, and I truly hope we provided you with the excellent service you deserve.  Please let us know if there is anything else we can do for you so that we can be sure you are leaving completely satisfied with your care experience.        Sunday Glaser, EMT

## 2019-12-16 ENCOUNTER — TELEPHONE (OUTPATIENT)
Dept: CARDIOLOGY | Facility: CLINIC | Age: 54
End: 2019-12-16

## 2019-12-18 ENCOUNTER — MYC MEDICAL ADVICE (OUTPATIENT)
Dept: CARDIOLOGY | Facility: CLINIC | Age: 54
End: 2019-12-18

## 2019-12-18 DIAGNOSIS — I71.21 ASCENDING AORTIC ANEURYSM (H): Primary | ICD-10-CM

## 2019-12-19 RX ORDER — ATENOLOL 50 MG/1
50 TABLET ORAL DAILY
Qty: 90 TABLET | Refills: 3 | Status: SHIPPED | OUTPATIENT
Start: 2019-12-19 | End: 2020-12-29

## 2019-12-19 NOTE — TELEPHONE ENCOUNTER
Responded to patient via [employer] with Dr. Arzate's response. Rx for atenolol 50 mg daily escripted to patient's preferred pharmacy.

## 2019-12-19 NOTE — TELEPHONE ENCOUNTER
[employer] message received from patient:    Justin Carias,   I still have symptoms that look like chronic (>6 month) sinusitis. It coincides with the time I switched meds, so I have to check if it is related.   In the last week I've switched back to atenolol and i can definitely say that sinusitis symptoms are significantly reduced.   I'd like to keep experimenting with this change so I will need a new RX for atenolol.   Let me know what you think.   Thanks,   Robson     Last OV 11/1/19 with Dr. Arzate:    Assessment and Plan:      52 year old male with a PMHx of HTN, Bicupsid Aortic Valve with TAA (4.8 cm on MRA) who is here for follow up.     #Thoracic Aortic Aneurysm  - Patient's MRA in 03/2018 showed an aortic size of 4.8 cm. Repeat MRA 4.8 cm.  - Repeat MRA q6-12 months  - Will consider surgical repair at 5 cm (valve sparing aortic repair)  - Coreg 25 mg bid - will see if improves diastolic bp , goal  < 80   - MRA in summer 2020 - follow up after that      #Bicupsid Aortic Valve  -Fusion of RCC and LCC   -No evidence of stenosis of regurgitation on ECHO in 2/2018  -Echo surveillance for valve function     -Will eventually need coronary angiogram  -Recommend screening of children and 1st degree relatives for BAV by ECHO.      #Elevated LDL and carotid plaque < 50% on L side  - Started lipitor 20 mg per day.  - Lipid panel surveillance      #Syncope:  -Vasovagal mediated, event monitor unremarkable for malignant arrhythmias or bradycardia  -Discussed autonomic dysfunction is very common in vascular genetic patients, and behavioral modifications are often necessary, especially when on a beta blocker for aortic protection.      Follow up in 6 months      Aretha Arztae MD MSc  Division of Cardiology  [facility]     Will route to Dr. Arzate for review.

## 2020-03-01 ENCOUNTER — HEALTH MAINTENANCE LETTER (OUTPATIENT)
Age: 55
End: 2020-03-01

## 2020-06-15 ENCOUNTER — HOSPITAL ENCOUNTER (OUTPATIENT)
Dept: MRI IMAGING | Facility: CLINIC | Age: 55
Discharge: HOME | End: 2020-06-15
Attending: INTERNAL MEDICINE | Admitting: INTERNAL MEDICINE
Payer: COMMERCIAL

## 2020-06-15 DIAGNOSIS — I71.21 ASCENDING AORTIC ANEURYSM (H): ICD-10-CM

## 2020-06-15 PROCEDURE — A9585 GADOBUTROL INJECTION: HCPCS | Performed by: INTERNAL MEDICINE

## 2020-06-15 PROCEDURE — 25500064 ZZH RX 255 OP 636: Performed by: INTERNAL MEDICINE

## 2020-06-15 PROCEDURE — 71555 MRI ANGIO CHEST W OR W/O DYE: CPT

## 2020-06-15 RX ORDER — GADOBUTROL 604.72 MG/ML
10 INJECTION INTRAVENOUS ONCE
Status: COMPLETED | OUTPATIENT
Start: 2020-06-15 | End: 2020-06-15

## 2020-06-15 RX ADMIN — GADOBUTROL 10 ML: 604.72 INJECTION INTRAVENOUS at 15:45

## 2020-06-23 ENCOUNTER — MYC MEDICAL ADVICE (OUTPATIENT)
Dept: CARDIOLOGY | Facility: CLINIC | Age: 55
End: 2020-06-23

## 2020-06-23 NOTE — TELEPHONE ENCOUNTER
[employer] message received. RN will send to Dr. Arzate for review. Slight increase in aortic size. Results released to [employer], but will send to Dr. Arzate for formal review and recommendation.     Hi, I've done an MRA on June-15th and I didn't get the results. I wonder if you have access and can get the new results.  Thanks,  Robson      6/15/20 MRI results     Clinical history: 54-year old male with a history of bicuspid aortic valve and ascending aortic aneurysm.  CMR to evaluate the ascending aortic aneurysm.   Comparison MRA: 04/22/2019     1. The ascending thoracic aorta is moderately dilated at 4.8 x 4.6 cm (indexed value 2.35 cm/m2). The  aortic root, transverse arch and descending thoracic aorta are normal in size without an aneurysm or  dissection.     2. The aortic arch is left sided. There is normal branching of the arch vessels. There is no coarctation.     3. The main and proximal branch pulmonary arteries are normal in size.      4. The systemic venous connections are normal.      CONCLUSIONS: The ascending thoracic aorta is moderately dilated at 4.8 x 4.6 cm. There is no change from  the direct re-measurements that were re-obtained from the study 04/22/2019 dated.

## 2020-12-14 ENCOUNTER — HEALTH MAINTENANCE LETTER (OUTPATIENT)
Age: 55
End: 2020-12-14

## 2020-12-27 DIAGNOSIS — I71.21 ASCENDING AORTIC ANEURYSM (H): ICD-10-CM

## 2020-12-29 RX ORDER — ATENOLOL 50 MG/1
50 TABLET ORAL DAILY
Qty: 90 TABLET | Refills: 0 | Status: SHIPPED | OUTPATIENT
Start: 2020-12-29 | End: 2021-04-05

## 2020-12-29 NOTE — TELEPHONE ENCOUNTER
ATENOLOL 50MG TABLETS  Last Written Prescription Date:  12/19/2019  Last Fill Quantity: 90,   # refills: 3  Last Office Visit : 11/1/2019  Future Office visit:  None  90 Tabs, sent to pharm 12/29/2020      Renetta Rodriguez RN  Central Triage Red Flags/Med Refills

## 2021-03-26 DIAGNOSIS — I71.21 ASCENDING AORTIC ANEURYSM (H): ICD-10-CM

## 2021-04-01 DIAGNOSIS — I71.21 ASCENDING AORTIC ANEURYSM (H): Primary | ICD-10-CM

## 2021-04-05 ENCOUNTER — OFFICE VISIT (OUTPATIENT)
Dept: CARDIOLOGY | Facility: CLINIC | Age: 56
End: 2021-04-05
Attending: INTERNAL MEDICINE
Payer: COMMERCIAL

## 2021-04-05 VITALS
WEIGHT: 177 LBS | BODY MASS INDEX: 23.98 KG/M2 | SYSTOLIC BLOOD PRESSURE: 110 MMHG | DIASTOLIC BLOOD PRESSURE: 79 MMHG | HEART RATE: 77 BPM | HEIGHT: 72 IN

## 2021-04-05 DIAGNOSIS — I71.21 ASCENDING AORTIC ANEURYSM (H): ICD-10-CM

## 2021-04-05 PROCEDURE — 99214 OFFICE O/P EST MOD 30 MIN: CPT | Performed by: INTERNAL MEDICINE

## 2021-04-05 RX ORDER — ATENOLOL 50 MG/1
50 TABLET ORAL DAILY
Qty: 90 TABLET | Refills: 3 | Status: SHIPPED | OUTPATIENT
Start: 2021-04-05 | End: 2021-04-05

## 2021-04-05 RX ORDER — ATENOLOL 50 MG/1
50 TABLET ORAL DAILY
Qty: 90 TABLET | Refills: 3 | Status: SHIPPED | OUTPATIENT
Start: 2021-04-05 | End: 2022-06-24

## 2021-04-05 ASSESSMENT — MIFFLIN-ST. JEOR: SCORE: 1665.87

## 2021-04-05 NOTE — PROGRESS NOTES
HPI:     This is a 55 year old male with a PMHx of HTN, Bicupsid Aortic Valve with TAA (4.8 cm on previous MRAs) who is here for follow up. Of note, his mother has an aneurysm of her basilar artery diagnosed. No history of BAV or TAA in the family. He is currently on Atenolol 50mg once a day and SBP range between 130-140s. MRA showing TAA is 4.8 cm and is stable. Had brief syncopal episode prompting an event monitor which demonstrated no sustained arrhythmias, no inappropriate sinus tachycardia, no afib and only an isolated episode of SVT (7 beats). Lowest HR was 46 bpm, no pauses. He had note of an IVCD/BBB but not present on EKG. No palpitations since last visit, feels overall better.     MRA on 09/2018      Clinical history: 53M with BAV and ascending aortic aneurysm by echo presents for elective outpatient MRA  to evaluate aortic dimension.  Comparison MRA: None     1. Mild dilation of the ascending aorta, 4.4cm in its maximum biorthogonal dimension.     2. The remainder of the aortic root, transverse arch and descending thoracic aorta are normal in size  without an aneurysm or dissection.     2. The aortic arch is left sided. There is normal branching of the arch vessels. There is no coarctation.     3. The main and proximal branch pulmonary arteries are normal in size.      4. The pulmonary venous connections are normal.     CONCLUSIONS: Mild dilation of the ascending aorta measuring 4.4 cm.     MRA 4/2019     1. The ascending aorta is dilated with maximal diameter of 4.8 cm. The aortic root, transverse  arch and descending thoracic aorta are normal in size without aneurysm or dissection.     2. The aortic arch is left sided. There is normal branching of the arch vessels. There is no coarctation.     3. The main and proximal branch pulmonary arteries are normal in size.      4. The systemic venous connections are normal.      5. There is pectus excavatum.            ECHOCARDIOGRAM  2/22/2018  Interpretation Summary  The aortic valve is bicuspid. The aorta is dilated and measures 47 mm at the  level of the proximal ascending segment (Z-score +4.25.) The aortic root,  indexed to BSA is 23.41 mm/m2 (normal, men: 15+/-2 mm/m2; normal, women: 16+/-  3 mm/m2.)  Left ventricular size is normal. The Ejection Fraction is estimated at 55-60%.  No regional wall motion abnormalities are seen.  The right ventricle is normal size. Global right ventricular function is  normal.  The inferior vena cava was normal in size with preserved respiratory  variability. Estimated mean right atrial pressure is 3 mmHg.  No pericardial effusion is present.  Previous study not available for comparison.  Consider CT or MRA for definitive sizing of the aorta and referral to  cardiology.  ____________________________________________________________________________        Assessment and Plan:      55 year old male with a PMHx of HTN, Bicupsid Aortic Valve with TAA (4.8 cm on MRA) who is here for follow up.     #Thoracic Aortic Aneurysm  - Patient's MRA in 03/2018 showed an aortic size of 4.8 cm. Repeat MRA 4.8 cm.  - Repeat every year   - Will consider surgical repair at 4.5-5 cm (valve sparing aortic repair given normal functioning valve)     #Bicupsid Aortic Valve  -Fusion of RCC and LCC   -No evidence of stenosis of regurgitation on ECHO in 2/2018  -Echo surveillance for valve function     -Will eventually need coronary angiogram   -Recommend screening of children and 1st degree relatives for BAV by ECHO.      #Elevated LDL and carotid plaque < 50% on L side  - Started lipitor 20 mg per day.  - Lipid panel surveillance per PCP     #Syncope:  -Vasovagal mediated, event monitor unremarkable for malignant arrhythmias or bradycardia  -Discussed autonomic dysfunction is very common in vascular genetic patients, and behavioral modifications are often necessary, especially when on a beta blocker for aortic protection.      Follow  up in 1 year with me     Aretha Arzate MD MSc  Division of Cardiology       PAST MEDICAL HISTORY  Past Medical History:   Diagnosis Date     Aortic aneurysm (H)      Ascending aortic aneurysm (H) 3/1/2018    4.7 cm on echo       CURRENT MEDICATIONS  Current Outpatient Medications   Medication Sig Dispense Refill     atenolol (TENORMIN) 50 MG tablet Take 1 tablet (50 mg) by mouth daily 90 tablet 0     atorvastatin (LIPITOR) 20 MG tablet Take 1 tablet (20 mg) by mouth daily (Patient not taking: Reported on 4/5/2021) 90 tablet 3     clobetasol (TEMOVATE) 0.05 % cream Apply 1 Application topically 2 times daily       losartan (COZAAR) 25 MG tablet Take 1 tablet (25 mg) by mouth daily (Patient not taking: Reported on 4/5/2021) 90 tablet 3       PAST SURGICAL HISTORY:  No past surgical history on file.    ALLERGIES   No Known Allergies    FAMILY HISTORY  No family history on file.        SOCIAL HISTORY  Social History     Socioeconomic History     Marital status:      Spouse name: Not on file     Number of children: Not on file     Years of education: Not on file     Highest education level: Not on file   Occupational History     Not on file   Social Needs     Financial resource strain: Not on file     Food insecurity     Worry: Not on file     Inability: Not on file     Transportation needs     Medical: Not on file     Non-medical: Not on file   Tobacco Use     Smoking status: Never Smoker     Smokeless tobacco: Never Used   Substance and Sexual Activity     Alcohol use: Yes     Comment: ocassional      Drug use: No     Sexual activity: Not on file   Lifestyle     Physical activity     Days per week: Not on file     Minutes per session: Not on file     Stress: Not on file   Relationships     Social connections     Talks on phone: Not on file     Gets together: Not on file     Attends Rastafari service: Not on file     Active member of club or organization: Not on file     Attends meetings of clubs or  organizations: Not on file     Relationship status: Not on file     Intimate partner violence     Fear of current or ex partner: Not on file     Emotionally abused: Not on file     Physically abused: Not on file     Forced sexual activity: Not on file   Other Topics Concern     Parent/sibling w/ CABG, MI or angioplasty before 65F 55M? Not Asked   Social History Narrative     Not on file       ROS:   Constitutional: No fever, chills, or sweats. No weight gain/loss   ENT: No visual disturbance, ear ache, epistaxis, sore throat  Allergies/Immunologic: Negative  Respiratory: No cough, hemoptysia  Cardiovascular: As per HPI  GI: No nausea, vomiting, hematemesis, melena, or hematochezia  : No urinary frequency, dysuria, or hematuria  Integument: Negative  Psychiatric: Negative  Neuro: Negative  Endocrinology: Negative   Musculoskeletal: Negative  Vascular: No walking impairment, claudication, ischemic rest pain or nonhealing wounds    EXAM:  /79   Pulse 77   Ht 1.829 m (6')   Wt 80.3 kg (177 lb)   BMI 24.01 kg/m    In general, the patient is a pleasant male in no apparent distress.    HEENT: NC/AT.  PERRLA.  EOMI.  Sclerae white, not injected.  Nares clear.  Pharynx without erythema or exudate.  Dentition intact.    Neck: No adenopathy.  No thyromegaly. Carotids +2/2 bilaterally without bruits.  No jugular venous distension.   Heart: RRR. Normal S1, S2 splits physiologically. No murmur, rub, click, or gallop. The PMI is in the 5th ICS in the midclavicular line. There is no heave.    Lungs: CTA.  No ronchi, wheezes, rales.  No dullness to percussion.   Abdomen: Soft, nontender, nondistended. No organomegaly. No AAA.  No bruits.   Extremities: No clubbing, cyanosis, or edema.  No wounds. No varicose veins signs of chronic venous insufficiency.   Vascular: No bruits are noted.    Labs:  LIPID RESULTS:  Lab Results   Component Value Date    CHOL 162 06/11/2019    HDL 58 06/11/2019    LDL 76 06/11/2019    TRIG 138  06/11/2019    NHDL 104 06/11/2019       LIVER ENZYME RESULTS:  Lab Results   Component Value Date    AST 17 05/24/2019    ALT 39 05/24/2019       CBC RESULTS:  Lab Results   Component Value Date    WBC 8.0 05/03/2019    RBC 4.84 05/03/2019    HGB 14.2 05/03/2019    HCT 45.1 05/03/2019    MCV 93 05/03/2019    MCH 29.3 05/03/2019    MCHC 31.5 05/03/2019    RDW 12.3 05/03/2019     05/03/2019       BMP RESULTS:  Lab Results   Component Value Date     05/24/2019    POTASSIUM 4.4 05/24/2019    CHLORIDE 105 05/24/2019    CO2 28 05/24/2019    ANIONGAP 5 05/24/2019    GLC 91 05/24/2019    BUN 20 05/24/2019    CR 0.88 05/24/2019    GFRESTIMATED >90 05/24/2019    GFRESTBLACK >90 05/24/2019    HALEY 9.5 05/24/2019        A1C RESULTS:  No results found for: A1C

## 2021-04-05 NOTE — LETTER
4/5/2021    ANGEL MORROW MD PhD  [address] [address]    RE: Robson PIMENTEL Brenda       Dear Colleague,    I had the pleasure of seeing Robson ANNE MI Brianparrishananth in the [facility].      HPI:     This is a 55 year old male with a PMHx of HTN, Bicupsid Aortic Valve with TAA (4.8 cm on previous MRAs) who is here for follow up. Of note, his mother has an aneurysm of her basilar artery diagnosed. No history of BAV or TAA in the family. He is currently on Atenolol 50mg once a day and SBP range between 130-140s. MRA showing TAA is 4.8 cm and is stable. Had brief syncopal episode prompting an event monitor which demonstrated no sustained arrhythmias, no inappropriate sinus tachycardia, no afib and only an isolated episode of SVT (7 beats). Lowest HR was 46 bpm, no pauses. He had note of an IVCD/BBB but not present on EKG. No palpitations since last visit, feels overall better.     MRA on 09/2018      Clinical history: 53M with BAV and ascending aortic aneurysm by echo presents for elective outpatient MRA  to evaluate aortic dimension.  Comparison MRA: None     1. Mild dilation of the ascending aorta, 4.4cm in its maximum biorthogonal dimension.     2. The remainder of the aortic root, transverse arch and descending thoracic aorta are normal in size  without an aneurysm or dissection.     2. The aortic arch is left sided. There is normal branching of the arch vessels. There is no coarctation.     3. The main and proximal branch pulmonary arteries are normal in size.      4. The pulmonary venous connections are normal.     CONCLUSIONS: Mild dilation of the ascending aorta measuring 4.4 cm.     MRA 4/2019     1. The ascending aorta is dilated with maximal diameter of 4.8 cm. The aortic root, transverse  arch and descending thoracic aorta are normal in size without aneurysm or dissection.     2. The aortic arch is left sided.  There is normal branching of the arch vessels. There is no coarctation.     3. The main and proximal branch pulmonary arteries are normal in size.      4. The systemic venous connections are normal.      5. There is pectus excavatum.            ECHOCARDIOGRAM 2/22/2018  Interpretation Summary  The aortic valve is bicuspid. The aorta is dilated and measures 47 mm at the  level of the proximal ascending segment (Z-score +4.25.) The aortic root,  indexed to BSA is 23.41 mm/m2 (normal, men: 15+/-2 mm/m2; normal, women: 16+/-  3 mm/m2.)  Left ventricular size is normal. The Ejection Fraction is estimated at 55-60%.  No regional wall motion abnormalities are seen.  The right ventricle is normal size. Global right ventricular function is  normal.  The inferior vena cava was normal in size with preserved respiratory  variability. Estimated mean right atrial pressure is 3 mmHg.  No pericardial effusion is present.  Previous study not available for comparison.  Consider CT or MRA for definitive sizing of the aorta and referral to  cardiology.  ____________________________________________________________________________        Assessment and Plan:      55 year old male with a PMHx of HTN, Bicupsid Aortic Valve with TAA (4.8 cm on MRA) who is here for follow up.     #Thoracic Aortic Aneurysm  - Patient's MRA in 03/2018 showed an aortic size of 4.8 cm. Repeat MRA 4.8 cm.  - Repeat every year   - Will consider surgical repair at 4.5-5 cm (valve sparing aortic repair given normal functioning valve)     #Bicupsid Aortic Valve  -Fusion of RCC and LCC   -No evidence of stenosis of regurgitation on ECHO in 2/2018  -Echo surveillance for valve function     -Will eventually need coronary angiogram   -Recommend screening of children and 1st degree relatives for BAV by ECHO.      #Elevated LDL and carotid plaque < 50% on L side  - Started lipitor 20 mg per day.  - Lipid panel surveillance per PCP     #Syncope:  -Vasovagal mediated, event  monitor unremarkable for malignant arrhythmias or bradycardia  -Discussed autonomic dysfunction is very common in vascular genetic patients, and behavioral modifications are often necessary, especially when on a beta blocker for aortic protection.      Follow up in 1 year with me     Aretha Arzate MD MSc  Division of Cardiology       PAST MEDICAL HISTORY  Past Medical History:   Diagnosis Date     Aortic aneurysm (H)      Ascending aortic aneurysm (H) 3/1/2018    4.7 cm on echo       CURRENT MEDICATIONS  Current Outpatient Medications   Medication Sig Dispense Refill     atenolol (TENORMIN) 50 MG tablet Take 1 tablet (50 mg) by mouth daily 90 tablet 0     atorvastatin (LIPITOR) 20 MG tablet Take 1 tablet (20 mg) by mouth daily (Patient not taking: Reported on 4/5/2021) 90 tablet 3     clobetasol (TEMOVATE) 0.05 % cream Apply 1 Application topically 2 times daily       losartan (COZAAR) 25 MG tablet Take 1 tablet (25 mg) by mouth daily (Patient not taking: Reported on 4/5/2021) 90 tablet 3       PAST SURGICAL HISTORY:  No past surgical history on file.    ALLERGIES   No Known Allergies    FAMILY HISTORY  No family history on file.        SOCIAL HISTORY  Social History     Socioeconomic History     Marital status:      Spouse name: Not on file     Number of children: Not on file     Years of education: Not on file     Highest education level: Not on file   Occupational History     Not on file   Social Needs     Financial resource strain: Not on file     Food insecurity     Worry: Not on file     Inability: Not on file     Transportation needs     Medical: Not on file     Non-medical: Not on file   Tobacco Use     Smoking status: Never Smoker     Smokeless tobacco: Never Used   Substance and Sexual Activity     Alcohol use: Yes     Comment: ocassional      Drug use: No     Sexual activity: Not on file   Lifestyle     Physical activity     Days per week: Not on file     Minutes per session: Not on file      Stress: Not on file   Relationships     Social connections     Talks on phone: Not on file     Gets together: Not on file     Attends Jewish service: Not on file     Active member of club or organization: Not on file     Attends meetings of clubs or organizations: Not on file     Relationship status: Not on file     Intimate partner violence     Fear of current or ex partner: Not on file     Emotionally abused: Not on file     Physically abused: Not on file     Forced sexual activity: Not on file   Other Topics Concern     Parent/sibling w/ CABG, MI or angioplasty before 65F 55M? Not Asked   Social History Narrative     Not on file       ROS:   Constitutional: No fever, chills, or sweats. No weight gain/loss   ENT: No visual disturbance, ear ache, epistaxis, sore throat  Allergies/Immunologic: Negative  Respiratory: No cough, hemoptysia  Cardiovascular: As per HPI  GI: No nausea, vomiting, hematemesis, melena, or hematochezia  : No urinary frequency, dysuria, or hematuria  Integument: Negative  Psychiatric: Negative  Neuro: Negative  Endocrinology: Negative   Musculoskeletal: Negative  Vascular: No walking impairment, claudication, ischemic rest pain or nonhealing wounds    EXAM:  /79   Pulse 77   Ht 1.829 m (6')   Wt 80.3 kg (177 lb)   BMI 24.01 kg/m    In general, the patient is a pleasant male in no apparent distress.    HEENT: NC/AT.  PERRLA.  EOMI.  Sclerae white, not injected.  Nares clear.  Pharynx without erythema or exudate.  Dentition intact.    Neck: No adenopathy.  No thyromegaly. Carotids +2/2 bilaterally without bruits.  No jugular venous distension.   Heart: RRR. Normal S1, S2 splits physiologically. No murmur, rub, click, or gallop. The PMI is in the 5th ICS in the midclavicular line. There is no heave.    Lungs: CTA.  No ronchi, wheezes, rales.  No dullness to percussion.   Abdomen: Soft, nontender, nondistended. No organomegaly. No AAA.  No bruits.   Extremities: No clubbing,  cyanosis, or edema.  No wounds. No varicose veins signs of chronic venous insufficiency.   Vascular: No bruits are noted.    Labs:  LIPID RESULTS:  Lab Results   Component Value Date    CHOL 162 06/11/2019    HDL 58 06/11/2019    LDL 76 06/11/2019    TRIG 138 06/11/2019    NHDL 104 06/11/2019       LIVER ENZYME RESULTS:  Lab Results   Component Value Date    AST 17 05/24/2019    ALT 39 05/24/2019       CBC RESULTS:  Lab Results   Component Value Date    WBC 8.0 05/03/2019    RBC 4.84 05/03/2019    HGB 14.2 05/03/2019    HCT 45.1 05/03/2019    MCV 93 05/03/2019    MCH 29.3 05/03/2019    MCHC 31.5 05/03/2019    RDW 12.3 05/03/2019     05/03/2019       BMP RESULTS:  Lab Results   Component Value Date     05/24/2019    POTASSIUM 4.4 05/24/2019    CHLORIDE 105 05/24/2019    CO2 28 05/24/2019    ANIONGAP 5 05/24/2019    GLC 91 05/24/2019    BUN 20 05/24/2019    CR 0.88 05/24/2019    GFRESTIMATED >90 05/24/2019    GFRESTBLACK >90 05/24/2019    HALEY 9.5 05/24/2019        A1C RESULTS:  No results found for: A1C    Thank you for allowing me to participate in the care of your patient.      Sincerely,     Aretha Arazte MD     [facility]      cc:   Aretha Arzate MD  [address] [place] [id number]

## 2021-04-07 RX ORDER — ATENOLOL 50 MG/1
TABLET ORAL
Qty: 90 TABLET | Refills: 0 | OUTPATIENT
Start: 2021-04-07

## 2021-04-17 ENCOUNTER — HEALTH MAINTENANCE LETTER (OUTPATIENT)
Age: 56
End: 2021-04-17

## 2021-04-27 ENCOUNTER — HOSPITAL ENCOUNTER (OUTPATIENT)
Dept: CARDIOLOGY | Facility: CLINIC | Age: 56
Discharge: HOME | End: 2021-04-27
Attending: INTERNAL MEDICINE | Admitting: INTERNAL MEDICINE
Payer: COMMERCIAL

## 2021-04-27 DIAGNOSIS — I71.21 ASCENDING AORTIC ANEURYSM (H): ICD-10-CM

## 2021-04-27 PROCEDURE — 71555 MRI ANGIO CHEST W OR W/O DYE: CPT | Mod: 26 | Performed by: INTERNAL MEDICINE

## 2021-04-27 PROCEDURE — A9585 GADOBUTROL INJECTION: HCPCS | Performed by: INTERNAL MEDICINE

## 2021-04-27 PROCEDURE — 93306 TTE W/DOPPLER COMPLETE: CPT | Mod: 26 | Performed by: INTERNAL MEDICINE

## 2021-04-27 PROCEDURE — 255N000002 HC RX 255 OP 636: Performed by: INTERNAL MEDICINE

## 2021-04-27 PROCEDURE — 999N000208 ECHOCARDIOGRAM COMPLETE

## 2021-04-27 PROCEDURE — 71555 MRI ANGIO CHEST W OR W/O DYE: CPT

## 2021-04-27 RX ORDER — REGADENOSON 0.08 MG/ML
0.4 INJECTION, SOLUTION INTRAVENOUS ONCE
Status: DISCONTINUED | OUTPATIENT
Start: 2021-04-27 | End: 2021-04-28 | Stop reason: HOSPADM

## 2021-04-27 RX ORDER — ONDANSETRON 2 MG/ML
4 INJECTION INTRAMUSCULAR; INTRAVENOUS
Status: DISCONTINUED | OUTPATIENT
Start: 2021-04-27 | End: 2021-04-28 | Stop reason: HOSPADM

## 2021-04-27 RX ORDER — DIPHENHYDRAMINE HYDROCHLORIDE 50 MG/ML
25-50 INJECTION INTRAMUSCULAR; INTRAVENOUS
Status: DISCONTINUED | OUTPATIENT
Start: 2021-04-27 | End: 2021-04-28 | Stop reason: HOSPADM

## 2021-04-27 RX ORDER — AMINOPHYLLINE 25 MG/ML
100 INJECTION, SOLUTION INTRAVENOUS ONCE
Status: DISCONTINUED | OUTPATIENT
Start: 2021-04-27 | End: 2021-04-28 | Stop reason: HOSPADM

## 2021-04-27 RX ORDER — DIAZEPAM 5 MG
5 TABLET ORAL EVERY 30 MIN PRN
Status: DISCONTINUED | OUTPATIENT
Start: 2021-04-27 | End: 2021-04-28 | Stop reason: HOSPADM

## 2021-04-27 RX ORDER — GADOBUTROL 604.72 MG/ML
16 INJECTION INTRAVENOUS ONCE
Status: COMPLETED | OUTPATIENT
Start: 2021-04-27 | End: 2021-04-27

## 2021-04-27 RX ORDER — CAFFEINE CITRATE 20 MG/ML
60 SOLUTION INTRAVENOUS
Status: DISCONTINUED | OUTPATIENT
Start: 2021-04-27 | End: 2021-04-28 | Stop reason: HOSPADM

## 2021-04-27 RX ORDER — METHYLPREDNISOLONE SODIUM SUCCINATE 125 MG/2ML
125 INJECTION, POWDER, LYOPHILIZED, FOR SOLUTION INTRAMUSCULAR; INTRAVENOUS
Status: DISCONTINUED | OUTPATIENT
Start: 2021-04-27 | End: 2021-04-28 | Stop reason: HOSPADM

## 2021-04-27 RX ORDER — ALBUTEROL SULFATE 90 UG/1
2 AEROSOL, METERED RESPIRATORY (INHALATION) EVERY 5 MIN PRN
Status: DISCONTINUED | OUTPATIENT
Start: 2021-04-27 | End: 2021-04-28 | Stop reason: HOSPADM

## 2021-04-27 RX ORDER — DIPHENHYDRAMINE HCL 25 MG
25 CAPSULE ORAL
Status: DISCONTINUED | OUTPATIENT
Start: 2021-04-27 | End: 2021-04-28 | Stop reason: HOSPADM

## 2021-04-27 RX ORDER — ACYCLOVIR 200 MG/1
0-1 CAPSULE ORAL
Status: DISCONTINUED | OUTPATIENT
Start: 2021-04-27 | End: 2021-04-28 | Stop reason: HOSPADM

## 2021-04-27 RX ADMIN — GADOBUTROL 16 ML: 604.72 INJECTION INTRAVENOUS at 16:21

## 2021-04-27 RX ADMIN — HUMAN ALBUMIN MICROSPHERES AND PERFLUTREN 9 ML: 10; .22 INJECTION, SOLUTION INTRAVENOUS at 13:46

## 2021-04-28 ENCOUNTER — CARE COORDINATION (OUTPATIENT)
Dept: CARDIOLOGY | Facility: CLINIC | Age: 56
End: 2021-04-28

## 2021-04-28 NOTE — PROGRESS NOTES
MRA Chest 4/27/21:   Aneurysm of the ascending aorta, measures 4.8 x4.7cm, at the level of the origin of the right pulmoanry  artery.   The aortic root,  mid transverse arch and descending thoracic aorta are normal in size without an aneurysm  or dissection.  No significnat change compared to prior study from 6/20.       Echo 4/27/21:  1. Left ventricular systolic function is normal. The visual ejection fraction  is estimated at 60-65%.  2. The right ventricle is normal in size and function.  3. The aortic valve is bicuspid. There is fusion of the left and right  coronary cusps. Valve is mild to moderately calcified but not stenotic. Trace  aortic regurgitation.  4. Dilation of the ascending aorta. Maximum dimension 4.8cm. Aortic root at  the sinuses of Valsalva 3.7cm.     In comparison to the echo from 2/22/18, the aortic root at the sinuses was  3.6cm, ascending aorta 4.7cm.       Last OV 4/5/21 w/ Dr. Arzate:  Assessment and Plan:      55 year old male with a PMHx of HTN, Bicupsid Aortic Valve with TAA (4.8 cm on MRA) who is here for follow up.     #Thoracic Aortic Aneurysm  - Patient's MRA in 03/2018 showed an aortic size of 4.8 cm. Repeat MRA 4.8 cm.  - Repeat every year   - Will consider surgical repair at 4.5-5 cm (valve sparing aortic repair given normal functioning valve)     #Bicupsid Aortic Valve  -Fusion of RCC and LCC   -No evidence of stenosis of regurgitation on ECHO in 2/2018  -Echo surveillance for valve function     -Will eventually need coronary angiogram   -Recommend screening of children and 1st degree relatives for BAV by ECHO.      #Elevated LDL and carotid plaque < 50% on L side  - Started lipitor 20 mg per day.  - Lipid panel surveillance per PCP     #Syncope:  -Vasovagal mediated, event monitor unremarkable for malignant arrhythmias or bradycardia  -Discussed autonomic dysfunction is very common in vascular genetic patients, and behavioral modifications are often necessary, especially when  on a beta blocker for aortic protection.      Follow up in 1 year with gabriel Escobedo RN April 28, 2021 3:05 PM

## 2021-04-29 NOTE — PROGRESS NOTES
Aretha Arzate MD Cowling, Elizabeth, RN 1 minute ago (3:05 PM)     No significant change. You Can release these to patients when report reads no significant change or change is within 0.1 cm.     Dr. Arzate     Message text      Results released to patient's MyChart.

## 2021-10-02 ENCOUNTER — HEALTH MAINTENANCE LETTER (OUTPATIENT)
Age: 56
End: 2021-10-02

## 2022-01-07 ENCOUNTER — TRANSFERRED RECORDS (OUTPATIENT)
Dept: HEALTH INFORMATION MANAGEMENT | Facility: CLINIC | Age: 57
End: 2022-01-07

## 2022-04-05 ENCOUNTER — MYC MEDICAL ADVICE (OUTPATIENT)
Dept: CARDIOLOGY | Facility: CLINIC | Age: 57
End: 2022-04-05
Payer: COMMERCIAL

## 2022-04-05 DIAGNOSIS — I71.21 ASCENDING AORTIC ANEURYSM (H): Primary | ICD-10-CM

## 2022-04-07 NOTE — TELEPHONE ENCOUNTER
MRA orders placed. RN updated patient via [employer].       Dr. Arzate's recommendations    Yes that would be great!     Thanks,   Dr. Arzate

## 2022-05-14 ENCOUNTER — HEALTH MAINTENANCE LETTER (OUTPATIENT)
Age: 57
End: 2022-05-14

## 2022-06-24 DIAGNOSIS — I71.21 ASCENDING AORTIC ANEURYSM (H): ICD-10-CM

## 2022-06-24 RX ORDER — ATENOLOL 50 MG/1
50 TABLET ORAL DAILY
Qty: 90 TABLET | Refills: 0 | Status: SHIPPED | OUTPATIENT
Start: 2022-06-24 | End: 2022-09-07

## 2022-06-24 RX ORDER — ATENOLOL 50 MG/1
TABLET ORAL
Qty: 90 TABLET | Refills: 3 | OUTPATIENT
Start: 2022-06-24

## 2022-07-12 ENCOUNTER — HOSPITAL ENCOUNTER (OUTPATIENT)
Dept: CARDIOLOGY | Facility: CLINIC | Age: 57
Discharge: HOME | End: 2022-07-12
Attending: INTERNAL MEDICINE | Admitting: INTERNAL MEDICINE
Payer: COMMERCIAL

## 2022-07-12 DIAGNOSIS — I71.21 ASCENDING AORTIC ANEURYSM (H): ICD-10-CM

## 2022-07-12 PROCEDURE — 71555 MRI ANGIO CHEST W OR W/O DYE: CPT

## 2022-07-12 PROCEDURE — A9585 GADOBUTROL INJECTION: HCPCS | Performed by: INTERNAL MEDICINE

## 2022-07-12 PROCEDURE — 255N000002 HC RX 255 OP 636: Performed by: INTERNAL MEDICINE

## 2022-07-12 PROCEDURE — 71555 MRI ANGIO CHEST W OR W/O DYE: CPT | Mod: 26 | Performed by: INTERNAL MEDICINE

## 2022-07-12 RX ORDER — DIAZEPAM 5 MG
5 TABLET ORAL EVERY 30 MIN PRN
Status: DISCONTINUED | OUTPATIENT
Start: 2022-07-12 | End: 2022-07-13 | Stop reason: HOSPADM

## 2022-07-12 RX ORDER — DIPHENHYDRAMINE HYDROCHLORIDE 50 MG/ML
25-50 INJECTION INTRAMUSCULAR; INTRAVENOUS
Status: DISCONTINUED | OUTPATIENT
Start: 2022-07-12 | End: 2022-07-13 | Stop reason: HOSPADM

## 2022-07-12 RX ORDER — ACYCLOVIR 200 MG/1
0-1 CAPSULE ORAL
Status: DISCONTINUED | OUTPATIENT
Start: 2022-07-12 | End: 2022-07-13 | Stop reason: HOSPADM

## 2022-07-12 RX ORDER — ONDANSETRON 2 MG/ML
4 INJECTION INTRAMUSCULAR; INTRAVENOUS
Status: DISCONTINUED | OUTPATIENT
Start: 2022-07-12 | End: 2022-07-13 | Stop reason: HOSPADM

## 2022-07-12 RX ORDER — GADOBUTROL 604.72 MG/ML
15 INJECTION INTRAVENOUS ONCE
Status: COMPLETED | OUTPATIENT
Start: 2022-07-12 | End: 2022-07-12

## 2022-07-12 RX ORDER — METHYLPREDNISOLONE SODIUM SUCCINATE 125 MG/2ML
125 INJECTION, POWDER, LYOPHILIZED, FOR SOLUTION INTRAMUSCULAR; INTRAVENOUS
Status: DISCONTINUED | OUTPATIENT
Start: 2022-07-12 | End: 2022-07-13 | Stop reason: HOSPADM

## 2022-07-12 RX ORDER — DIPHENHYDRAMINE HCL 25 MG
25 CAPSULE ORAL
Status: DISCONTINUED | OUTPATIENT
Start: 2022-07-12 | End: 2022-07-13 | Stop reason: HOSPADM

## 2022-07-12 RX ADMIN — GADOBUTROL 15 ML: 604.72 INJECTION INTRAVENOUS at 10:15

## 2022-08-01 ENCOUNTER — OFFICE VISIT (OUTPATIENT)
Dept: CARDIOLOGY | Facility: CLINIC | Age: 57
End: 2022-08-01
Attending: INTERNAL MEDICINE
Payer: COMMERCIAL

## 2022-08-01 VITALS
SYSTOLIC BLOOD PRESSURE: 126 MMHG | HEART RATE: 74 BPM | DIASTOLIC BLOOD PRESSURE: 91 MMHG | BODY MASS INDEX: 24.92 KG/M2 | WEIGHT: 178 LBS | HEIGHT: 71 IN | OXYGEN SATURATION: 99 %

## 2022-08-01 DIAGNOSIS — I71.21 ASCENDING AORTIC ANEURYSM (H): ICD-10-CM

## 2022-08-01 PROCEDURE — 99214 OFFICE O/P EST MOD 30 MIN: CPT | Performed by: INTERNAL MEDICINE

## 2022-08-01 NOTE — PROGRESS NOTES
Vascular Cardiology Consultation      HPI:     This is a 57 year old male with a PMHx of HTN, Bicupsid Aortic Valve with TAA (4.8 cm on previous MRAs) who is here for follow up. Of note, his mother has an aneurysm of her basilar artery diagnosed. No history of BAV or TAA in the family. He is currently on Atenolol and losartan and BP well controlled although diastolic sometimes elevated. MRA showing TAA is 4.8 cm and is stable. Had brief syncopal episode prompting an event monitor which demonstrated no sustained arrhythmias, no inappropriate sinus tachycardia, no afib and only an isolated episode of SVT (7 beats). Lowest HR was 46 bpm, no pauses. He had note of an IVCD/BBB but not present on EKG. No recurrent episodes.     His MRA today reviewed and is stable in size once again, at 4.8 cm. His last echocardiogram was in 2021 which showed no valvular dysfunction. [facility] fushion.     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         Assessment and Plan:      57 year old male with a PMHx of HTN, Bicupsid Aortic Valve with TAA (4.8 cm on MRA) who is here for follow up.     #Thoracic Aortic Aneurysm  - Patient's MRA in 2022 showed an aortic size of 4.8 cm. Repeat MRA 4.8 cm.  - Repeat every year   - Will consider surgical repair at 4.5-5 cm (valve sparing aortic repair given normal functioning valve) - patient OK with surveillance plan at this time      #Bicupsid Aortic Valve  -Fusion of [facility]   -No evidence of stenosis of regurgitation on ECHO  -Echo surveillance for valve function q2 years  -Will eventually need coronary angiogram   -Children have completed screening, no BAV     #Elevated LDL and carotid plaque < 50% on L side  - Started lipitor 20 mg per day.  - Lipid panel surveillance per PCP     #Syncope:  -Vasovagal mediated, event monitor unremarkable for malignant arrhythmias or bradycardia  -Discussed autonomic dysfunction is very common in vascular  genetic patients, and behavioral modifications are often necessary, especially when on a beta blocker for aortic protection.      Follow up in 1 year with me     Aretha Arzate MD MSc  Division of Cardiology      PAST MEDICAL HISTORY  Past Medical History:   Diagnosis Date     Aortic aneurysm (H)      Ascending aortic aneurysm (H) 3/1/2018    4.7 cm on echo       CURRENT MEDICATIONS  Current Outpatient Medications   Medication Sig Dispense Refill     atenolol (TENORMIN) 50 MG tablet Take 1 tablet (50 mg) by mouth daily 90 tablet 0     clobetasol (TEMOVATE) 0.05 % cream Apply 1 Application topically 2 times daily       atorvastatin (LIPITOR) 20 MG tablet Take 1 tablet (20 mg) by mouth daily (Patient not taking: No sig reported) 90 tablet 3     losartan (COZAAR) 25 MG tablet Take 1 tablet (25 mg) by mouth daily (Patient not taking: Reported on 8/1/2022) 90 tablet 3       PAST SURGICAL HISTORY:  History reviewed. No pertinent surgical history.    ALLERGIES   No Known Allergies    FAMILY HISTORY  History reviewed. No pertinent family history.    SOCIAL HISTORY  Social History     Socioeconomic History     Marital status:      Spouse name: Not on file     Number of children: Not on file     Years of education: Not on file     Highest education level: Not on file   Occupational History     Not on file   Tobacco Use     Smoking status: Never Smoker     Smokeless tobacco: Never Used   Substance and Sexual Activity     Alcohol use: Yes     Comment: ocassional      Drug use: No     Sexual activity: Not on file   Other Topics Concern     Parent/sibling w/ CABG, MI or angioplasty before 65F 55M? Not Asked   Social History Narrative     Not on file     Social Determinants of Health     Financial Resource Strain: Not on file   Food Insecurity: Not on file   Transportation Needs: Not on file   Physical Activity: Not on file   Stress: Not on file   Social Connections: Not on file   Intimate Partner Violence: Not on file  "  Housing Stability: Not on file       ROS:   Constitutional: No fever, chills, or sweats. No weight gain/loss   ENT: No visual disturbance, ear ache, epistaxis, sore throat  Allergies/Immunologic: Negative  Respiratory: No cough, hemoptysia  Cardiovascular: As per HPI  GI: No nausea, vomiting, hematemesis, melena, or hematochezia  : No urinary frequency, dysuria, or hematuria  Integument: Negative  Psychiatric: Negative  Neuro: Negative  Endocrinology: Negative   Musculoskeletal: Negative  Vascular: No walking impairment, claudication, ischemic rest pain or nonhealing wounds    EXAM:  BP (!) 126/91   Pulse 74   Ht 1.803 m (5' 11\")   Wt 80.7 kg (178 lb)   SpO2 99%   BMI 24.83 kg/m    In general, the patient is a pleasant male in no apparent distress.    HEENT: NC/AT.  PERRLA.  EOMI.  Sclerae white, not injected.  Nares clear.  Pharynx without erythema or exudate.  Dentition intact.    Neck: No adenopathy.  No thyromegaly. Carotids +2/2 bilaterally without bruits.  No jugular venous distension.   Heart: RRR. Normal S1, S2 splits physiologically. No murmur, rub, click, or gallop. The PMI is in the 5th ICS in the midclavicular line. There is no heave.    Lungs: CTA.  No ronchi, wheezes, rales.  No dullness to percussion.   Abdomen: Soft, nontender, nondistended. No organomegaly. No AAA.  No bruits.   Extremities: No clubbing, cyanosis, or edema.  No wounds. No varicose veins signs of chronic venous insufficiency.   Vascular: No bruits are noted.    Labs:  LIPID RESULTS:  Lab Results   Component Value Date    CHOL 162 06/11/2019    HDL 58 06/11/2019    LDL 76 06/11/2019    TRIG 166 (H) 01/07/2022    TRIG 138 06/11/2019    NHDL 104 06/11/2019       LIVER ENZYME RESULTS:  Lab Results   Component Value Date    AST 17 05/24/2019    ALT 39 05/24/2019       CBC RESULTS:  Lab Results   Component Value Date    WBC 8.0 05/03/2019    RBC 4.84 05/03/2019    HGB 14.2 05/03/2019    HCT 45.1 05/03/2019    MCV 93 05/03/2019    " MCH 29.3 05/03/2019    MCHC 31.5 05/03/2019    RDW 12.3 05/03/2019     05/03/2019       BMP RESULTS:  Lab Results   Component Value Date     05/24/2019    POTASSIUM 4.4 05/24/2019    CHLORIDE 103 01/07/2022    CHLORIDE 105 05/24/2019    CO2 28 05/24/2019    ANIONGAP 5 05/24/2019    GLC 91 05/24/2019    BUN 20 05/24/2019    CR 0.88 05/24/2019    GFRESTIMATED >90 05/24/2019    GFRESTBLACK >90 05/24/2019    HALEY 9.5 05/24/2019        A1C RESULTS:  No results found for: A1C

## 2022-08-01 NOTE — LETTER
8/1/2022    [address] [address]    RE: Robson Brenda       Dear Colleague,     I had the pleasure of seeing Robson Torresjason in the [facility].           Vascular Cardiology Consultation      HPI:     This is a 57 year old male with a PMHx of HTN, Bicupsid Aortic Valve with TAA (4.8 cm on previous MRAs) who is here for follow up. Of note, his mother has an aneurysm of her basilar artery diagnosed. No history of BAV or TAA in the family. He is currently on Atenolol and losartan and BP well controlled although diastolic sometimes elevated. MRA showing TAA is 4.8 cm and is stable. Had brief syncopal episode prompting an event monitor which demonstrated no sustained arrhythmias, no inappropriate sinus tachycardia, no afib and only an isolated episode of SVT (7 beats). Lowest HR was 46 bpm, no pauses. He had note of an IVCD/BBB but not present on EKG. No recurrent episodes.     His MRA today reviewed and is stable in size once again, at 4.8 cm. His last echocardiogram was in 2021 which showed no valvular dysfunction. RCC-LCC fushion.     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         Assessment and Plan:      57 year old male with a PMHx of HTN, Bicupsid Aortic Valve with TAA (4.8 cm on MRA) who is here for follow up.     #Thoracic Aortic Aneurysm  - Patient's MRA in 2022 showed an aortic size of 4.8 cm. Repeat MRA 4.8 cm.  - Repeat every year   - Will consider surgical repair at 4.5-5 cm (valve sparing aortic repair given normal functioning valve) - patient OK with surveillance plan at this time      #Bicupsid Aortic Valve  -Fusion of RCC and LCC   -No evidence of stenosis of regurgitation on ECHO  -Echo surveillance for valve function q2 years  -Will eventually need coronary angiogram   -Children have completed screening, no BAV     #Elevated LDL and carotid plaque < 50% on L side  - Started lipitor 20 mg per  day.  - Lipid panel surveillance per PCP     #Syncope:  -Vasovagal mediated, event monitor unremarkable for malignant arrhythmias or bradycardia  -Discussed autonomic dysfunction is very common in vascular genetic patients, and behavioral modifications are often necessary, especially when on a beta blocker for aortic protection.      Follow up in 1 year with me     Aretha Arzate MD MSc  Division of Cardiology      PAST MEDICAL HISTORY  Past Medical History:   Diagnosis Date     Aortic aneurysm (H)      Ascending aortic aneurysm (H) 3/1/2018    4.7 cm on echo       CURRENT MEDICATIONS  Current Outpatient Medications   Medication Sig Dispense Refill     atenolol (TENORMIN) 50 MG tablet Take 1 tablet (50 mg) by mouth daily 90 tablet 0     clobetasol (TEMOVATE) 0.05 % cream Apply 1 Application topically 2 times daily       atorvastatin (LIPITOR) 20 MG tablet Take 1 tablet (20 mg) by mouth daily (Patient not taking: No sig reported) 90 tablet 3     losartan (COZAAR) 25 MG tablet Take 1 tablet (25 mg) by mouth daily (Patient not taking: Reported on 8/1/2022) 90 tablet 3       PAST SURGICAL HISTORY:  History reviewed. No pertinent surgical history.    ALLERGIES   No Known Allergies    FAMILY HISTORY  History reviewed. No pertinent family history.    SOCIAL HISTORY  Social History     Socioeconomic History     Marital status:      Spouse name: Not on file     Number of children: Not on file     Years of education: Not on file     Highest education level: Not on file   Occupational History     Not on file   Tobacco Use     Smoking status: Never Smoker     Smokeless tobacco: Never Used   Substance and Sexual Activity     Alcohol use: Yes     Comment: ocassional      Drug use: No     Sexual activity: Not on file   Other Topics Concern     Parent/sibling w/ CABG, MI or angioplasty before 65F 55M? Not Asked   Social History Narrative     Not on file     Social Determinants of Health     Financial Resource Strain: Not  "on file   Food Insecurity: Not on file   Transportation Needs: Not on file   Physical Activity: Not on file   Stress: Not on file   Social Connections: Not on file   Intimate Partner Violence: Not on file   Housing Stability: Not on file       ROS:   Constitutional: No fever, chills, or sweats. No weight gain/loss   ENT: No visual disturbance, ear ache, epistaxis, sore throat  Allergies/Immunologic: Negative  Respiratory: No cough, hemoptysia  Cardiovascular: As per HPI  GI: No nausea, vomiting, hematemesis, melena, or hematochezia  : No urinary frequency, dysuria, or hematuria  Integument: Negative  Psychiatric: Negative  Neuro: Negative  Endocrinology: Negative   Musculoskeletal: Negative  Vascular: No walking impairment, claudication, ischemic rest pain or nonhealing wounds    EXAM:  BP (!) 126/91   Pulse 74   Ht 1.803 m (5' 11\")   Wt 80.7 kg (178 lb)   SpO2 99%   BMI 24.83 kg/m    In general, the patient is a pleasant male in no apparent distress.    HEENT: NC/AT.  PERRLA.  EOMI.  Sclerae white, not injected.  Nares clear.  Pharynx without erythema or exudate.  Dentition intact.    Neck: No adenopathy.  No thyromegaly. Carotids +2/2 bilaterally without bruits.  No jugular venous distension.   Heart: RRR. Normal S1, S2 splits physiologically. No murmur, rub, click, or gallop. The PMI is in the 5th ICS in the midclavicular line. There is no heave.    Lungs: CTA.  No ronchi, wheezes, rales.  No dullness to percussion.   Abdomen: Soft, nontender, nondistended. No organomegaly. No AAA.  No bruits.   Extremities: No clubbing, cyanosis, or edema.  No wounds. No varicose veins signs of chronic venous insufficiency.   Vascular: No bruits are noted.    Labs:  LIPID RESULTS:  Lab Results   Component Value Date    CHOL 162 06/11/2019    HDL 58 06/11/2019    LDL 76 06/11/2019    TRIG 166 (H) 01/07/2022    TRIG 138 06/11/2019    NHDL 104 06/11/2019       LIVER ENZYME RESULTS:  Lab Results   Component Value Date    AST " 17 05/24/2019    ALT 39 05/24/2019       CBC RESULTS:  Lab Results   Component Value Date    WBC 8.0 05/03/2019    RBC 4.84 05/03/2019    HGB 14.2 05/03/2019    HCT 45.1 05/03/2019    MCV 93 05/03/2019    MCH 29.3 05/03/2019    MCHC 31.5 05/03/2019    RDW 12.3 05/03/2019     05/03/2019       BMP RESULTS:  Lab Results   Component Value Date     05/24/2019    POTASSIUM 4.4 05/24/2019    CHLORIDE 103 01/07/2022    CHLORIDE 105 05/24/2019    CO2 28 05/24/2019    ANIONGAP 5 05/24/2019    GLC 91 05/24/2019    BUN 20 05/24/2019    CR 0.88 05/24/2019    GFRESTIMATED >90 05/24/2019    GFRESTBLACK >90 05/24/2019    HALEY 9.5 05/24/2019        A1C RESULTS:  No results found for: A1C    Thank you for allowing me to participate in the care of your patient.      Sincerely,     Aretha Arzate MD     [facility]  cc:   Aretha Arzate MD  [address] [place] [id number]

## 2022-09-03 ENCOUNTER — HEALTH MAINTENANCE LETTER (OUTPATIENT)
Age: 57
End: 2022-09-03

## 2023-06-02 ENCOUNTER — MYC MEDICAL ADVICE (OUTPATIENT)
Dept: CARDIOLOGY | Facility: CLINIC | Age: 58
End: 2023-06-02
Payer: COMMERCIAL

## 2023-06-02 DIAGNOSIS — I71.21 ASCENDING AORTIC ANEURYSM (H): ICD-10-CM

## 2023-06-02 RX ORDER — ATENOLOL 50 MG/1
50 TABLET ORAL DAILY
Qty: 90 TABLET | Refills: 2 | Status: SHIPPED | OUTPATIENT
Start: 2023-06-02 | End: 2024-05-22

## 2023-06-03 ENCOUNTER — HEALTH MAINTENANCE LETTER (OUTPATIENT)
Age: 58
End: 2023-06-03

## 2023-08-01 ENCOUNTER — HOSPITAL ENCOUNTER (OUTPATIENT)
Dept: CARDIOLOGY | Facility: CLINIC | Age: 58
Discharge: HOME | End: 2023-08-01
Attending: INTERNAL MEDICINE
Payer: COMMERCIAL

## 2023-08-01 ENCOUNTER — TELEPHONE (OUTPATIENT)
Dept: CARDIOLOGY | Facility: CLINIC | Age: 58
End: 2023-08-01

## 2023-08-01 DIAGNOSIS — I71.21 ASCENDING AORTIC ANEURYSM (H): ICD-10-CM

## 2023-08-01 LAB — LVEF ECHO: NORMAL

## 2023-08-01 PROCEDURE — A9585 GADOBUTROL INJECTION: HCPCS | Performed by: INTERNAL MEDICINE

## 2023-08-01 PROCEDURE — 71555 MRI ANGIO CHEST W OR W/O DYE: CPT

## 2023-08-01 PROCEDURE — 71555 MRI ANGIO CHEST W OR W/O DYE: CPT | Mod: 26 | Performed by: INTERNAL MEDICINE

## 2023-08-01 PROCEDURE — 255N000002 HC RX 255 OP 636: Performed by: INTERNAL MEDICINE

## 2023-08-01 PROCEDURE — 999N000208 ECHOCARDIOGRAM COMPLETE

## 2023-08-01 PROCEDURE — 93306 TTE W/DOPPLER COMPLETE: CPT | Mod: 26 | Performed by: INTERNAL MEDICINE

## 2023-08-01 RX ORDER — ALBUTEROL SULFATE 90 UG/1
2 AEROSOL, METERED RESPIRATORY (INHALATION) EVERY 5 MIN PRN
Status: DISCONTINUED | OUTPATIENT
Start: 2023-08-01 | End: 2023-08-02 | Stop reason: HOSPADM

## 2023-08-01 RX ORDER — REGADENOSON 0.08 MG/ML
0.4 INJECTION, SOLUTION INTRAVENOUS ONCE
Status: DISCONTINUED | OUTPATIENT
Start: 2023-08-01 | End: 2023-08-02 | Stop reason: HOSPADM

## 2023-08-01 RX ORDER — GADOBUTROL 604.72 MG/ML
16 INJECTION INTRAVENOUS ONCE
Status: COMPLETED | OUTPATIENT
Start: 2023-08-01 | End: 2023-08-01

## 2023-08-01 RX ORDER — DIAZEPAM 5 MG
5 TABLET ORAL EVERY 30 MIN PRN
Status: DISCONTINUED | OUTPATIENT
Start: 2023-08-01 | End: 2023-08-02 | Stop reason: HOSPADM

## 2023-08-01 RX ORDER — DIPHENHYDRAMINE HYDROCHLORIDE 50 MG/ML
25-50 INJECTION INTRAMUSCULAR; INTRAVENOUS
Status: DISCONTINUED | OUTPATIENT
Start: 2023-08-01 | End: 2023-08-02 | Stop reason: HOSPADM

## 2023-08-01 RX ORDER — ACYCLOVIR 200 MG/1
0-1 CAPSULE ORAL
Status: DISCONTINUED | OUTPATIENT
Start: 2023-08-01 | End: 2023-08-02 | Stop reason: HOSPADM

## 2023-08-01 RX ORDER — CAFFEINE CITRATE 20 MG/ML
60 SOLUTION INTRAVENOUS
Status: DISCONTINUED | OUTPATIENT
Start: 2023-08-01 | End: 2023-08-02 | Stop reason: HOSPADM

## 2023-08-01 RX ORDER — METHYLPREDNISOLONE SODIUM SUCCINATE 125 MG/2ML
125 INJECTION, POWDER, LYOPHILIZED, FOR SOLUTION INTRAMUSCULAR; INTRAVENOUS
Status: DISCONTINUED | OUTPATIENT
Start: 2023-08-01 | End: 2023-08-02 | Stop reason: HOSPADM

## 2023-08-01 RX ORDER — DIPHENHYDRAMINE HCL 25 MG
25 CAPSULE ORAL
Status: DISCONTINUED | OUTPATIENT
Start: 2023-08-01 | End: 2023-08-02 | Stop reason: HOSPADM

## 2023-08-01 RX ORDER — AMINOPHYLLINE 25 MG/ML
100 INJECTION, SOLUTION INTRAVENOUS ONCE
Status: DISCONTINUED | OUTPATIENT
Start: 2023-08-01 | End: 2023-08-02 | Stop reason: HOSPADM

## 2023-08-01 RX ORDER — ONDANSETRON 2 MG/ML
4 INJECTION INTRAMUSCULAR; INTRAVENOUS
Status: DISCONTINUED | OUTPATIENT
Start: 2023-08-01 | End: 2023-08-02 | Stop reason: HOSPADM

## 2023-08-01 RX ADMIN — GADOBUTROL 16 ML: 604.72 INJECTION INTRAVENOUS at 15:35

## 2023-08-01 RX ADMIN — HUMAN ALBUMIN MICROSPHERES AND PERFLUTREN 9 ML: 10; .22 INJECTION, SOLUTION INTRAVENOUS at 14:50

## 2023-08-01 NOTE — TELEPHONE ENCOUNTER
Results noted. Patient does not have follow up appointment with Dr. Arzate until 10/23. RN will send results to Dr. Arzate to inquire if any further recommendations based off test results prior to visit.         8/1/23 MRA chest    1. Moderately dilated mid ascending aorta measuring 4.9 cmx 4.8 cm.  The aortic root, transverse arch and  descending thoracic aorta are normal in size without an aneurysm or dissection.     2. The aortic arch is left sided. There is normal branching of the arch vessels. There is no coarctation.     3. The main and proximal branch pulmonary arteries are normal in size.      4. The systemic venous connections are normal.         VASCULAR      8/1/23 ECHO  Interpretation Summary     Known bicuspid aortic valve with complete right-left raphe.  Trace aortic valve regurgitation, no significant stenosis.  Mean systolic gradient 8 mmHg.  Aortic root measures 3.8 cm.  Ascending aorta aneurysm of 4.8-4.9 cm.  Left ventricular systolic function is normal.  The visual ejection fraction is 60-65%.  Left ventricular diastolic function is normal.  No regional wall motion abnormalities.     Compared to the prior study dated 4/27/2021, there have been no changes

## 2023-08-09 NOTE — TELEPHONE ENCOUNTER
Aretha Arzate MD Graf, Jay Gramajo, RN1 hour ago (11:55 AM)     CF  Thanks Jay. Stable size, will discuss at [facility]. Valve is well functioning. If Robson has any concerns he can certainly reach out.    Best,  Dr. Arzate     Pt updated via [employer]

## 2023-09-07 NOTE — PROGRESS NOTES
Date: 5/7/2019    Time of Call: 2:25 PM     Diagnosis:  ER episode for syncope - new RBBB on EKG     [ TORB ] Ordering provider: Dr. Aretha Patel  Order: 14 day Ziopatch monitor. Follow up after     Order received by: Damien Mcallister      Follow-up/additional notes: Called patient.  States he is feeling fine, no more concerns. Patient agreeable to wear ziopatch.       
No

## 2023-10-24 ENCOUNTER — OFFICE VISIT (OUTPATIENT)
Dept: CARDIOLOGY | Facility: CLINIC | Age: 58
End: 2023-10-24
Payer: COMMERCIAL

## 2023-10-24 ENCOUNTER — DOCUMENTATION ONLY (OUTPATIENT)
Dept: CARDIOLOGY | Facility: CLINIC | Age: 58
End: 2023-10-24

## 2023-10-24 VITALS
HEART RATE: 77 BPM | SYSTOLIC BLOOD PRESSURE: 108 MMHG | OXYGEN SATURATION: 98 % | HEIGHT: 72 IN | BODY MASS INDEX: 23.65 KG/M2 | DIASTOLIC BLOOD PRESSURE: 78 MMHG | WEIGHT: 174.6 LBS

## 2023-10-24 DIAGNOSIS — Q23.81 BICUSPID AORTIC VALVE: Primary | ICD-10-CM

## 2023-10-24 PROCEDURE — 99214 OFFICE O/P EST MOD 30 MIN: CPT | Performed by: INTERNAL MEDICINE

## 2023-10-24 NOTE — PROGRESS NOTES
Spoke with pt, coordinated a New Consult with Dr Torres.  Pt aware location/time  Offered My Direct #

## 2023-10-24 NOTE — PROGRESS NOTES
Vascular Cardiology    HPI:     This is a 58 year old male with a PMHx of HTN, Bicuspid Aortic Valve with TAA (4.8 cm on previous MRAs) who is here for follow up. Of note, his mother has an aneurysm of her basilar artery diagnosed. No history of BAV or TAA in the family. MRA showing TAA is 4.8 cm and is stable.     In past, he had a brief syncopal episode prompting an event monitor which demonstrated no sustained arrhythmias, no inappropriate sinus tachycardia, no afib and only an isolated episode of SVT (7 beats). Lowest HR was 46 bpm, no pauses. He had note of an IVCD/BBB but not present on EKG. No recurrent episodes.     We reviewed his serial MRAs during today's visit. There has been slight growth from prior MRA, now in the range of 4.9 cm.           Assessment and Plan:      58 year old male with a PMHx of HTN, Bicupsid Aortic Valve with TAA (4.8 cm on MRA with slight growth on last MRA) who is here for follow up.     #Thoracic Aortic Aneurysm  -hereditary subtype, with high risk features including fam history of aneurysm disease but no h/o SCD or dissection.   -he did experience growth on this last scan therefore in shared decision making approach discussed possible surgery while his STS score is low. He would like to understand the surgery and thus refer to Dr. Torres for discussion. Should he decide on elective repair then we would have him undergo coronary angiogram.      #Bicupsid Aortic Valve  -Fusion of RCC and LCC   -No evidence of stenosis of regurgitation on ECHO  -Echo surveillance for valve function q2 years  -Will eventually need coronary angiogram   -Children have completed screening, no BAV     #Elevated LDL and carotid plaque < 50% on L side  - Started lipitor 20 mg per day.  - Lipid panel surveillance per PCP     #Syncope:  -Vasovagal mediated, event monitor unremarkable for malignant arrhythmias or bradycardia  -Discussed autonomic dysfunction is very common in vascular CTD and  behavioral modifications are often necessary, especially when on a beta blocker for aortic protection.      Follow up in 1 year with me, sooner if he decides on elective repair.      Aretha Arzate MD MSc        PAST MEDICAL HISTORY  Past Medical History:   Diagnosis Date    Aortic aneurysm (H24)     Ascending aortic aneurysm (H24) 3/1/2018    4.7 cm on echo       CURRENT MEDICATIONS  Current Outpatient Medications   Medication Sig Dispense Refill    atenolol (TENORMIN) 50 MG tablet Take 1 tablet (50 mg) by mouth daily 90 tablet 2    atorvastatin (LIPITOR) 20 MG tablet Take 1 tablet (20 mg) by mouth daily (Patient not taking: No sig reported) 90 tablet 3    clobetasol (TEMOVATE) 0.05 % cream Apply 1 Application topically 2 times daily      losartan (COZAAR) 25 MG tablet Take 1 tablet (25 mg) by mouth daily (Patient not taking: Reported on 8/1/2022) 90 tablet 3       PAST SURGICAL HISTORY:  No past surgical history on file.    ALLERGIES   No Known Allergies    FAMILY HISTORY  See hpi     SOCIAL HISTORY  Social History     Socioeconomic History    Marital status:      Spouse name: Not on file    Number of children: Not on file    Years of education: Not on file    Highest education level: Not on file   Occupational History    Not on file   Tobacco Use    Smoking status: Never    Smokeless tobacco: Never   Substance and Sexual Activity    Alcohol use: Yes     Comment: ocassional     Drug use: No    Sexual activity: Not on file   Other Topics Concern    Parent/sibling w/ CABG, MI or angioplasty before 65F 55M? Not Asked   Social History Narrative    Not on file     Social Determinants of Health     Financial Resource Strain: Not on file   Food Insecurity: Not on file   Transportation Needs: Not on file   Physical Activity: Not on file   Stress: Not on file   Social Connections: Not on file   Interpersonal Safety: Not on file   Housing Stability: Not on file       ROS:   Constitutional: No fever, chills, or  sweats. No weight gain/loss   ENT: No visual disturbance, ear ache, epistaxis, sore throat  Allergies/Immunologic: Negative  Respiratory: No cough, hemoptysia  Cardiovascular: As per HPI  GI: No nausea, vomiting, hematemesis, melena, or hematochezia  : No urinary frequency, dysuria, or hematuria  Integument: Negative  Psychiatric: Negative  Neuro: Negative  Endocrinology: Negative   Musculoskeletal: Negative  Vascular: No walking impairment, claudication, ischemic rest pain or nonhealing wounds    EXAM:  /78   Pulse 77   Ht 1.829 m (6')   Wt 79.2 kg (174 lb 9.6 oz)   SpO2 98%   BMI 23.68 kg/m    In general, the patient is a pleasant male in no apparent distress.    HEENT: NC/AT.  PERRLA.  EOMI.  Sclerae white, not injected.  Nares clear.  Pharynx without erythema or exudate.  Dentition intact.    Neck: No adenopathy.  No thyromegaly. Carotids +2/2 bilaterally without bruits.  No jugular venous distension.   Heart: RRR. Normal S1, S2 splits physiologically. No murmur, rub, click, or gallop. The PMI is in the 5th ICS in the midclavicular line. There is no heave.    Lungs: CTA.  No ronchi, wheezes, rales.  No dullness to percussion.   Abdomen: Soft, nontender, nondistended. No organomegaly. No AAA.  No bruits.   Extremities: No clubbing, cyanosis, or edema.  No wounds. No varicose veins signs of chronic venous insufficiency.   Vascular: No bruits are noted.    Labs:  LIPID RESULTS:  Lab Results   Component Value Date    CHOL 162 06/11/2019    HDL 58 06/11/2019    LDL 76 06/11/2019    TRIG 166 (H) 01/07/2022    TRIG 138 06/11/2019    NHDL 104 06/11/2019       LIVER ENZYME RESULTS:  Lab Results   Component Value Date    AST 17 05/24/2019    ALT 39 05/24/2019       CBC RESULTS:  Lab Results   Component Value Date    WBC 8.0 05/03/2019    RBC 4.84 05/03/2019    HGB 14.2 05/03/2019    HCT 45.1 05/03/2019    MCV 93 05/03/2019    MCH 29.3 05/03/2019    MCHC 31.5 05/03/2019    RDW 12.3 05/03/2019      "05/03/2019       BMP RESULTS:  Lab Results   Component Value Date     05/24/2019    POTASSIUM 4.4 05/24/2019    CHLORIDE 103 01/07/2022    CHLORIDE 105 05/24/2019    CO2 28 05/24/2019    ANIONGAP 5 05/24/2019    GLC 91 05/24/2019    BUN 20 05/24/2019    CR 0.88 05/24/2019    GFRESTIMATED >90 05/24/2019    GFRESTBLACK >90 05/24/2019    HALEY 9.5 05/24/2019        A1C RESULTS:  No results found for: \"A1C\"      "

## 2023-10-24 NOTE — PATIENT INSTRUCTIONS
1. Referral to Dr. Torres   2. Follow up Dr. Arzate one year (sooner if undergoing surgery)  3. If surgery planned then will schedule coronary angiogram

## 2023-10-24 NOTE — Clinical Note
10/24/2023    Robin Brice MD, MD  [address]  [facility] [address]    RE: Robson Brenda       Dear Colleague,     I had the pleasure of seeing Robson Brenda in the [facility].  HPI:     This is a 57 year old male with a PMHx of HTN, Bicupsid Aortic Valve with TAA (4.8 cm on previous MRAs) who is here for follow up. Of note, his mother has an aneurysm of her basilar artery diagnosed. No history of BAV or TAA in the family. He is currently on Atenolol and losartan and BP well controlled although diastolic sometimes elevated. MRA showing TAA is 4.8 cm and is stable. Had brief syncopal episode prompting an event monitor which demonstrated no sustained arrhythmias, no inappropriate sinus tachycardia, no afib and only an isolated episode of SVT (7 beats). Lowest HR was 46 bpm, no pauses. He had note of an IVCD/BBB but not present on EKG. No recurrent episodes.      His MRA today reviewed and is stable in size once again, at 4.8 cm. His last echocardiogram was in 2021 which showed no valvular dysfunction. RCC-LCC fushion.      Here to discuss results. MRA reviewed and slight         Assessment and Plan:      57 year old male with a PMHx of HTN, Bicupsid Aortic Valve with TAA (4.8 cm on MRA) who is here for follow up.     #Thoracic Aortic Aneurysm  - Patient's MRA in 2022 showed an aortic size of 4.8 cm. Repeat MRA 4.8 cm.  - Repeat every year   - Will consider surgical repair at 4.5-5 cm (valve sparing aortic repair given normal functioning valve) - patient OK with surveillance plan at this time      #Bicupsid Aortic Valve  -Fusion of RCC and LCC   -No evidence of stenosis of regurgitation on ECHO  -Echo surveillance for valve function q2 years  -Will eventually need coronary angiogram   -Children have completed screening, no BAV     #Elevated LDL and carotid plaque < 50% on L side  - Started lipitor 20 mg per day.  - Lipid panel surveillance per PCP     #Syncope:  -Vasovagal  "mediated, event monitor unremarkable for malignant arrhythmias or bradycardia  -Discussed autonomic dysfunction is very common in vascular genetic patients, and behavioral modifications are often necessary, especially when on a beta blocker for aortic protection.      Follow up in 1 year with me     Aretha Arzate MD MSc  Division of Cardiology        PAST MEDICAL HISTORY  Past Medical History:   Diagnosis Date    Aortic aneurysm (H24)     Ascending aortic aneurysm (H24) 3/1/2018    4.7 cm on echo       CURRENT MEDICATIONS  Current Outpatient Medications   Medication Sig Dispense Refill    atenolol (TENORMIN) 50 MG tablet Take 1 tablet (50 mg) by mouth daily 90 tablet 2    atorvastatin (LIPITOR) 20 MG tablet Take 1 tablet (20 mg) by mouth daily (Patient not taking: No sig reported) 90 tablet 3    clobetasol (TEMOVATE) 0.05 % cream Apply 1 Application topically 2 times daily      losartan (COZAAR) 25 MG tablet Take 1 tablet (25 mg) by mouth daily (Patient not taking: Reported on 8/1/2022) 90 tablet 3       PAST SURGICAL HISTORY:  No past surgical history on file.    ALLERGIES   No Known Allergies    FAMILY HISTORY  No family history on file.    VASCULAR FAMILY HISTORY  1st order relative with atherosclerotic PAD: {YES / NO:388046::\"Yes\"}  1st order relative with AAA: {YES / NO:953722::\"Yes\"}    SOCIAL HISTORY  Social History     Socioeconomic History    Marital status:      Spouse name: Not on file    Number of children: Not on file    Years of education: Not on file    Highest education level: Not on file   Occupational History    Not on file   Tobacco Use    Smoking status: Never    Smokeless tobacco: Never   Substance and Sexual Activity    Alcohol use: Yes     Comment: ocassional     Drug use: No    Sexual activity: Not on file   Other Topics Concern    Parent/sibling w/ CABG, MI or angioplasty before 65F 55M? Not Asked   Social History Narrative    Not on file     Social Determinants of Health "     Financial Resource Strain: Not on file   Food Insecurity: Not on file   Transportation Needs: Not on file   Physical Activity: Not on file   Stress: Not on file   Social Connections: Not on file   Interpersonal Safety: Not on file   Housing Stability: Not on file       ROS:   Constitutional: No fever, chills, or sweats. No weight gain/loss   ENT: No visual disturbance, ear ache, epistaxis, sore throat  Allergies/Immunologic: Negative  Respiratory: No cough, hemoptysia  Cardiovascular: As per HPI  GI: No nausea, vomiting, hematemesis, melena, or hematochezia  : No urinary frequency, dysuria, or hematuria  Integument: Negative  Psychiatric: Negative  Neuro: Negative  Endocrinology: Negative   Musculoskeletal: Negative  Vascular: No walking impairment, claudication, ischemic rest pain or nonhealing wounds    EXAM:  /78   Pulse 77   Ht 1.829 m (6')   Wt 79.2 kg (174 lb 9.6 oz)   SpO2 98%   BMI 23.68 kg/m    In general, the patient is a pleasant male in no apparent distress.    HEENT: NC/AT.  PERRLA.  EOMI.  Sclerae white, not injected.  Nares clear.  Pharynx without erythema or exudate.  Dentition intact.    Neck: No adenopathy.  No thyromegaly. Carotids +2/2 bilaterally without bruits.  No jugular venous distension.   Heart: RRR. Normal S1, S2 splits physiologically. No murmur, rub, click, or gallop. The PMI is in the 5th ICS in the midclavicular line. There is no heave.    Lungs: CTA.  No ronchi, wheezes, rales.  No dullness to percussion.   Abdomen: Soft, nontender, nondistended. No organomegaly. No AAA.  No bruits.   Extremities: No clubbing, cyanosis, or edema.  No wounds. No varicose veins signs of chronic venous insufficiency.   Vascular: No bruits are noted.   Brachial Radial Ulnar Femoral Popliteal DP PT   Left ***/2 ***/2 ***/2 ***/2 ***/2 ***/2 ***/2   Right ***/2 ***/2 ***/2 ***/2 ***/2 ***/2 ***/2     Labs:  LIPID RESULTS:  Lab Results   Component Value Date    CHOL 162 06/11/2019    HDL 58  "06/11/2019    LDL 76 06/11/2019    TRIG 166 (H) 01/07/2022    TRIG 138 06/11/2019    NHDL 104 06/11/2019       LIVER ENZYME RESULTS:  Lab Results   Component Value Date    AST 17 05/24/2019    ALT 39 05/24/2019       CBC RESULTS:  Lab Results   Component Value Date    WBC 8.0 05/03/2019    RBC 4.84 05/03/2019    HGB 14.2 05/03/2019    HCT 45.1 05/03/2019    MCV 93 05/03/2019    MCH 29.3 05/03/2019    MCHC 31.5 05/03/2019    RDW 12.3 05/03/2019     05/03/2019       BMP RESULTS:  Lab Results   Component Value Date     05/24/2019    POTASSIUM 4.4 05/24/2019    CHLORIDE 103 01/07/2022    CHLORIDE 105 05/24/2019    CO2 28 05/24/2019    ANIONGAP 5 05/24/2019    GLC 91 05/24/2019    BUN 20 05/24/2019    CR 0.88 05/24/2019    GFRESTIMATED >90 05/24/2019    GFRESTBLACK >90 05/24/2019    HALEY 9.5 05/24/2019        A1C RESULTS:  No results found for: \"A1C\"    Procedures:      Assessment and Plan: ***      Thank you for allowing me to participate in the care of your patient.      Sincerely,     Aretha Arzate MD     [facility]  cc:   No referring provider defined for this encounter.      "

## 2023-10-30 ENCOUNTER — OFFICE VISIT (OUTPATIENT)
Dept: CARDIOLOGY | Facility: CLINIC | Age: 58
End: 2023-10-30
Payer: COMMERCIAL

## 2023-10-30 ENCOUNTER — TELEPHONE (OUTPATIENT)
Dept: CARDIOLOGY | Facility: CLINIC | Age: 58
End: 2023-10-30

## 2023-10-30 ENCOUNTER — PREP FOR PROCEDURE (OUTPATIENT)
Dept: CARDIOLOGY | Facility: CLINIC | Age: 58
End: 2023-10-30

## 2023-10-30 ENCOUNTER — DOCUMENTATION ONLY (OUTPATIENT)
Dept: OTHER | Facility: CLINIC | Age: 58
End: 2023-10-30

## 2023-10-30 VITALS
WEIGHT: 175.5 LBS | SYSTOLIC BLOOD PRESSURE: 132 MMHG | HEART RATE: 80 BPM | DIASTOLIC BLOOD PRESSURE: 98 MMHG | BODY MASS INDEX: 23.77 KG/M2 | HEIGHT: 72 IN

## 2023-10-30 DIAGNOSIS — Q23.81 BICUSPID AORTIC VALVE: ICD-10-CM

## 2023-10-30 DIAGNOSIS — I71.21 ASCENDING AORTIC ANEURYSM (H): Primary | ICD-10-CM

## 2023-10-30 PROCEDURE — 99204 OFFICE O/P NEW MOD 45 MIN: CPT | Performed by: SURGERY

## 2023-10-30 RX ORDER — LATANOPROST 50 UG/ML
1 SOLUTION/ DROPS OPHTHALMIC EVERY EVENING
COMMUNITY
Start: 2023-10-10

## 2023-10-30 RX ORDER — DORZOLAMIDE HYDROCHLORIDE AND TIMOLOL MALEATE 20; 5 MG/ML; MG/ML
1 SOLUTION/ DROPS OPHTHALMIC 2 TIMES DAILY
COMMUNITY

## 2023-10-30 NOTE — TELEPHONE ENCOUNTER
Received a call from Heather at [facility] stating that patient needs to have a tooth removal is it is cracked and they typically use 2% lidocaine and 1:100,000 Epinephrine. Heather is wondering if Dr. Arzate feels that is ok to use for the patient or should they not use Epinephrine.     Will route to Dr. Arzate to review.

## 2023-10-30 NOTE — PROGRESS NOTES
I met with patient and his spouse in consultation with Dr. Torres. Literature and contact information given. Plan Ascending Aortic Aneurysm Repair in late spring after the academic year. Will need coronary angiogram pre op. All questions addressed today.

## 2023-11-01 ENCOUNTER — TELEPHONE (OUTPATIENT)
Dept: CARDIOLOGY | Facility: CLINIC | Age: 58
End: 2023-11-01
Payer: COMMERCIAL

## 2023-11-01 NOTE — TELEPHONE ENCOUNTER
Per task, pt need to schedule surgery with Dr. Torres. Per pt request, would like to wait until May. Talked with pt and scheduled surgery for 5/13. Will call if anything changes

## 2023-11-06 NOTE — PROGRESS NOTES
[facility]  Cardiovascular and Thoracic Surgery Clinic Consultation    Robson Gutierrez MRN# [id number]   YOB: 1965 Age: 58 year old        Reason for consult: I was asked by Dr. Arzate to evaluate this patient for ascending aortic aneurysm.           Assessment and Plan:   Mr. Gutierrez is a very pleasant 59 yo  here at the [facility] who has a bicuspid aortic valve with a 4.9 cm ascending aortic aneurysm. His aortic root is normal and his aortic valve appears to be Luis Type 1without any AI or stenosis with a mean gradient of 9 mm Hg. He has not yet had a coronary angiogram.     My recommendation is an ascending aorta replacement. I went over the diagnosis in detail along with the MRA findings and the TTE findings and his bicuspid aortopathy. I went over the risks and benefits of the operation, as well as the possible complications associated with the surgery. These complications include, but are not strictly limited to, infection, bleeding, stroke, postop MI, postop arrhythmias, pulmonary and renal complications. I think he is an overall excellent surgical candidate, and I quoted an operative mortality risk of 1%. He understands and agrees to proceed. 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    It was a pleasure to meet Mr. Gutierrez today, and thank you very much for this referral.              Attestation:  Candis Torres MD           Chief Complaint:   Ascending aortic aneurysm.               History of Present Illness:   This patient is a 58 year old male who presents with a 4.9 cm ascending aortic aneurysm in the setting of a bicuspid aortic valve.               Past Medical History:     Past Medical History:   Diagnosis Date    Aortic aneurysm (H24)     Ascending aortic aneurysm (H24) 3/1/2018    4.7 cm on echo             Past Surgical History:    No past surgical history on file.            Social History:     Social History     Tobacco Use    Smoking status: Never    Smokeless tobacco: Never   Substance Use Topics    Alcohol use: Yes     Comment: ocassional              Family History:   No family history on file.          Immunizations:     Immunization History   Administered Date(s) Administered    COVID-19 Bivalent 18+ (Moderna) 10/10/2022    COVID-19 Monovalent 18+ (Moderna) 04/01/2021, 04/28/2021, 11/07/2021, 04/07/2022    DTaP, Unspecified 06/27/2009    FLU 6-35 months 12/14/2012    Flu, Unspecified 12/03/2010, 12/14/2012, 01/27/2014    HepA, Unspecified 06/01/2008, 11/13/2008    Influenza Vaccine >6 months (Alfuria,Fluzone) 11/03/2017    TD,PF 7+ (Tenivac) 03/15/2008, 11/14/2008, 10/07/2009             Allergies:   No Known Allergies          Medications:     Current Outpatient Medications Ordered in Epic   Medication    atenolol (TENORMIN) 50 MG tablet    dorzolamide-timolol (COSOPT) 22.3-6.8 MG/ML ophthalmic solution    latanoprost (XALATAN) 0.005 % ophthalmic solution     No current [facility]-ordered facility-administered medications on file.             Review of Systems:   The 10 point Review of Systems is negative other than noted in the HPI         Physical Exam:   Vitals were reviewed                     Gen: pleasant, NAD  CV: RRR, normal S1 and S2, no M/G/R  Resp: CTAB  Abd: soft, NT/ND  Ext: no edema or cyanosis  Neuro: no focal deficits           Data:     Lab Results   Component Value Date    WBC 8.0 05/03/2019    HGB 14.2 05/03/2019    HCT 45.1 05/03/2019     05/03/2019     05/24/2019    POTASSIUM 4.4 05/24/2019    CHLORIDE 103 01/07/2022    CO2 28 05/24/2019    BUN 20 05/24/2019    CR 0.88 05/24/2019    GLC 91 05/24/2019    TROPONIN 0.00 05/03/2019    AST 17 05/24/2019    ALT 39 05/24/2019    ALKPHOS 87 05/24/2019    BILITOTAL 0.6 05/24/2019    INR 1.06 04/19/2018     MRA Chest 8/1/23:    Study Result    Narrative &  Impression                                                    [facility]                                                      CMR Report       MRN:               [id number]                                  Name:        JAMES WHIPPLE                                   :                                                Scan Date:        2023-Aug-01                                   Electronically signed by Curt Gaffney 2023-Aug-01 16:23:33     VITALS   ==========================================================================================================     HEIGHT: 71.0 in    (180.3 cm)  WEIGHT: 178.0 lbs    (80.7 kgs)  BSA: 2.01 m^2     FINAL IMPRESSION   ==========================================================================================================     Moderately dilated ascending aorta measuring 4.9 cm x 4.8cm.  Prior MRA from  revealed maximal ascending aorta diameter of 4.8cm.      SUMMARY   ==========================================================================================================     1. Moderately dilated mid ascending aorta measuring 4.9 cmx 4.8 cm.  The aortic root, transverse arch and  descending thoracic aorta are normal in size without an aneurysm or dissection.     2. The aortic arch is left sided. There is normal branching of the arch vessels. There is no coarctation.     3. The main and proximal branch pulmonary arteries are normal in size.      4. The systemic venous connections are normal.         VASCULAR   ==========================================================================================================     UPPER VASCULAR   ----------------------------------------------------------------------------------------     EVALUATION OF THE THORACIC AORTA  ================================       COMMENTS        (no comments)     .----------------------------------------------------------------------------------------------------.   EVALUATION DETAILS  (Thoracic Aorta)                                                                  ----------------------------------------------------------------------------------------------------   AORTIC ROOT                                                                                           Annulus Max Diameter A:  3.7 cm                                                                       Annulus Max Diameter B:  3.2 cm                                                                       Sinotubular Junction Max Diameter A:  3 cm                                                            Sinotubular Junction Max Diameter B:  3.1 cm                                                         +----------------------------------------------------------------------------------------------------+   ASCENDING AORTA                                                                                       Dimension A:  4.9 cm                                                                                  Dimension B:  4.8 cm                                                                                 +----------------------------------------------------------------------------------------------------+   ARCH OF THE AORTA                                                                                     Dimension A:  2.7 cm                                                                                  Dimension B:  2.7 cm                                                                                 +----------------------------------------------------------------------------------------------------+   ISTHMUS OF THE AORTA                                                                                  Dimension A:  3.1 cm                                                                                  Dimension B:  3.1 cm                                                                                  +----------------------------------------------------------------------------------------------------+   MID-DESCENDING AORTA                                                                                  Dimension A:  2.4 cm                                                                                  Dimension B:  2.4 cm                                                                                 +----------------------------------------------------------------------------------------------------+   DISTAL DESCENDING AORTA                                                                               Dimension A:  2.3 cm                                                                                  Dimension B:  2.2 cm                                                                                 .----------------------------------------------------------------------------------------------------.        SCAN INFO   ==========================================================================================================     GENERAL   ----------------------------------------------------------------------------------------      SCANNER       ----------------------------------------------          :  SIEMENS          MODEL:  Aera         CONTRAST AGENT       ----------------------------------------------          TYPE:  Gadavist          GD CONCENTRATION:  0.5 M          VOLUME ADMINISTERED:  16 ml          DOSAGE:  0.10 mmol/kg         SETUP       ----------------------------------------------          REASON(S) FOR SCAN:  Aorta           REFERRING PHYSICIAN:  AUSTIN MATSON          ATTENDING PHYSICIAN:  AUSTIN YANG   ==========================================================================================================     CPT Codes  ICD10 Codes      I71.21        Report generated by [employer], a product of [employer]     Linked  Documents    View PDF Report     Order-Level Documents:    There are no order-level documents.  Lab and Collection    MRA Chest with Contrast (Order: [id number]) - 8/1/2023  Result History    MRA Chest with Contrast (Order #[id number]) on 8/1/2023 - Order Result History Report  Result Information    Status: Edited Result - FINAL (Exam End: 8/1/2023  3:40 PM) Provider Status: Reviewed     Reading Providers     Reading Role Read Date   Curt Gaffney MD  8/1/2023     Signed by    Signed Date/Time  Phone Pager   CURT GAFFNEY 8/01/2023 16:23 [phone]      Reviewed by    Eusebia Castaneda RN 8/10/2023  8:13 AM     Associated Diagnoses    Ascending aortic aneurysm (H24) [I71.21]          MRA Chest with Contrast: Patient Communication     Add Comments   Seen     Patient Release Status:    This result is viewable by the patient in MyChart.     Last viewed in MyChart:    8/1/2023  8:32 PM     By:    Robson Gutierrez              MRA Chest with Contrast: Result Notes     Jay Gregg RN  8/1/2023  4:29 PM CDT       See 8/1/23 telephone encounter.     Jay HERNANDEZ RN                  Recipient List for Orders    Sent From To Cc'd Forwarded To Results   8/1/2023  4:23 PM Interface, Radiant Ib Two Aretha Arzate MD   MRA Chest with Contrast [[id number]]                     Order Providers    Authorizing Provider Encounter Provider Billing Provider   Aretha Arzate MD [id number] Curt Gaffney MD     Base CPT Code (Reference Only)      Code CPT [employer]   [id number]     [id number]              TTE 8/1/23:    Echocardiogram Complete  Order: [id number]  Status: Edited Result - FINAL       Visible to patient: Yes (seen)       Next appt: None       Dx: Ascending aortic aneurysm (H24)    1 Result Note       1 Patient Communication  Details    Reading Physician Reading Date Result Priority   Jerson De La Torre MD  [phone] 8/1/2023      Narrative &  [facility]  [id number]  [facility]  [id number]  [id number]^HUYEN^AUSTIN     [facility]  Echocardiography Laboratory  [address] [place] [address]     Name: JAMES WHIPPLE  MRN: [id number]  : 1965  Study Date: 2023 01:59 PM  Age: 58 yrs  Gender: Male  Patient Location: [facility]  Reason For Study: Ascending aortic aneurysm (H)  Ordering Physician: AUSTIN MATSON  Referring Physician: AUSTIN MATSON  Performed By: Familia Torres     BSA: 2.0 m2  Height: 72 in  Weight: 175 lb  HR: 99  BP: 120/78 mmHg  ______________________________________________________________________________  Procedure  Complete Echo Adult. Optison (NDC #[id number]) given intravenously.  ______________________________________________________________________________  Interpretation Summary     Known bicuspid aortic valve with complete right-left raphe.  Trace aortic valve regurgitation, no significant stenosis.  Mean systolic gradient 8 mmHg.  Aortic root measures 3.8 cm.  Ascending aorta aneurysm of 4.8-4.9 cm.  Left ventricular systolic function is normal.  The visual ejection fraction is 60-65%.  Left ventricular diastolic function is normal.  No regional wall motion abnormalities.     Compared to the prior study dated 2021, there have been no changes.  ______________________________________________________________________________  Left Ventricle  The left ventricle is normal in size. There is normal left ventricular wall  thickness. Left ventricular systolic function is normal. The visual ejection  fraction is 60-65%. Left ventricular diastolic function is normal. No regional  wall motion abnormalities noted.     Right Ventricle  The right ventricle is normal in size and function.     Atria  Normal left atrial size. Right atrial size is normal. There is no color  Doppler evidence of an atrial shunt.     Mitral Valve  The mitral valve leaflets appear normal. There is no evidence of  stenosis,  fluttering, or prolapse. There is trace mitral regurgitation. There is no  mitral valve stenosis.     Tricuspid Valve  Normal tricuspid valve. There is trace tricuspid regurgitation.     Aortic Valve  Bicuspid aortic valve with a complete LCC/RCC raphe. There is trace aortic  regurgitation. No hemodynamically significant valvular aortic stenosis. The  mean AoV pressure gradient is 8.0 mmHg.     Pulmonic Valve  Normal pulmonic valve. There is trace pulmonic valvular regurgitation. Normal  pulmonic valve velocity.     Vessels  The aortic root is normal size. The ascending aorta is Moderately dilated.  4.8-4.9 cm. The inferior vena cava was normal in size with preserved  respiratory variability.     Pericardium  There is no pericardial effusion.     Rhythm  Sinus rhythm was noted.  ______________________________________________________________________________  MMode/2D Measurements & Calculations  IVSd: 0.96 cm     LVIDd: 4.0 cm  LVIDs: 2.5 cm  LVPWd: 0.92 cm  FS: 38.2 %  LV mass(C)d: 118.8 grams  LV mass(C)dI: 59.0 grams/m2  Ao root diam: 3.8 cm  LA dimension: 2.8 cm  asc Aorta Diam: 4.8 cm  LA/Ao: 0.73  LVOT diam: 2.3 cm  LVOT area: 4.2 cm2  RWT: 0.46  TAPSE: 2.0 cm     Doppler Measurements & Calculations  MV E max ben: 70.3 cm/sec  MV A max ben: 57.0 cm/sec  MV E/A: 1.2  MV dec time: 0.21 sec  Ao V2 max: 196.0 cm/sec  Ao max PG: 15.0 mmHg  Ao V2 mean: 131.0 cm/sec  Ao mean P.0 mmHg  Ao V2 VTI: 39.4 cm  FINESSE(I,D): 2.3 cm2  FINESSE(V,D): 2.5 cm2  LV V1 max P.6 mmHg  LV V1 max: 118.0 cm/sec  LV V1 VTI: 21.9 cm  SV(LVOT): 92.1 ml  SI(LVOT): 45.8 ml/m2  PA acc time: 0.15 sec     TR max ben: 212.3 cm/sec  TR max P.0 mmHg  AV Ben Ratio (DI): 0.60  FINESSE Index (cm2/m2): 1.2  E/E' av.0  Lateral E/e': 6.1  Medial E/e': 6.0  RV S Ben: 12.6 cm/sec     ______________________________________________________________________________  Report approved by: Dr Jerson Amezcua 2023 04:22 PM                Component 3 mo ago   LVEF 60-65%   Resulting Agency [facility]              Specimen Collected: 08/01/23  1:59 PM Last Resulted: 08/01/23  1:59 PM        Lab Flowsheet        Order Details        View Encounter        Lab and Collection Details        Routing        Result History     View All Conversations on this Encounter           Linked Documents    View Image      Result Care Coordination      Result Notes     Jay Gregg RN  8/1/2023  4:29 PM CDT Back to Top      See 8/1/23 telephone encounter.     Jay HERNANDEZ RN        Patient Communication     Append Comments   Seen Back to Top      Dr. Carito Mccabe reviewed your imaging and stated that the aorta is it stable in size and valve is well functioning. She will review further with you at your upcoming visit in October but if you have any co ...   Written by Paige Loco RN on 8/9/2023  1:18 PM CDT View Full Comments  Seen by patient Robson Gutierrez on 8/10/2023  6:15 PM        Stress Tracings -- Encounter Level:    Stress Tracings: None found at the encounter level.  Stress Tracings -- Order Level:    Stress Tracings: None found at the order level.  Echo EF Measurements    Echo EF   LVEF 60-65%              ECG Link    No scanned documents     All Reviewers List    Eusebia Castaneda RN on 8/10/2023  8:13 AM     Encounter    View Encounter            Lab Information    CARDIOLOGY RESULTS          Signed    Electronically signed by Jerson De La Torre MD on 8/1/23 at 1622 CDT     Additional Information    Specimen ID Bill Type Client ID   [id number]              Specimen Date Taken Specimen Time Taken Specimen Received Date Specimen Received Time Result Date Result Time   Aug 1, 2023  1:59 PM   Aug 1, 2023  1:59 PM     Recipient List for Orders  Sent From To Cc'd Forwarded To Results   8/1/2023  4:24 PM Fei Barajas In Aretha Arzate MD   Echocardiogram Complete [[id number]]

## 2023-11-08 NOTE — TELEPHONE ENCOUNTER
Aretha Arzate MD  You15 hours ago (5:39 PM)     CF  Would avoid epinephrine only if it carries risk of elevating heart rate and blood pressure significantly.    Dr. Arzate   Tried to call [facility] but no answer. Left VM to call back with team 4 direct number.

## 2024-03-12 DIAGNOSIS — I71.21 ANEURYSM OF ASCENDING AORTA WITHOUT RUPTURE (H): Primary | ICD-10-CM

## 2024-04-02 ENCOUNTER — HOSPITAL ENCOUNTER (OUTPATIENT)
Facility: CLINIC | Age: 59
End: 2024-04-02
Payer: COMMERCIAL

## 2024-04-02 DIAGNOSIS — I71.21 ANEURYSM OF ASCENDING AORTA WITHOUT RUPTURE (H): ICD-10-CM

## 2024-04-10 ENCOUNTER — VIRTUAL VISIT (OUTPATIENT)
Dept: CARDIOLOGY | Facility: CLINIC | Age: 59
End: 2024-04-10
Payer: COMMERCIAL

## 2024-04-10 VITALS
WEIGHT: 175 LBS | SYSTOLIC BLOOD PRESSURE: 110 MMHG | HEART RATE: 70 BPM | BODY MASS INDEX: 23.7 KG/M2 | DIASTOLIC BLOOD PRESSURE: 80 MMHG | HEIGHT: 72 IN

## 2024-04-10 DIAGNOSIS — I71.21 ANEURYSM OF ASCENDING AORTA WITHOUT RUPTURE (H): Primary | ICD-10-CM

## 2024-04-10 DIAGNOSIS — Q23.81 BICUSPID AORTIC VALVE: ICD-10-CM

## 2024-04-10 DIAGNOSIS — I10 PRIMARY HYPERTENSION: ICD-10-CM

## 2024-04-10 PROCEDURE — 99214 OFFICE O/P EST MOD 30 MIN: CPT | Mod: 95

## 2024-04-10 RX ORDER — CHLORHEXIDINE GLUCONATE ORAL RINSE 1.2 MG/ML
10 SOLUTION DENTAL ONCE
Status: CANCELLED | OUTPATIENT
Start: 2024-04-10 | End: 2024-04-10

## 2024-04-10 RX ORDER — ASPIRIN 81 MG/1
162 TABLET, CHEWABLE ORAL
Status: CANCELLED | OUTPATIENT
Start: 2024-04-10

## 2024-04-10 RX ORDER — CHLORTHALIDONE 25 MG/1
1 TABLET ORAL DAILY
Status: ON HOLD | COMMUNITY
Start: 2024-02-27 | End: 2024-06-03

## 2024-04-10 RX ORDER — FAMOTIDINE 20 MG/1
20 TABLET, FILM COATED ORAL
Status: CANCELLED | OUTPATIENT
Start: 2024-04-10

## 2024-04-10 RX ORDER — CEFAZOLIN SODIUM 2 G/100ML
2 INJECTION, SOLUTION INTRAVENOUS
Status: CANCELLED | OUTPATIENT
Start: 2024-04-10

## 2024-04-10 RX ORDER — LIDOCAINE 40 MG/G
CREAM TOPICAL
Status: CANCELLED | OUTPATIENT
Start: 2024-04-10

## 2024-04-10 RX ORDER — ASPIRIN 81 MG/1
81 TABLET, CHEWABLE ORAL
Status: CANCELLED | OUTPATIENT
Start: 2024-04-10

## 2024-04-10 RX ORDER — VANCOMYCIN HYDROCHLORIDE 1 G/200ML
1000 INJECTION, SOLUTION INTRAVENOUS
Status: CANCELLED | OUTPATIENT
Start: 2024-04-10

## 2024-04-10 RX ORDER — CEFAZOLIN SODIUM 2 G/100ML
2 INJECTION, SOLUTION INTRAVENOUS SEE ADMIN INSTRUCTIONS
Status: CANCELLED | OUTPATIENT
Start: 2024-04-10

## 2024-04-10 NOTE — LETTER
4/10/2024    Physician No Ref-Primary  No address on file    RE: Robson Gutierrez       Dear Colleague,     I had the pleasure of seeing Robson Gutierrez in the [facility].  Robson is a 58 year old who is being evaluated via a billable video visit.    How would you like to obtain your AVS? MyChart  If the video visit is dropped, the invitation should be resent by: Text to cell phone: [phone]  Will anyone else be joining your video visit? No      Review Of Systems  Respiratory: NEGATIVE  Cardiovascular:NEGATIVE     Vitals - Patient Reported  Systolic (Patient Reported): 110  Diastolic (Patient Reported): 80  Pulse (Patient Reported): 70    JANIE Ji    Telephone number of patient: [phone]      Video-Visit Details    Type of service:  Video Visit [facility]  Originating Location (pt. Location): Home    Distant Location (provider location):  On-site  Platform used for Video Visit: [facility]        Cardiology Virtual Visit Progress Note    Service Date: April 10, 2024  Primary Cardiologist: Dr. Arzate   Reason for visit: H&P for angiogram     Mr. Robson Gutierrez is a 58 year old male who is being evaluated via a billable video visit.    Patient has given verbal consent for virtual visit?  Yes    History of Present Illness    He is a very pleasant 58 year old male with a past medical history of HTN, bicuspid aortic valve and TAA.            Assessment and Impression:     Ascending aortic aneurysm measuring 4.9 cm on MRA (8/2023)  Scheduled for an ascending aorta replacement on 5/13 with Dr. Torres   Scheduled for left heart cath on 4/16    Bicuspid aortic valve     HTN  On atenolol 50 mg daily          Recommendations and Plan:     Coronary angiogram scheduled for 4/16  Risks and benefits of coronary angiogram discussed today including, bleeding, bruising, infection, allergic reaction, kidney damage (including need for dialysis), stroke, heart attack, vascular damage, emergency open heart  surgery, up to and including death.  Patient indicates understanding and is agreeable to proceed.     ________________________________________________________    Thank you for the opportunity to participate in this pleasant patient's care.   We would be happy to see him sooner if needed for any concerns in the meantime.     Renetta Jewell PA-C  Physician Assistant   [facility]      Provider location: [facility]   Patient location: Home  Total time on phone call: 10 minutes     Today's clinic visit entailed:  Review of the result(s) of each unique test - MRA chest   25 minutes spent by me on the date of the encounter doing chart review, history and exam, documentation and further activities per the note  Provider  Link to [facility] Help Grid     The level of medical decision making during this visit was of moderate complexity.      Orders this Visit:  No orders of the defined types were placed in this encounter.    Orders Placed This Encounter   Medications    chlorthalidone (HYGROTON) 25 MG tablet     Sig: Take 1 tablet by mouth daily     There are no discontinued medications.  No diagnosis found.    CURRENT MEDICATIONS:  Current Outpatient Medications   Medication Sig Dispense Refill    atenolol (TENORMIN) 50 MG tablet Take 1 tablet (50 mg) by mouth daily 90 tablet 2    chlorthalidone (HYGROTON) 25 MG tablet Take 1 tablet by mouth daily      dorzolamide-timolol (COSOPT) 22.3-6.8 MG/ML ophthalmic solution Place 1 drop into both eyes 2 times daily      latanoprost (XALATAN) 0.005 % ophthalmic solution Place 1 drop into both eyes 2 times daily         ALLERGIES  No Known Allergies    PAST MEDICAL, SURGICAL, FAMILY HISTORY:  History was reviewed and updated as needed, see medical record.    SOCIAL HISTORY:  Social History     Socioeconomic History    Marital status:      Spouse name: Not on file    Number of children: Not on file    Years of education: Not on file    Highest education  level: Not on file   Occupational History    Not on file   Tobacco Use    Smoking status: Never    Smokeless tobacco: Never   Substance and Sexual Activity    Alcohol use: Yes     Comment: ocassional     Drug use: No    Sexual activity: Not on file   Other Topics Concern    Parent/sibling w/ CABG, MI or angioplasty before 65F 55M? Not Asked   Social History Narrative    Not on file     Social Determinants of Health     Financial Resource Strain: Not At Risk (2/27/2024)    Received from [employer]    Financial Resource Strain     Is it hard for you to pay for the very basics like food, housing, medical care or heating?: No   Food Insecurity: Not At Risk (2/27/2024)    Received from [employer]    Food Insecurity     Does your food run out before you have the money to buy more?: No   Transportation Needs: Not At Risk (2/27/2024)    Received from [employer]    Transportation Needs     Does a lack of transportation keep you from your medical appointments or from getting your medications?: No   Physical Activity: Not on file   Stress: Not on file   Social Connections: Not on file   Interpersonal Safety: Not on file   Housing Stability: Not on file       Review of Systems:  Respiratory: No shortness of breath, dyspnea on exertion, cough, or hemoptysis  Cardiovascular: negative  Neurologic: negative    Patient Reported Vitals:   BP: 110/50  Pulse: NA  Weight: 175 lb    PSYCH: Alert and oriented times 3; coherent speech, normal   rate and volume, able to articulate logical thoughts, able   to abstract reason, no tangential thoughts, no hallucinations   or delusions. his affect is normal  RESP: No cough, no audible wheezing, able to talk in full sentences    Remainder of the comprehensive physical exam is unable to be completed due to today's visit being completed via telephone call.    /80   Pulse 70   Ht 1.829 m (6')   Wt 79.4 kg (175 lb)   BMI 23.73  "kg/m     Wt Readings from Last 4 Encounters:   04/10/24 79.4 kg (175 lb)   10/30/23 79.6 kg (175 lb 8 oz)   10/24/23 79.2 kg (174 lb 9.6 oz)   08/01/22 80.7 kg (178 lb)     Recent Lab Results:  LIPID RESULTS:  Lab Results   Component Value Date    CHOL 162 06/11/2019    HDL 58 06/11/2019    LDL 76 06/11/2019    TRIG 166 (H) 01/07/2022    TRIG 138 06/11/2019     LIVER ENZYME RESULTS:  Lab Results   Component Value Date    AST 17 05/24/2019    ALT 39 05/24/2019     CBC RESULTS:  Lab Results   Component Value Date    WBC 8.0 05/03/2019    RBC 4.84 05/03/2019    HGB 14.2 05/03/2019    HCT 45.1 05/03/2019    MCV 93 05/03/2019    MCH 29.3 05/03/2019    MCHC 31.5 05/03/2019    RDW 12.3 05/03/2019     05/03/2019     BMP RESULTS:  Lab Results   Component Value Date     05/24/2019    POTASSIUM 4.4 05/24/2019    CHLORIDE 103 01/07/2022    CHLORIDE 105 05/24/2019    CO2 28 05/24/2019    ANIONGAP 5 05/24/2019    GLC 91 05/24/2019    BUN 20 05/24/2019    CR 0.88 05/24/2019    GFRESTIMATED >90 05/24/2019    GFRESTBLACK >90 05/24/2019    HALEY 9.5 05/24/2019      A1C RESULTS:  No results found for: \"A1C\"  INR RESULTS:  Lab Results   Component Value Date    INR 1.06 04/19/2018       CC  No referring provider defined for this encounter.      Thank you for allowing me to participate in the care of your patient.      Sincerely,     Renetta Jewell PA-C     [facility]  "

## 2024-04-10 NOTE — PROGRESS NOTES
Robson is a 58 year old who is being evaluated via a billable video visit.    How would you like to obtain your AVS? MyChart  If the video visit is dropped, the invitation should be resent by: Text to cell phone: [phone]  Will anyone else be joining your video visit? No      Review Of Systems  Respiratory: NEGATIVE  Cardiovascular:NEGATIVE     Vitals - Patient Reported  Systolic (Patient Reported): 110  Diastolic (Patient Reported): 80  Pulse (Patient Reported): 70    JANIE Ji    Telephone number of patient: [phone]      Video-Visit Details    Type of service:  Video Visit [facility]  Originating Location (pt. Location): Home    Distant Location (provider location):  On-site  Platform used for Video Visit: [facility]

## 2024-04-10 NOTE — PROGRESS NOTES
Cardiology Virtual Visit Progress Note    Service Date: April 10, 2024  Primary Cardiologist: Dr. Arzate   Reason for visit: H&P for angiogram     Mr. Robson Gutierrez is a 58 year old male who is being evaluated via a billable video visit.    Patient has given verbal consent for virtual visit?  Yes    History of Present Illness    He is a very pleasant 58 year old male with a past medical history of HTN, bicuspid aortic valve and TAA.            Assessment and Impression:     Ascending aortic aneurysm measuring 4.9 cm on MRA (8/2023)  Scheduled for an ascending aorta replacement on 5/13 with Dr. Torres   Scheduled for left heart cath on 4/16    Bicuspid aortic valve     HTN  On atenolol 50 mg daily          Recommendations and Plan:     Coronary angiogram scheduled for 4/16  Risks and benefits of coronary angiogram discussed today including, bleeding, bruising, infection, allergic reaction, kidney damage (including need for dialysis), stroke, heart attack, vascular damage, emergency open heart surgery, up to and including death.  Patient indicates understanding and is agreeable to proceed.     ________________________________________________________    Thank you for the opportunity to participate in this pleasant patient's care.   We would be happy to see him sooner if needed for any concerns in the meantime.     Renetta Jewell PA-C  Physician Assistant   [facility]      Provider location: [facility]   Patient location: Home  Total time on phone call: 10 minutes     Today's clinic visit entailed:  Review of the result(s) of each unique test - MRA chest   25 minutes spent by me on the date of the encounter doing chart review, history and exam, documentation and further activities per the note  Provider  Link to [facility] Help Grid     The level of medical decision making during this visit was of moderate complexity.      Orders this Visit:  No orders of the defined types were placed in  this encounter.    Orders Placed This Encounter   Medications    chlorthalidone (HYGROTON) 25 MG tablet     Sig: Take 1 tablet by mouth daily     There are no discontinued medications.  No diagnosis found.    CURRENT MEDICATIONS:  Current Outpatient Medications   Medication Sig Dispense Refill    atenolol (TENORMIN) 50 MG tablet Take 1 tablet (50 mg) by mouth daily 90 tablet 2    chlorthalidone (HYGROTON) 25 MG tablet Take 1 tablet by mouth daily      dorzolamide-timolol (COSOPT) 22.3-6.8 MG/ML ophthalmic solution Place 1 drop into both eyes 2 times daily      latanoprost (XALATAN) 0.005 % ophthalmic solution Place 1 drop into both eyes 2 times daily         ALLERGIES  No Known Allergies    PAST MEDICAL, SURGICAL, FAMILY HISTORY:  History was reviewed and updated as needed, see medical record.    SOCIAL HISTORY:  Social History     Socioeconomic History    Marital status:      Spouse name: Not on file    Number of children: Not on file    Years of education: Not on file    Highest education level: Not on file   Occupational History    Not on file   Tobacco Use    Smoking status: Never    Smokeless tobacco: Never   Substance and Sexual Activity    Alcohol use: Yes     Comment: ocassional     Drug use: No    Sexual activity: Not on file   Other Topics Concern    Parent/sibling w/ CABG, MI or angioplasty before 65F 55M? Not Asked   Social History Narrative    Not on file     Social Determinants of Health     Financial Resource Strain: Not At Risk (2/27/2024)    Received from [employer] [facility]    Financial Resource Strain     Is it hard for you to pay for the very basics like food, housing, medical care or heating?: No   Food Insecurity: Not At Risk (2/27/2024)    Received from [employer] [facility]    Food Insecurity     Does your food run out before you have the money to buy more?: No   Transportation Needs: Not At Risk (2/27/2024)    Received from [employer] [facility]     Transportation Needs     Does a lack of transportation keep you from your medical appointments or from getting your medications?: No   Physical Activity: Not on file   Stress: Not on file   Social Connections: Not on file   Interpersonal Safety: Not on file   Housing Stability: Not on file       Review of Systems:  Respiratory: No shortness of breath, dyspnea on exertion, cough, or hemoptysis  Cardiovascular: negative  Neurologic: negative    Patient Reported Vitals:   BP: 110/50  Pulse: NA  Weight: 175 lb    PSYCH: Alert and oriented times 3; coherent speech, normal   rate and volume, able to articulate logical thoughts, able   to abstract reason, no tangential thoughts, no hallucinations   or delusions. his affect is normal  RESP: No cough, no audible wheezing, able to talk in full sentences    Remainder of the comprehensive physical exam is unable to be completed due to today's visit being completed via telephone call.    /80   Pulse 70   Ht 1.829 m (6')   Wt 79.4 kg (175 lb)   BMI 23.73 kg/m     Wt Readings from Last 4 Encounters:   04/10/24 79.4 kg (175 lb)   10/30/23 79.6 kg (175 lb 8 oz)   10/24/23 79.2 kg (174 lb 9.6 oz)   08/01/22 80.7 kg (178 lb)     Recent Lab Results:  LIPID RESULTS:  Lab Results   Component Value Date    CHOL 162 06/11/2019    HDL 58 06/11/2019    LDL 76 06/11/2019    TRIG 166 (H) 01/07/2022    TRIG 138 06/11/2019     LIVER ENZYME RESULTS:  Lab Results   Component Value Date    AST 17 05/24/2019    ALT 39 05/24/2019     CBC RESULTS:  Lab Results   Component Value Date    WBC 8.0 05/03/2019    RBC 4.84 05/03/2019    HGB 14.2 05/03/2019    HCT 45.1 05/03/2019    MCV 93 05/03/2019    MCH 29.3 05/03/2019    MCHC 31.5 05/03/2019    RDW 12.3 05/03/2019     05/03/2019     BMP RESULTS:  Lab Results   Component Value Date     05/24/2019    POTASSIUM 4.4 05/24/2019    CHLORIDE 103 01/07/2022    CHLORIDE 105 05/24/2019    CO2 28 05/24/2019    ANIONGAP 5 05/24/2019    GLC 91  "05/24/2019    BUN 20 05/24/2019    CR 0.88 05/24/2019    GFRESTIMATED >90 05/24/2019    GFRESTBLACK >90 05/24/2019    HALEY 9.5 05/24/2019      A1C RESULTS:  No results found for: \"A1C\"  INR RESULTS:  Lab Results   Component Value Date    INR 1.06 04/19/2018       CC  No referring provider defined for this encounter.    "

## 2024-04-11 ENCOUNTER — DOCUMENTATION ONLY (OUTPATIENT)
Dept: OTHER | Facility: CLINIC | Age: 59
End: 2024-04-11
Payer: COMMERCIAL

## 2024-04-11 NOTE — PROGRESS NOTES
Pre op education letter sent to pt via my chart. Pt notified and requested review and to call with any questions.

## 2024-04-15 ENCOUNTER — TELEPHONE (OUTPATIENT)
Dept: CARDIOLOGY | Facility: CLINIC | Age: 59
End: 2024-04-15
Payer: COMMERCIAL

## 2024-04-15 DIAGNOSIS — I71.21 ANEURYSM OF ASCENDING AORTA WITHOUT RUPTURE (H): Primary | ICD-10-CM

## 2024-04-15 RX ORDER — ASPIRIN 325 MG
325 TABLET ORAL ONCE
Status: CANCELLED | OUTPATIENT
Start: 2024-04-15 | End: 2024-04-15

## 2024-04-15 RX ORDER — LIDOCAINE 40 MG/G
CREAM TOPICAL
Status: CANCELLED | OUTPATIENT
Start: 2024-04-15

## 2024-04-15 RX ORDER — POTASSIUM CHLORIDE 1500 MG/1
20 TABLET, EXTENDED RELEASE ORAL
Status: CANCELLED | OUTPATIENT
Start: 2024-04-15

## 2024-04-15 RX ORDER — SODIUM CHLORIDE 9 MG/ML
INJECTION, SOLUTION INTRAVENOUS CONTINUOUS
Status: CANCELLED | OUTPATIENT
Start: 2024-04-15

## 2024-04-15 RX ORDER — ASPIRIN 81 MG/1
243 TABLET, CHEWABLE ORAL ONCE
Status: CANCELLED | OUTPATIENT
Start: 2024-04-15

## 2024-04-15 NOTE — TELEPHONE ENCOUNTER
Tried to call patient to review instructions below. No answer. Left VM to call team 4 back at [phone].     Coronary angiogram/PCI/Right Heart Cath prep instructions.     Patient is scheduled for a Coronary Angiogram at [facility] - [address], [place] [address] - Main Entrance of the Hospital on 4/16/24.  Check in time is at 8:30 am and procedure to follow.    Patient instructed to remain NPO for solid foods 8 hours prior to arrival and may have clear liquids up to 2 hours prior to arrival.    Patient Patient does not require extra fluids prior to procedure.    Patient is not diabetic.    Patient is not on anticoagulation.    Patient is not on diuretics.     Patient is not currently taking ASA and has been advised to take one 325 mg tablet the day prior and morning of the procedure.    Pt is not on a SGLT2 inhibitor.    Pt is not on a GLP-1 Agonist    Patient advised to take their other daily medications the morning of the procedure with small sips of water.     Verified patient does not have a contrast allergy.    Verified patient has someone available to drive them home from the hospital and can stay with them for 24 hours after the procedure.     Patient advised to notify care team with any new COVID like symptoms prior to procedure. Day of procedure phone number: [facility]a at [phone]    Patient is aware of visitor policy.    Patient expresses understanding of above instructions and denies further questions at this time.      Paige Loco RN  [facility]

## 2024-04-16 ENCOUNTER — APPOINTMENT (OUTPATIENT)
Dept: ULTRASOUND IMAGING | Facility: CLINIC | Age: 59
End: 2024-04-16
Attending: SURGERY
Payer: COMMERCIAL

## 2024-04-16 ENCOUNTER — APPOINTMENT (OUTPATIENT)
Dept: GENERAL RADIOLOGY | Facility: CLINIC | Age: 59
End: 2024-04-16
Attending: SURGERY
Payer: COMMERCIAL

## 2024-04-16 ENCOUNTER — HOSPITAL ENCOUNTER (OUTPATIENT)
Facility: CLINIC | Age: 59
Discharge: HOME | End: 2024-04-16
Admitting: INTERNAL MEDICINE
Payer: COMMERCIAL

## 2024-04-16 VITALS
SYSTOLIC BLOOD PRESSURE: 102 MMHG | OXYGEN SATURATION: 100 % | BODY MASS INDEX: 23.3 KG/M2 | RESPIRATION RATE: 16 BRPM | HEART RATE: 61 BPM | WEIGHT: 172 LBS | DIASTOLIC BLOOD PRESSURE: 71 MMHG | HEIGHT: 72 IN | TEMPERATURE: 98 F

## 2024-04-16 DIAGNOSIS — I71.21 ANEURYSM OF ASCENDING AORTA WITHOUT RUPTURE (H): ICD-10-CM

## 2024-04-16 PROBLEM — Z98.890 STATUS POST CORONARY ANGIOGRAM: Status: ACTIVE | Noted: 2024-04-16

## 2024-04-16 LAB
ABO/RH(D): NORMAL
ALBUMIN SERPL BCG-MCNC: 4.2 G/DL (ref 3.5–5.2)
ALBUMIN UR-MCNC: NEGATIVE MG/DL
ALP SERPL-CCNC: 106 U/L (ref 40–150)
ALT SERPL W P-5'-P-CCNC: 15 U/L (ref 0–70)
ANION GAP SERPL CALCULATED.3IONS-SCNC: 12 MMOL/L (ref 7–15)
ANTIBODY SCREEN: NEGATIVE
APPEARANCE UR: CLEAR
AST SERPL W P-5'-P-CCNC: 20 U/L (ref 0–45)
BILIRUB SERPL-MCNC: 0.8 MG/DL
BILIRUB UR QL STRIP: NEGATIVE
BUN SERPL-MCNC: 17.4 MG/DL (ref 8–23)
CALCIUM SERPL-MCNC: 9.6 MG/DL (ref 8.8–10.2)
CHLORIDE SERPL-SCNC: 100 MMOL/L (ref 98–107)
COLOR UR AUTO: ABNORMAL
CREAT SERPL-MCNC: 0.91 MG/DL (ref 0.67–1.17)
DEPRECATED HCO3 PLAS-SCNC: 26 MMOL/L (ref 22–29)
EGFRCR SERPLBLD CKD-EPI 2021: >90 ML/MIN/1.73M2
ERYTHROCYTE [DISTWIDTH] IN BLOOD BY AUTOMATED COUNT: 12.4 % (ref 10–15)
GLUCOSE SERPL-MCNC: 116 MG/DL (ref 70–99)
GLUCOSE UR STRIP-MCNC: NEGATIVE MG/DL
HBA1C MFR BLD: 5.5 %
HCT VFR BLD AUTO: 43.4 % (ref 40–53)
HGB BLD-MCNC: 14.9 G/DL (ref 13.3–17.7)
HGB UR QL STRIP: ABNORMAL
INR PPP: 1.07 (ref 0.85–1.15)
KETONES UR STRIP-MCNC: NEGATIVE MG/DL
LEUKOCYTE ESTERASE UR QL STRIP: NEGATIVE
MCH RBC QN AUTO: 30 PG (ref 26.5–33)
MCHC RBC AUTO-ENTMCNC: 34.3 G/DL (ref 31.5–36.5)
MCV RBC AUTO: 87 FL (ref 78–100)
MUCOUS THREADS #/AREA URNS LPF: PRESENT /LPF
NITRATE UR QL: NEGATIVE
PH UR STRIP: 7 [PH] (ref 5–7)
PLATELET # BLD AUTO: 220 10E3/UL (ref 150–450)
POTASSIUM SERPL-SCNC: 3.9 MMOL/L (ref 3.4–5.3)
PROT SERPL-MCNC: 7.9 G/DL (ref 6.4–8.3)
RBC # BLD AUTO: 4.97 10E6/UL (ref 4.4–5.9)
RBC URINE: 2 /HPF
SODIUM SERPL-SCNC: 138 MMOL/L (ref 135–145)
SP GR UR STRIP: 1.02 (ref 1–1.03)
SPECIMEN EXPIRATION DATE: NORMAL
UROBILINOGEN UR STRIP-MCNC: NORMAL MG/DL
WBC # BLD AUTO: 11.4 10E3/UL (ref 4–11)
WBC URINE: <1 /HPF

## 2024-04-16 PROCEDURE — 250N000013 HC RX MED GY IP 250 OP 250 PS 637: Performed by: INTERNAL MEDICINE

## 2024-04-16 PROCEDURE — 85610 PROTHROMBIN TIME: CPT | Performed by: INTERNAL MEDICINE

## 2024-04-16 PROCEDURE — 99152 MOD SED SAME PHYS/QHP 5/>YRS: CPT | Performed by: INTERNAL MEDICINE

## 2024-04-16 PROCEDURE — 85027 COMPLETE CBC AUTOMATED: CPT | Performed by: INTERNAL MEDICINE

## 2024-04-16 PROCEDURE — 93880 EXTRACRANIAL BILAT STUDY: CPT

## 2024-04-16 PROCEDURE — 93454 CORONARY ARTERY ANGIO S&I: CPT | Mod: 26 | Performed by: INTERNAL MEDICINE

## 2024-04-16 PROCEDURE — 86900 BLOOD TYPING SEROLOGIC ABO: CPT | Performed by: SURGERY

## 2024-04-16 PROCEDURE — 71046 X-RAY EXAM CHEST 2 VIEWS: CPT

## 2024-04-16 PROCEDURE — 272N000001 HC OR GENERAL SUPPLY STERILE: Performed by: INTERNAL MEDICINE

## 2024-04-16 PROCEDURE — C1760 CLOSURE DEV, VASC: HCPCS | Performed by: INTERNAL MEDICINE

## 2024-04-16 PROCEDURE — 83036 HEMOGLOBIN GLYCOSYLATED A1C: CPT | Performed by: SURGERY

## 2024-04-16 PROCEDURE — 93454 CORONARY ARTERY ANGIO S&I: CPT | Performed by: INTERNAL MEDICINE

## 2024-04-16 PROCEDURE — 82374 ASSAY BLOOD CARBON DIOXIDE: CPT | Performed by: SURGERY

## 2024-04-16 PROCEDURE — 82040 ASSAY OF SERUM ALBUMIN: CPT | Performed by: SURGERY

## 2024-04-16 PROCEDURE — 999N000071 HC STATISTIC HEART CATH LAB OR EP LAB

## 2024-04-16 PROCEDURE — 258N000003 HC RX IP 258 OP 636: Performed by: INTERNAL MEDICINE

## 2024-04-16 PROCEDURE — 250N000011 HC RX IP 250 OP 636: Performed by: INTERNAL MEDICINE

## 2024-04-16 PROCEDURE — 250N000009 HC RX 250: Performed by: INTERNAL MEDICINE

## 2024-04-16 PROCEDURE — 36415 COLL VENOUS BLD VENIPUNCTURE: CPT | Performed by: INTERNAL MEDICINE

## 2024-04-16 PROCEDURE — C1894 INTRO/SHEATH, NON-LASER: HCPCS | Performed by: INTERNAL MEDICINE

## 2024-04-16 PROCEDURE — 999N000184 HC STATISTIC TELEMETRY

## 2024-04-16 PROCEDURE — 81001 URINALYSIS AUTO W/SCOPE: CPT | Performed by: SURGERY

## 2024-04-16 RX ORDER — ATROPINE SULFATE 0.1 MG/ML
0.5 INJECTION INTRAVENOUS
Status: DISCONTINUED | OUTPATIENT
Start: 2024-04-16 | End: 2024-04-16 | Stop reason: HOSPADM

## 2024-04-16 RX ORDER — NALOXONE HYDROCHLORIDE 0.4 MG/ML
0.4 INJECTION, SOLUTION INTRAMUSCULAR; INTRAVENOUS; SUBCUTANEOUS
Status: DISCONTINUED | OUTPATIENT
Start: 2024-04-16 | End: 2024-04-16 | Stop reason: HOSPADM

## 2024-04-16 RX ORDER — SODIUM CHLORIDE 9 MG/ML
INJECTION, SOLUTION INTRAVENOUS CONTINUOUS
Status: DISCONTINUED | OUTPATIENT
Start: 2024-04-16 | End: 2024-04-16 | Stop reason: HOSPADM

## 2024-04-16 RX ORDER — IOPAMIDOL 755 MG/ML
INJECTION, SOLUTION INTRAVASCULAR
Status: DISCONTINUED | OUTPATIENT
Start: 2024-04-16 | End: 2024-04-16 | Stop reason: HOSPADM

## 2024-04-16 RX ORDER — OXYCODONE HYDROCHLORIDE 5 MG/1
10 TABLET ORAL EVERY 4 HOURS PRN
Status: DISCONTINUED | OUTPATIENT
Start: 2024-04-16 | End: 2024-04-16 | Stop reason: HOSPADM

## 2024-04-16 RX ORDER — FENTANYL CITRATE 50 UG/ML
INJECTION, SOLUTION INTRAMUSCULAR; INTRAVENOUS
Status: DISCONTINUED | OUTPATIENT
Start: 2024-04-16 | End: 2024-04-16 | Stop reason: HOSPADM

## 2024-04-16 RX ORDER — POTASSIUM CHLORIDE 1500 MG/1
20 TABLET, EXTENDED RELEASE ORAL
Status: COMPLETED | OUTPATIENT
Start: 2024-04-16 | End: 2024-04-16

## 2024-04-16 RX ORDER — FLUMAZENIL 0.1 MG/ML
0.2 INJECTION, SOLUTION INTRAVENOUS
Status: DISCONTINUED | OUTPATIENT
Start: 2024-04-16 | End: 2024-04-16 | Stop reason: HOSPADM

## 2024-04-16 RX ORDER — LIDOCAINE 40 MG/G
CREAM TOPICAL
Status: DISCONTINUED | OUTPATIENT
Start: 2024-04-16 | End: 2024-04-16 | Stop reason: HOSPADM

## 2024-04-16 RX ORDER — ASPIRIN 81 MG/1
324 TABLET ORAL ONCE
COMMUNITY
End: 2024-06-12 | Stop reason: DRUGHIGH

## 2024-04-16 RX ORDER — ASPIRIN 81 MG/1
243 TABLET, CHEWABLE ORAL ONCE
Status: COMPLETED | OUTPATIENT
Start: 2024-04-16 | End: 2024-04-16

## 2024-04-16 RX ORDER — NALOXONE HYDROCHLORIDE 0.4 MG/ML
0.2 INJECTION, SOLUTION INTRAMUSCULAR; INTRAVENOUS; SUBCUTANEOUS
Status: DISCONTINUED | OUTPATIENT
Start: 2024-04-16 | End: 2024-04-16 | Stop reason: HOSPADM

## 2024-04-16 RX ORDER — ACETAMINOPHEN 325 MG/1
650 TABLET ORAL EVERY 4 HOURS PRN
Status: DISCONTINUED | OUTPATIENT
Start: 2024-04-16 | End: 2024-04-16 | Stop reason: HOSPADM

## 2024-04-16 RX ORDER — OXYCODONE HYDROCHLORIDE 5 MG/1
5 TABLET ORAL EVERY 4 HOURS PRN
Status: DISCONTINUED | OUTPATIENT
Start: 2024-04-16 | End: 2024-04-16 | Stop reason: HOSPADM

## 2024-04-16 RX ORDER — HEPARIN SODIUM 10000 [USP'U]/100ML
100-1000 INJECTION, SOLUTION INTRAVENOUS CONTINUOUS PRN
Status: DISCONTINUED | OUTPATIENT
Start: 2024-04-16 | End: 2024-04-16 | Stop reason: HOSPADM

## 2024-04-16 RX ORDER — ASPIRIN 325 MG
325 TABLET ORAL ONCE
Status: COMPLETED | OUTPATIENT
Start: 2024-04-16 | End: 2024-04-16

## 2024-04-16 RX ORDER — FENTANYL CITRATE 50 UG/ML
25 INJECTION, SOLUTION INTRAMUSCULAR; INTRAVENOUS
Status: DISCONTINUED | OUTPATIENT
Start: 2024-04-16 | End: 2024-04-16 | Stop reason: HOSPADM

## 2024-04-16 RX ADMIN — POTASSIUM CHLORIDE 20 MEQ: 1500 TABLET, EXTENDED RELEASE ORAL at 09:54

## 2024-04-16 RX ADMIN — SODIUM CHLORIDE: 9 INJECTION, SOLUTION INTRAVENOUS at 09:08

## 2024-04-16 RX ADMIN — SODIUM CHLORIDE: 9 INJECTION, SOLUTION INTRAVENOUS at 10:39

## 2024-04-16 ASSESSMENT — ACTIVITIES OF DAILY LIVING (ADL)
ADLS_ACUITY_SCORE: 35

## 2024-04-16 NOTE — PRE-PROCEDURE
GENERAL PRE-PROCEDURE:   Procedure:  Coronary angiogram  Date/Time:  4/16/2024 10:14 AM    Verbal consent obtained?: Yes    Written consent obtained?: Yes    Risks and benefits: Risks, benefits and alternatives were discussed    Consent given by:  Patient  Patient states understanding of procedure being performed: Yes    Patient's understanding of procedure matches consent: Yes    Procedure consent matches procedure scheduled: Yes    Expected level of sedation:  Moderate  Appropriately NPO:  Yes  ASA Class:  3  Mallampati  :  Grade 3- soft palate visible, posterior pharyngeal wall not visible  Lungs:  Lungs clear with good breath sounds bilaterally  Heart:  Normal heart sounds and rate  History & Physical reviewed:  History and physical reviewed and no updates needed  Statement of review:  I have reviewed the lab findings, diagnostic data, medications, and the plan for sedation

## 2024-04-16 NOTE — PROGRESS NOTES
Care Suites Admission Nursing Note    Patient Information  Name: Robson Gutierrez  Age: 58 year old  Reason for admission: Heart Cath  Care Suites arrival time: 0830    Visitor Information  Name: Evelyne (wife)     Patient Admission/Assessment   Pre-procedure assessment complete: Yes  If abnormal assessment/labs, provider notified: No. Potassium protocol followed and patient given potassium supplement  NPO: Yes  Medications held per instructions/orders: Yes  Consent: deferred  Patient oriented to room: Yes  Education/questions answered: Yes  Plan/other: Proceed as scheduled    Discharge Planning  Discharge name/phone number: Evelyne (wife) [phone]  Overnight post sedation caregiver: Evelyne  Discharge location: home    Lala Alfaro RN

## 2024-04-16 NOTE — PROGRESS NOTES
Care Suites Post Procedure Note    Patient Information  Name: Robson Gutierrez  Age: 58 year old    Post Procedure  Time patient returned to Care Suites: 1040  Concerns/abnormal assessment: none  If abnormal assessment, provider notified: N/A  Plan/Other: 2 hours of bedrest    Shanice Morfin RN

## 2024-04-16 NOTE — DISCHARGE INSTRUCTIONS
Cardiac Angiogram Discharge Instructions - Femoral    After you go home:    Have an adult stay with you until tomorrow.  Drink extra fluids for 2 days.  You may resume your normal diet.  No smoking       For 24 hours - due to the sedation you received:  Relax and take it easy.  Do NOT make any important or legal decisions.  Do NOT drive or operate machines at home or at work.  Do NOT drink alcohol.    Care of Groin Puncture Site:    For the first 24 hrs - check the puncture site every 1-2 hours while awake.  For 2 days, when you cough, sneeze, laugh or move your bowels, hold your hand over the puncture site and press firmly.  Remove the bandaid after 24 hours. If there is minor oozing, apply another bandaid and remove it after 12 hours.  It is normal to have a small bruise or pea size lump at the site.  You may shower tomorrow. Do NOT take a bath, or use a hot tub or pool for at least 3 days. Do NOT scrub the site. Do not use lotion or powder near the puncture site.    Activity:            For 2 days:  No stooping or squatting  Do NOT do any heavy activity such as exercise, lifting, or straining.   No housework, yard work or any activity that make you sweat  Do NOT lift more than 10 pounds    Bleeding:    If you start bleeding from the site in your groin, lie down flat and press firmly on/above the site for 10 minutes.   Once bleeding stops, lay flat for 2 hours.   Call [facility] as soon as you can.       Call 911 right away if you have heavy bleeding or bleeding that does not stop.      Medicines:    If you are taking an antiplatelet medication such as Plavix, Brilinta or Effient, do not stop taking it until you talk to your cardiologist.    If you are on Metformin (Glucophage), do not restart it until you have blood tests (within 2 to 3 days after discharge).  After you have your blood drawn, you may restart the Metformin.   Take your medications, including blood thinners, unless your provider tells you not to.     If you take Coumadin (Warfarin), have your INR checked by your provider in  3-5 days. Call your clinic to schedule this.  If you have stopped any medicines, check with your provider about when to restart them.    Follow Up Appointments:    Follow up with [facility] Nurse Practitioner at [facility] of patient preference in 7-10 days.    Call the clinic if:    You have increased pain or a large or growing hard lump around the site.  The site is red, swollen, hot or tender.  Blood or fluid is draining from the site.  You have chills or a fever greater than 101 F (38 C).  Your leg feels numb, cool or changes color.  You have hives, a rash or unusual itching.  New pain in the back or belly that you cannot control with Tylenol.  Any questions or concerns.          [facility]:    [phone] [facility] (7 days a week)

## 2024-04-16 NOTE — PROGRESS NOTES
Care Suites Discharge Nursing Note    Patient Information  Name: Robson Gutierrez  Age: 58 year old    Discharge Education:  Discharge instructions reviewed: Yes with Shanice RESTREPO  Additional education/resources provided: No  Patient/patient representative verbalizes understanding: Yes  Patient discharging on new medications: No  Medication education completed: N/A    Discharge Plans:   Discharge location: home  Discharge ride contacted: Yes  Approximate discharge time: 1340    Discharge Criteria:  Discharge criteria met and vital signs stable: Yes    Patient Belongs:  Patient belongings returned to patient: Yes    Lala Alfaro RN

## 2024-04-17 ENCOUNTER — TELEPHONE (OUTPATIENT)
Dept: CARDIOLOGY | Facility: CLINIC | Age: 59
End: 2024-04-17
Payer: COMMERCIAL

## 2024-04-17 LAB
ATRIAL RATE - MUSE: 69 BPM
DIASTOLIC BLOOD PRESSURE - MUSE: NORMAL MMHG
INTERPRETATION ECG - MUSE: NORMAL
P AXIS - MUSE: 49 DEGREES
PR INTERVAL - MUSE: 164 MS
QRS DURATION - MUSE: 136 MS
QT - MUSE: 434 MS
QTC - MUSE: 465 MS
R AXIS - MUSE: 18 DEGREES
SYSTOLIC BLOOD PRESSURE - MUSE: NORMAL MMHG
T AXIS - MUSE: -2 DEGREES
VENTRICULAR RATE- MUSE: 69 BPM

## 2024-04-17 NOTE — TELEPHONE ENCOUNTER
Patient was admitted to [facility] on 4/16/24 with severely dilated proximal ascending aorta. Sent for coronary angiography prior to surgical repair.    4/16/24: Coronary angiogram via RFA showed normal coronary arteries.    No medication changes made.    Pt to follow up with CV surgery as instructed PTA. Pt is scheduled for AAA repair on 5/13/24.    Writer attempted to call pt for a cardiology post discharge phone call, but no answer. VM left to call back with any non emergent cardiac related questions. Reminder left that RFA access site should be healing without signs of infection, swelling or bleeding. Reminder left of procedure as scheduled above. Dr. Torres's and Dr. Arzate's Team RN phone numbers provided for any further questions. ENDER Eli RN.

## 2024-04-26 ENCOUNTER — TELEPHONE (OUTPATIENT)
Dept: OTHER | Facility: CLINIC | Age: 59
End: 2024-04-26
Payer: COMMERCIAL

## 2024-04-26 RX ORDER — CEFAZOLIN SODIUM 2 G/100ML
2 INJECTION, SOLUTION INTRAVENOUS
Status: CANCELLED | OUTPATIENT
Start: 2024-04-26

## 2024-04-26 RX ORDER — CEFAZOLIN SODIUM 2 G/100ML
2 INJECTION, SOLUTION INTRAVENOUS SEE ADMIN INSTRUCTIONS
Status: CANCELLED | OUTPATIENT
Start: 2024-04-26

## 2024-04-26 NOTE — TELEPHONE ENCOUNTER
Message left for patient regarding new surgery date. Pre op instruction letter updated and sent via my chart. Informed pt he will need a new pre op history and physical due to it being out past 30 days. Requested review and call with any questions.

## 2024-05-15 NOTE — TELEPHONE ENCOUNTER
Due to a change in the providers schedule, pts 5/20 surgery needs to be r/s. Lm asking pt to call back to r/s. New surgery date 5/21. Will try again to confirm change

## 2024-05-22 DIAGNOSIS — I71.21 ASCENDING AORTIC ANEURYSM (H): ICD-10-CM

## 2024-05-22 RX ORDER — ATENOLOL 50 MG/1
50 TABLET ORAL DAILY
Qty: 90 TABLET | Refills: 2 | Status: ON HOLD | OUTPATIENT
Start: 2024-05-22 | End: 2024-06-03

## 2024-05-29 NOTE — PROGRESS NOTES
PTA medications updated by Medication Scribe prior to surgery via phone call with patient (last doses completed by Nurse)     Medication history sources: Patient, Surescripts, and H&P  In the past week, patient estimated taking medication this percent of the time: Greater than 90%      Significant changes made to the medication list:  None      Additional medication history information:   Patient was advised to bring: Cosopt eyes drops, Latanoprost eye drops    Medication reconciliation completed by provider prior to medication history? No    Time spent in this activity: 25 minutes    The information provided in this note is only as accurate as the sources available at the time of update(s)      Prior to Admission medications    Medication Sig Last Dose Taking? Auth Provider Long Term End Date   aspirin 81 MG EC tablet Take 324 mg by mouth once  at am Yes Reported, Patient     atenolol (TENORMIN) 50 MG tablet Take 1 tablet (50 mg) by mouth daily  at am Yes Renetta Jewell PA-C Yes    chlorthalidone (HYGROTON) 25 MG tablet Take 1 tablet by mouth daily  at am Yes Reported, Patient Yes 5/29/24   dorzolamide-timolol (COSOPT) 22.3-6.8 MG/ML ophthalmic solution Place 1 drop into both eyes 2 times daily  at am Yes Reported, Patient     latanoprost (XALATAN) 0.005 % ophthalmic solution Place 1 drop into both eyes 2 times daily  at pm Yes Reported, Patient Yes        Medication history completed by: Bay Gracia

## 2024-05-30 ENCOUNTER — ANESTHESIA EVENT (OUTPATIENT)
Dept: SURGERY | Facility: CLINIC | Age: 59
DRG: 220 | End: 2024-05-30
Payer: COMMERCIAL

## 2024-05-30 ASSESSMENT — LIFESTYLE VARIABLES: TOBACCO_USE: 0

## 2024-05-30 NOTE — ANESTHESIA PREPROCEDURE EVALUATION
Anesthesia Pre-Procedure Evaluation    Patient: Robson Gutierrez   MRN: [id number] : 1965        Procedure : Procedure(s):  REPAIR, ANEURYSM, AORTA, ASCENDING          Past Medical History:   Diagnosis Date    Aortic aneurysm (H24)     Ascending aortic aneurysm (H24) 2018    4.7 cm on echo    Bicuspid aortic valve     Congenital insufficiency of aortic valve     Glaucoma     HTN (hypertension)     Pectus excavatum       Past Surgical History:   Procedure Laterality Date    CV CORONARY ANGIOGRAM N/A 2024    Procedure: Coronary Angiogram;  Surgeon: Zach Suárez MD;  Location:  [facility]    CV LEFT HEART CATH N/A 2024    Procedure: Left Heart Catheterization;  Surgeon: Zach Suárez MD;  Location:  [facility]      No Known Allergies   Social History     Tobacco Use    Smoking status: Never    Smokeless tobacco: Never   Substance Use Topics    Alcohol use: Yes     Comment: occasional      Wt Readings from Last 1 Encounters:   24 78 kg (172 lb)        Anesthesia Evaluation            ROS/MED HX  ENT/Pulmonary:    (-) tobacco use and sleep apnea   Neurologic:       Cardiovascular: Comment: Bicuspid AV, Ascending Aortic Aneurysm    (+)  hypertension- -   -  - -                                 Previous cardiac testing   Echo: Date: 23 Results:  Interpretation Summary     Known bicuspid aortic valve with complete right-left raphe.  Trace aortic valve regurgitation, no significant stenosis.  Mean systolic gradient 8 mmHg.  Aortic root measures 3.8 cm.  Ascending aorta aneurysm of 4.8-4.9 cm.  Left ventricular systolic function is normal.  The visual ejection fraction is 60-65%.  Left ventricular diastolic function is normal.  No regional wall motion abnormalities.     Compared to the prior study dated 2021, there have been no changes.  ______________________________________________________________________________  Left Ventricle  The left  ventricle is normal in size. There is normal left ventricular wall  thickness. Left ventricular systolic function is normal. The visual ejection  fraction is 60-65%. Left ventricular diastolic function is normal. No regional  wall motion abnormalities noted.     Right Ventricle  The right ventricle is normal in size and function.     Atria  Normal left atrial size. Right atrial size is normal. There is no color  Doppler evidence of an atrial shunt.     Mitral Valve  The mitral valve leaflets appear normal. There is no evidence of stenosis,  fluttering, or prolapse. There is trace mitral regurgitation. There is no  mitral valve stenosis.     Tricuspid Valve  Normal tricuspid valve. There is trace tricuspid regurgitation.     Aortic Valve  Bicuspid aortic valve with a complete LCC/RCC raphe. There is trace aortic  regurgitation. No hemodynamically significant valvular aortic stenosis. The  mean AoV pressure gradient is 8.0 mmHg.     Pulmonic Valve  Normal pulmonic valve. There is trace pulmonic valvular regurgitation. Normal  pulmonic valve velocity.     Vessels  The aortic root is normal size. The ascending aorta is Moderately dilated.  4.8-4.9 cm. The inferior vena cava was normal in size with preserved  respiratory variability.     Pericardium  There is no pericardial effusion.     Rhythm  Sinus rhythm was noted.    Stress Test:  Date: Results:    ECG Reviewed:  Date: 4/16/24 Results:  SR, RBBB  Cath:  Date: 4/16/24 Results:  Coronary Findings    Diagnostic  Dominance: Co-dominant  Left Main  The vessel was visualized by selective angiography and is moderate in size. There was 0% vessel disease.    Left Anterior Descending  The vessel was visualized by selective angiography and is moderate in size. There was 0% vessel disease.    Left Circumflex  The vessel was visualized by selective angiography and is moderate in size. There was 0% vessel disease.    Right Coronary Artery  The vessel was visualized by selective  "angiography and is moderate in size. There was 0% vessel disease.        METS/Exercise Tolerance:     Hematologic:       Musculoskeletal: Comment: Pectus Excavatum      GI/Hepatic:    (-) GERD   Renal/Genitourinary:       Endo:       Psychiatric/Substance Use:       Infectious Disease:       Malignancy:       Other:            Physical Exam    Airway        Mallampati: II   TM distance: > 3 FB   Neck ROM: full   Mouth opening: > 3 cm    Respiratory Devices and Support         Dental       (+) Multiple crowns, permanant bridges      Cardiovascular   cardiovascular exam normal          Pulmonary   pulmonary exam normal                OUTSIDE LABS:  CBC:   Lab Results   Component Value Date    WBC 11.4 (H) 04/16/2024    WBC 8.0 05/03/2019    HGB 14.9 04/16/2024    HGB 14.2 05/03/2019    HCT 43.4 04/16/2024    HCT 45.1 05/03/2019     04/16/2024     05/03/2019     BMP:   Lab Results   Component Value Date     04/16/2024     05/24/2019    POTASSIUM 3.9 04/16/2024    POTASSIUM 4.4 05/24/2019    CHLORIDE 100 04/16/2024    CHLORIDE 103 01/07/2022    CO2 26 04/16/2024    CO2 28 05/24/2019    BUN 17.4 04/16/2024    BUN 20 05/24/2019    CR 0.91 04/16/2024    CR 0.88 05/24/2019     (H) 04/16/2024    GLC 91 05/24/2019     COAGS:   Lab Results   Component Value Date    INR 1.07 04/16/2024     POC: No results found for: \"BGM\", \"HCG\", \"HCGS\"  HEPATIC:   Lab Results   Component Value Date    ALBUMIN 4.2 04/16/2024    PROTTOTAL 7.9 04/16/2024    ALT 15 04/16/2024    AST 20 04/16/2024    ALKPHOS 106 04/16/2024    BILITOTAL 0.8 04/16/2024     OTHER:   Lab Results   Component Value Date    A1C 5.5 04/16/2024    HALEY 9.6 04/16/2024       Anesthesia Plan    ASA Status:  3    NPO Status:  NPO Appropriate    Anesthesia Type: General.     - Airway: ETT   Induction: Intravenous, Propofol.   Maintenance: Inhalation.   Techniques and Equipment:     - Lines/Monitors: Arterial Line, Central Line, CVP, MAYO            " MAYO Absolute Contra-indication: NONE            MAYO Relative Contra-indication: NONE     - Blood: Blood in Room, PRBC     - Drips/Meds: Dexmed. infusion     Consents    Anesthesia Plan(s) and associated risks, benefits, and realistic alternatives discussed. Questions answered and patient/representative(s) expressed understanding.     - Discussed:     - Discussed with:  Patient, Spouse            Postoperative Care    Pain management: IV analgesics, Oral pain medications.   PONV prophylaxis: Ondansetron (or other 5HT-3)     Comments:    Other Comments: Induction with Lidocaine, Versed, Fentanyl, Propofol and Shun  Precedex gtt after induction  50mL bag of NS and micro-dripper for Protamine admin  Propofol gtt for transport           Nahun Pendleton MD    I have reviewed the pertinent notes and labs in the chart from the past 30 days and (re)examined the patient.  Any updates or changes from those notes are reflected in this note.

## 2024-05-31 ENCOUNTER — ANESTHESIA (OUTPATIENT)
Dept: SURGERY | Facility: CLINIC | Age: 59
DRG: 220 | End: 2024-05-31
Payer: COMMERCIAL

## 2024-05-31 ENCOUNTER — APPOINTMENT (OUTPATIENT)
Dept: GENERAL RADIOLOGY | Facility: CLINIC | Age: 59
DRG: 220 | End: 2024-05-31
Attending: PHYSICIAN ASSISTANT
Payer: COMMERCIAL

## 2024-05-31 ENCOUNTER — HOSPITAL ENCOUNTER (INPATIENT)
Facility: CLINIC | Age: 59
LOS: 3 days | Discharge: HOME | DRG: 220 | End: 2024-06-03
Attending: SURGERY | Admitting: SURGERY
Payer: COMMERCIAL

## 2024-05-31 DIAGNOSIS — Z98.890 STATUS POST ASCENDING AORTIC ANEURYSM REPAIR: Primary | ICD-10-CM

## 2024-05-31 DIAGNOSIS — Z86.79 STATUS POST ASCENDING AORTIC ANEURYSM REPAIR: Primary | ICD-10-CM

## 2024-05-31 PROBLEM — Z95.828 S/P ASCENDING AORTIC REPLACEMENT: Status: ACTIVE | Noted: 2024-05-31

## 2024-05-31 LAB
ABO/RH(D): NORMAL
ALBUMIN SERPL BCG-MCNC: 3.2 G/DL (ref 3.5–5.2)
ALBUMIN SERPL BCG-MCNC: 3.8 G/DL (ref 3.5–5.2)
ALLEN'S TEST: ABNORMAL
ALLEN'S TEST: ABNORMAL
ALP SERPL-CCNC: 52 U/L (ref 40–150)
ALP SERPL-CCNC: 93 U/L (ref 40–150)
ALT SERPL W P-5'-P-CCNC: 11 U/L (ref 0–70)
ALT SERPL W P-5'-P-CCNC: 16 U/L (ref 0–70)
ANION GAP SERPL CALCULATED.3IONS-SCNC: 10 MMOL/L (ref 7–15)
ANION GAP SERPL CALCULATED.3IONS-SCNC: 7 MMOL/L (ref 7–15)
ANION GAP SERPL CALCULATED.3IONS-SCNC: 8 MMOL/L (ref 7–15)
ANTIBODY SCREEN: NEGATIVE
APTT PPP: 73 SECONDS (ref 22–38)
APTT PPP: 77 SECONDS (ref 22–38)
AST SERPL W P-5'-P-CCNC: 19 U/L (ref 0–45)
AST SERPL W P-5'-P-CCNC: 28 U/L (ref 0–45)
BASE EXCESS BLDA CALC-SCNC: -0.8 MMOL/L (ref -3–3)
BASE EXCESS BLDA CALC-SCNC: -1.6 MMOL/L (ref -3–3)
BASE EXCESS BLDA CALC-SCNC: 0.7 MMOL/L (ref -3–3)
BASE EXCESS BLDA CALC-SCNC: 1.7 MMOL/L (ref -3–3)
BASE EXCESS BLDA CALC-SCNC: 1.7 MMOL/L (ref -3–3)
BASE EXCESS BLDA CALC-SCNC: 2.2 MMOL/L (ref -3–3)
BASE EXCESS BLDA CALC-SCNC: 2.9 MMOL/L (ref -3–3)
BASE EXCESS BLDV CALC-SCNC: 3.3 MMOL/L (ref -3–3)
BILIRUB SERPL-MCNC: 0.4 MG/DL
BILIRUB SERPL-MCNC: 0.9 MG/DL
BLD PROD TYP BPU: NORMAL
BLD PROD TYP BPU: NORMAL
BLOOD COMPONENT TYPE: NORMAL
BLOOD COMPONENT TYPE: NORMAL
BUN SERPL-MCNC: 13.6 MG/DL (ref 8–23)
BUN SERPL-MCNC: 14.5 MG/DL (ref 8–23)
BUN SERPL-MCNC: 18.1 MG/DL (ref 8–23)
CA-I BLD-MCNC: 3.8 MG/DL (ref 4.4–5.2)
CA-I BLD-MCNC: 3.9 MG/DL (ref 4.4–5.2)
CA-I BLD-MCNC: 4.3 MG/DL (ref 4.4–5.2)
CA-I BLD-MCNC: 4.5 MG/DL (ref 4.4–5.2)
CA-I BLD-MCNC: 4.6 MG/DL (ref 4.4–5.2)
CA-I BLD-MCNC: 4.7 MG/DL (ref 4.4–5.2)
CA-I BLD-MCNC: 5.3 MG/DL (ref 4.4–5.2)
CALCIUM SERPL-MCNC: 8.1 MG/DL (ref 8.8–10.2)
CALCIUM SERPL-MCNC: 9.1 MG/DL (ref 8.8–10.2)
CALCIUM SERPL-MCNC: 9.2 MG/DL (ref 8.8–10.2)
CHLORIDE SERPL-SCNC: 103 MMOL/L (ref 98–107)
CHLORIDE SERPL-SCNC: 108 MMOL/L (ref 98–107)
CHLORIDE SERPL-SCNC: 111 MMOL/L (ref 98–107)
CODING SYSTEM: NORMAL
CODING SYSTEM: NORMAL
COHGB MFR BLD: 100 % (ref 96–97)
COHGB MFR BLD: 99.8 % (ref 96–97)
CREAT SERPL-MCNC: 0.75 MG/DL (ref 0.67–1.17)
CREAT SERPL-MCNC: 0.79 MG/DL (ref 0.67–1.17)
CREAT SERPL-MCNC: 0.98 MG/DL (ref 0.67–1.17)
CROSSMATCH: NORMAL
CROSSMATCH: NORMAL
DEPRECATED HCO3 PLAS-SCNC: 23 MMOL/L (ref 22–29)
DEPRECATED HCO3 PLAS-SCNC: 24 MMOL/L (ref 22–29)
DEPRECATED HCO3 PLAS-SCNC: 27 MMOL/L (ref 22–29)
EGFRCR SERPLBLD CKD-EPI 2021: 88 ML/MIN/1.73M2
EGFRCR SERPLBLD CKD-EPI 2021: >90 ML/MIN/1.73M2
EGFRCR SERPLBLD CKD-EPI 2021: >90 ML/MIN/1.73M2
ERYTHROCYTE [DISTWIDTH] IN BLOOD BY AUTOMATED COUNT: 12.3 % (ref 10–15)
ERYTHROCYTE [DISTWIDTH] IN BLOOD BY AUTOMATED COUNT: 12.4 % (ref 10–15)
ERYTHROCYTE [DISTWIDTH] IN BLOOD BY AUTOMATED COUNT: 12.5 % (ref 10–15)
FIBRINOGEN PPP-MCNC: 201 MG/DL (ref 170–490)
GLUCOSE BLD-MCNC: 101 MG/DL (ref 70–99)
GLUCOSE BLD-MCNC: 102 MG/DL (ref 70–99)
GLUCOSE BLD-MCNC: 106 MG/DL (ref 70–99)
GLUCOSE BLD-MCNC: 106 MG/DL (ref 70–99)
GLUCOSE BLD-MCNC: 111 MG/DL (ref 70–99)
GLUCOSE BLD-MCNC: 117 MG/DL (ref 70–99)
GLUCOSE BLDC GLUCOMTR-MCNC: 125 MG/DL (ref 70–99)
GLUCOSE BLDC GLUCOMTR-MCNC: 137 MG/DL (ref 70–99)
GLUCOSE BLDC GLUCOMTR-MCNC: 145 MG/DL (ref 70–99)
GLUCOSE BLDC GLUCOMTR-MCNC: 147 MG/DL (ref 70–99)
GLUCOSE BLDC GLUCOMTR-MCNC: 154 MG/DL (ref 70–99)
GLUCOSE BLDC GLUCOMTR-MCNC: 167 MG/DL (ref 70–99)
GLUCOSE BLDC GLUCOMTR-MCNC: 174 MG/DL (ref 70–99)
GLUCOSE BLDC GLUCOMTR-MCNC: 183 MG/DL (ref 70–99)
GLUCOSE SERPL-MCNC: 103 MG/DL (ref 70–99)
GLUCOSE SERPL-MCNC: 120 MG/DL (ref 70–99)
GLUCOSE SERPL-MCNC: 163 MG/DL (ref 70–99)
HCO3 BLD-SCNC: 24 MMOL/L (ref 21–28)
HCO3 BLD-SCNC: 24 MMOL/L (ref 21–28)
HCO3 BLDA-SCNC: 25 MMOL/L (ref 21–28)
HCO3 BLDA-SCNC: 25 MMOL/L (ref 21–28)
HCO3 BLDA-SCNC: 26 MMOL/L (ref 21–28)
HCO3 BLDA-SCNC: 28 MMOL/L (ref 21–28)
HCO3 BLDA-SCNC: 28 MMOL/L (ref 21–28)
HCO3 BLDV-SCNC: 29 MMOL/L (ref 21–28)
HCT VFR BLD AUTO: 30.2 % (ref 40–53)
HCT VFR BLD AUTO: 33.5 % (ref 40–53)
HCT VFR BLD AUTO: 40.4 % (ref 40–53)
HGB BLD-MCNC: 10.3 G/DL (ref 13.3–17.7)
HGB BLD-MCNC: 10.4 G/DL (ref 13.3–17.7)
HGB BLD-MCNC: 10.6 G/DL (ref 13.3–17.7)
HGB BLD-MCNC: 11.4 G/DL (ref 13.3–17.7)
HGB BLD-MCNC: 12.6 G/DL (ref 13.3–17.7)
HGB BLD-MCNC: 12.9 G/DL (ref 13.3–17.7)
HGB BLD-MCNC: 13.6 G/DL (ref 13.3–17.7)
HGB BLD-MCNC: 9.7 G/DL (ref 13.3–17.7)
HGB BLD-MCNC: 9.7 G/DL (ref 13.3–17.7)
INR PPP: 1.08 (ref 0.85–1.15)
INR PPP: 1.42 (ref 0.85–1.15)
INR PPP: 1.46 (ref 0.85–1.15)
ISSUE DATE AND TIME: NORMAL
LACTATE BLD-SCNC: 1.1 MMOL/L (ref 0.7–2)
LACTATE BLD-SCNC: 1.2 MMOL/L (ref 0.7–2)
LACTATE BLD-SCNC: 1.3 MMOL/L (ref 0.7–2)
LACTATE BLD-SCNC: 1.5 MMOL/L (ref 0.7–2)
LACTATE BLD-SCNC: 1.6 MMOL/L (ref 0.7–2)
LACTATE BLD-SCNC: 1.6 MMOL/L (ref 0.7–2)
LACTATE SERPL-SCNC: 1.4 MMOL/L (ref 0.7–2)
MAGNESIUM SERPL-MCNC: 2 MG/DL (ref 1.7–2.3)
MCH RBC QN AUTO: 29.8 PG (ref 26.5–33)
MCH RBC QN AUTO: 30.1 PG (ref 26.5–33)
MCH RBC QN AUTO: 30.1 PG (ref 26.5–33)
MCHC RBC AUTO-ENTMCNC: 33.7 G/DL (ref 31.5–36.5)
MCHC RBC AUTO-ENTMCNC: 34 G/DL (ref 31.5–36.5)
MCHC RBC AUTO-ENTMCNC: 34.4 G/DL (ref 31.5–36.5)
MCV RBC AUTO: 88 FL (ref 78–100)
O2/TOTAL GAS SETTING VFR VENT: 100 %
O2/TOTAL GAS SETTING VFR VENT: 30 %
O2/TOTAL GAS SETTING VFR VENT: 40 %
O2/TOTAL GAS SETTING VFR VENT: 65 %
O2/TOTAL GAS SETTING VFR VENT: 75 %
O2/TOTAL GAS SETTING VFR VENT: 75 %
OXYHGB MFR BLDA: 98 % (ref 92–100)
OXYHGB MFR BLDA: 99 % (ref 92–100)
OXYHGB MFR BLDV: 83 % (ref 70–75)
PCO2 BLD: 41 MM HG (ref 35–45)
PCO2 BLD: 45 MM HG (ref 35–45)
PCO2 BLDA: 36 MM HG (ref 35–45)
PCO2 BLDA: 37 MM HG (ref 35–45)
PCO2 BLDA: 38 MM HG (ref 35–45)
PCO2 BLDA: 42 MM HG (ref 35–45)
PCO2 BLDA: 45 MM HG (ref 35–45)
PCO2 BLDV: 47 MM HG (ref 40–50)
PH BLD: 7.34 [PH] (ref 7.35–7.45)
PH BLD: 7.38 [PH] (ref 7.35–7.45)
PH BLDA: 7.39 [PH] (ref 7.35–7.45)
PH BLDA: 7.43 [PH] (ref 7.35–7.45)
PH BLDA: 7.43 [PH] (ref 7.35–7.45)
PH BLDA: 7.44 [PH] (ref 7.35–7.45)
PH BLDA: 7.46 [PH] (ref 7.35–7.45)
PH BLDV: 7.4 [PH] (ref 7.32–7.43)
PHOSPHATE SERPL-MCNC: 2 MG/DL (ref 2.5–4.5)
PLATELET # BLD AUTO: 133 10E3/UL (ref 150–450)
PLATELET # BLD AUTO: 135 10E3/UL (ref 150–450)
PLATELET # BLD AUTO: 194 10E3/UL (ref 150–450)
PO2 BLD: 155 MM HG (ref 80–105)
PO2 BLD: 183 MM HG (ref 80–105)
PO2 BLDA: 259 MM HG (ref 80–105)
PO2 BLDA: 352 MM HG (ref 80–105)
PO2 BLDA: 530 MM HG (ref 80–105)
PO2 BLDA: 589 MM HG (ref 80–105)
PO2 BLDA: 592 MM HG (ref 80–105)
PO2 BLDV: 45 MM HG (ref 25–47)
POTASSIUM BLD-SCNC: 3.4 MMOL/L (ref 3.4–5.3)
POTASSIUM BLD-SCNC: 3.5 MMOL/L (ref 3.4–5.3)
POTASSIUM BLD-SCNC: 4 MMOL/L (ref 3.4–5.3)
POTASSIUM BLD-SCNC: 4.2 MMOL/L (ref 3.4–5.3)
POTASSIUM SERPL-SCNC: 3.5 MMOL/L (ref 3.4–5.3)
POTASSIUM SERPL-SCNC: 3.7 MMOL/L (ref 3.4–5.3)
POTASSIUM SERPL-SCNC: 4.2 MMOL/L (ref 3.4–5.3)
PROT SERPL-MCNC: 5 G/DL (ref 6.4–8.3)
PROT SERPL-MCNC: 7.2 G/DL (ref 6.4–8.3)
RBC # BLD AUTO: 3.45 10E6/UL (ref 4.4–5.9)
RBC # BLD AUTO: 3.79 10E6/UL (ref 4.4–5.9)
RBC # BLD AUTO: 4.57 10E6/UL (ref 4.4–5.9)
SAO2 % BLDA: 100 % (ref 96–97)
SAO2 % BLDA: 97 % (ref 92–100)
SAO2 % BLDA: 98 % (ref 92–100)
SAO2 % BLDA: >100 % (ref 96–97)
SAO2 % BLDV: 85 % (ref 70–75)
SODIUM BLD-SCNC: 140 MMOL/L (ref 135–145)
SODIUM BLD-SCNC: 140 MMOL/L (ref 135–145)
SODIUM BLD-SCNC: 141 MMOL/L (ref 135–145)
SODIUM SERPL-SCNC: 139 MMOL/L (ref 135–145)
SODIUM SERPL-SCNC: 140 MMOL/L (ref 135–145)
SODIUM SERPL-SCNC: 142 MMOL/L (ref 135–145)
SPECIMEN EXPIRATION DATE: NORMAL
UNIT ABO/RH: NORMAL
UNIT ABO/RH: NORMAL
UNIT NUMBER: NORMAL
UNIT NUMBER: NORMAL
UNIT STATUS: NORMAL
UNIT STATUS: NORMAL
UNIT TYPE ISBT: 5100
UNIT TYPE ISBT: 5100
WBC # BLD AUTO: 5.2 10E3/UL (ref 4–11)
WBC # BLD AUTO: 7.3 10E3/UL (ref 4–11)
WBC # BLD AUTO: 9.1 10E3/UL (ref 4–11)

## 2024-05-31 PROCEDURE — 250N000011 HC RX IP 250 OP 636

## 2024-05-31 PROCEDURE — 999N000259 HC STATISTIC EXTUBATION

## 2024-05-31 PROCEDURE — 258N000003 HC RX IP 258 OP 636: Performed by: SURGERY

## 2024-05-31 PROCEDURE — 85048 AUTOMATED LEUKOCYTE COUNT: CPT | Performed by: SURGERY

## 2024-05-31 PROCEDURE — 82805 BLOOD GASES W/O2 SATURATION: CPT | Performed by: SURGERY

## 2024-05-31 PROCEDURE — 36415 COLL VENOUS BLD VENIPUNCTURE: CPT | Performed by: SURGERY

## 2024-05-31 PROCEDURE — 999N000253 HC STATISTIC WEANING TRIALS

## 2024-05-31 PROCEDURE — 200N000001 HC R&B ICU

## 2024-05-31 PROCEDURE — 83735 ASSAY OF MAGNESIUM: CPT | Performed by: PHYSICIAN ASSISTANT

## 2024-05-31 PROCEDURE — 83605 ASSAY OF LACTIC ACID: CPT | Performed by: PHYSICIAN ASSISTANT

## 2024-05-31 PROCEDURE — 82805 BLOOD GASES W/O2 SATURATION: CPT | Performed by: PHYSICIAN ASSISTANT

## 2024-05-31 PROCEDURE — 02RX0JZ REPLACEMENT OF THORACIC AORTA, ASCENDING/ARCH WITH SYNTHETIC SUBSTITUTE, OPEN APPROACH: ICD-10-PCS | Performed by: SURGERY

## 2024-05-31 PROCEDURE — 250N000009 HC RX 250: Performed by: PHYSICIAN ASSISTANT

## 2024-05-31 PROCEDURE — 999N000141 HC STATISTIC PRE-PROCEDURE NURSING ASSESSMENT: Performed by: SURGERY

## 2024-05-31 PROCEDURE — 99291 CRITICAL CARE FIRST HOUR: CPT | Mod: 24 | Performed by: SURGERY

## 2024-05-31 PROCEDURE — 85610 PROTHROMBIN TIME: CPT | Performed by: PHYSICIAN ASSISTANT

## 2024-05-31 PROCEDURE — 258N000003 HC RX IP 258 OP 636

## 2024-05-31 PROCEDURE — C1768 GRAFT, VASCULAR: HCPCS | Performed by: SURGERY

## 2024-05-31 PROCEDURE — 85610 PROTHROMBIN TIME: CPT | Performed by: SURGERY

## 2024-05-31 PROCEDURE — 88304 TISSUE EXAM BY PATHOLOGIST: CPT | Mod: TC | Performed by: SURGERY

## 2024-05-31 PROCEDURE — 250N000009 HC RX 250: Performed by: SURGERY

## 2024-05-31 PROCEDURE — 250N000011 HC RX IP 250 OP 636: Performed by: ANESTHESIOLOGY

## 2024-05-31 PROCEDURE — 258N000003 HC RX IP 258 OP 636: Performed by: ANESTHESIOLOGY

## 2024-05-31 PROCEDURE — 250N000013 HC RX MED GY IP 250 OP 250 PS 637: Performed by: SURGERY

## 2024-05-31 PROCEDURE — 250N000011 HC RX IP 250 OP 636: Performed by: SURGERY

## 2024-05-31 PROCEDURE — 999N000157 HC STATISTIC RCP TIME EA 10 MIN

## 2024-05-31 PROCEDURE — 250N000009 HC RX 250

## 2024-05-31 PROCEDURE — 84100 ASSAY OF PHOSPHORUS: CPT | Performed by: PHYSICIAN ASSISTANT

## 2024-05-31 PROCEDURE — 88304 TISSUE EXAM BY PATHOLOGIST: CPT | Mod: 26 | Performed by: PATHOLOGY

## 2024-05-31 PROCEDURE — 85730 THROMBOPLASTIN TIME PARTIAL: CPT | Performed by: PHYSICIAN ASSISTANT

## 2024-05-31 PROCEDURE — 94002 VENT MGMT INPAT INIT DAY: CPT

## 2024-05-31 PROCEDURE — C1781 MESH (IMPLANTABLE): HCPCS | Performed by: SURGERY

## 2024-05-31 PROCEDURE — C1898 LEAD, PMKR, OTHER THAN TRANS: HCPCS | Performed by: SURGERY

## 2024-05-31 PROCEDURE — 258N000003 HC RX IP 258 OP 636: Performed by: INTERNAL MEDICINE

## 2024-05-31 PROCEDURE — 80053 COMPREHEN METABOLIC PANEL: CPT | Performed by: SURGERY

## 2024-05-31 PROCEDURE — 33859 AS-AORT GRF F/DS OTH/THN DSJ: CPT

## 2024-05-31 PROCEDURE — P9016 RBC LEUKOCYTES REDUCED: HCPCS | Performed by: SURGERY

## 2024-05-31 PROCEDURE — 370N000017 HC ANESTHESIA TECHNICAL FEE, PER MIN: Performed by: SURGERY

## 2024-05-31 PROCEDURE — 93005 ELECTROCARDIOGRAM TRACING: CPT

## 2024-05-31 PROCEDURE — 84295 ASSAY OF SERUM SODIUM: CPT | Performed by: PHYSICIAN ASSISTANT

## 2024-05-31 PROCEDURE — 71045 X-RAY EXAM CHEST 1 VIEW: CPT

## 2024-05-31 PROCEDURE — 85384 FIBRINOGEN ACTIVITY: CPT | Performed by: SURGERY

## 2024-05-31 PROCEDURE — 999N000009 HC STATISTIC AIRWAY CARE

## 2024-05-31 PROCEDURE — P9045 ALBUMIN (HUMAN), 5%, 250 ML: HCPCS | Mod: JZ

## 2024-05-31 PROCEDURE — 250N000013 HC RX MED GY IP 250 OP 250 PS 637: Performed by: PHYSICIAN ASSISTANT

## 2024-05-31 PROCEDURE — 33859 AS-AORT GRF F/DS OTH/THN DSJ: CPT | Performed by: ANESTHESIOLOGY

## 2024-05-31 PROCEDURE — 85730 THROMBOPLASTIN TIME PARTIAL: CPT | Performed by: SURGERY

## 2024-05-31 PROCEDURE — 84295 ASSAY OF SERUM SODIUM: CPT | Performed by: SURGERY

## 2024-05-31 PROCEDURE — 86900 BLOOD TYPING SEROLOGIC ABO: CPT | Performed by: SURGERY

## 2024-05-31 PROCEDURE — 33859 AS-AORT GRF F/DS OTH/THN DSJ: CPT | Mod: GC | Performed by: SURGERY

## 2024-05-31 PROCEDURE — 250N000024 HC ISOFLURANE, PER MIN: Performed by: SURGERY

## 2024-05-31 PROCEDURE — 360N000079 HC SURGERY LEVEL 6, PER MIN: Performed by: SURGERY

## 2024-05-31 PROCEDURE — 86923 COMPATIBILITY TEST ELECTRIC: CPT | Performed by: SURGERY

## 2024-05-31 PROCEDURE — 258N000003 HC RX IP 258 OP 636: Performed by: PHYSICIAN ASSISTANT

## 2024-05-31 PROCEDURE — 272N000001 HC OR GENERAL SUPPLY STERILE: Performed by: SURGERY

## 2024-05-31 PROCEDURE — 82330 ASSAY OF CALCIUM: CPT | Performed by: PHYSICIAN ASSISTANT

## 2024-05-31 PROCEDURE — 250N000009 HC RX 250: Performed by: ANESTHESIOLOGY

## 2024-05-31 PROCEDURE — 85014 HEMATOCRIT: CPT | Performed by: PHYSICIAN ASSISTANT

## 2024-05-31 PROCEDURE — 5A1221Z PERFORMANCE OF CARDIAC OUTPUT, CONTINUOUS: ICD-10-PCS | Performed by: SURGERY

## 2024-05-31 PROCEDURE — 250N000011 HC RX IP 250 OP 636: Performed by: PHYSICIAN ASSISTANT

## 2024-05-31 DEVICE — BARD® PTFE FELT, 15.2 CM X 15.2 CM
Type: IMPLANTABLE DEVICE | Site: HEART | Status: FUNCTIONAL
Brand: BARD® PTFE FELT

## 2024-05-31 DEVICE — HEMASHIELD PLATINUM WOVEN STRAIGHT DOUBLE VELOUR VASCULAR GRAFT WITH GRAFT SIZER ACCESSORY
Type: IMPLANTABLE DEVICE | Site: HEART | Status: FUNCTIONAL
Brand: HEMASHIELD

## 2024-05-31 RX ORDER — SODIUM CHLORIDE, SODIUM LACTATE, POTASSIUM CHLORIDE, CALCIUM CHLORIDE 600; 310; 30; 20 MG/100ML; MG/100ML; MG/100ML; MG/100ML
INJECTION, SOLUTION INTRAVENOUS CONTINUOUS
Status: DISCONTINUED | OUTPATIENT
Start: 2024-05-31 | End: 2024-05-31 | Stop reason: HOSPADM

## 2024-05-31 RX ORDER — AMOXICILLIN 250 MG
1 CAPSULE ORAL 2 TIMES DAILY
Status: DISCONTINUED | OUTPATIENT
Start: 2024-05-31 | End: 2024-06-03 | Stop reason: HOSPADM

## 2024-05-31 RX ORDER — ONDANSETRON 4 MG/1
4 TABLET, ORALLY DISINTEGRATING ORAL EVERY 6 HOURS PRN
Status: DISCONTINUED | OUTPATIENT
Start: 2024-05-31 | End: 2024-06-03 | Stop reason: HOSPADM

## 2024-05-31 RX ORDER — HYDROMORPHONE HCL IN WATER/PF 6 MG/30 ML
0.2 PATIENT CONTROLLED ANALGESIA SYRINGE INTRAVENOUS
Status: DISCONTINUED | OUTPATIENT
Start: 2024-05-31 | End: 2024-06-03 | Stop reason: HOSPADM

## 2024-05-31 RX ORDER — PROPOFOL 10 MG/ML
INJECTION, EMULSION INTRAVENOUS PRN
Status: DISCONTINUED | OUTPATIENT
Start: 2024-05-31 | End: 2024-05-31

## 2024-05-31 RX ORDER — FAMOTIDINE 20 MG/1
20 TABLET, FILM COATED ORAL
Status: COMPLETED | OUTPATIENT
Start: 2024-05-31 | End: 2024-05-31

## 2024-05-31 RX ORDER — EPINEPHRINE IN 0.9 % SOD CHLOR 5 MG/250ML
.01-.1 PLASTIC BAG, INJECTION (ML) INTRAVENOUS CONTINUOUS PRN
Status: DISCONTINUED | OUTPATIENT
Start: 2024-05-31 | End: 2024-06-02

## 2024-05-31 RX ORDER — GABAPENTIN 100 MG/1
100 CAPSULE ORAL 3 TIMES DAILY
Status: DISCONTINUED | OUTPATIENT
Start: 2024-05-31 | End: 2024-06-03

## 2024-05-31 RX ORDER — PROCHLORPERAZINE MALEATE 10 MG
10 TABLET ORAL EVERY 6 HOURS PRN
Status: DISCONTINUED | OUTPATIENT
Start: 2024-05-31 | End: 2024-06-03 | Stop reason: HOSPADM

## 2024-05-31 RX ORDER — METHOCARBAMOL 750 MG/1
750 TABLET, FILM COATED ORAL EVERY 6 HOURS PRN
Status: DISCONTINUED | OUTPATIENT
Start: 2024-05-31 | End: 2024-06-03 | Stop reason: HOSPADM

## 2024-05-31 RX ORDER — ONDANSETRON 2 MG/ML
INJECTION INTRAMUSCULAR; INTRAVENOUS PRN
Status: DISCONTINUED | OUTPATIENT
Start: 2024-05-31 | End: 2024-05-31

## 2024-05-31 RX ORDER — LIDOCAINE 40 MG/G
CREAM TOPICAL
Status: DISCONTINUED | OUTPATIENT
Start: 2024-05-31 | End: 2024-05-31 | Stop reason: HOSPADM

## 2024-05-31 RX ORDER — DORZOLAMIDE HYDROCHLORIDE AND TIMOLOL MALEATE 20; 5 MG/ML; MG/ML
1 SOLUTION/ DROPS OPHTHALMIC 2 TIMES DAILY
Status: DISCONTINUED | OUTPATIENT
Start: 2024-05-31 | End: 2024-06-03 | Stop reason: HOSPADM

## 2024-05-31 RX ORDER — MAGNESIUM SULFATE HEPTAHYDRATE 40 MG/ML
2 INJECTION, SOLUTION INTRAVENOUS ONCE
Status: COMPLETED | OUTPATIENT
Start: 2024-05-31 | End: 2024-05-31

## 2024-05-31 RX ORDER — FENTANYL CITRATE 0.05 MG/ML
50 INJECTION, SOLUTION INTRAMUSCULAR; INTRAVENOUS
Status: COMPLETED | OUTPATIENT
Start: 2024-05-31 | End: 2024-05-31

## 2024-05-31 RX ORDER — LATANOPROST 50 UG/ML
1 SOLUTION/ DROPS OPHTHALMIC 2 TIMES DAILY
Status: DISCONTINUED | OUTPATIENT
Start: 2024-05-31 | End: 2024-05-31

## 2024-05-31 RX ORDER — OXYCODONE HYDROCHLORIDE 5 MG/1
5 TABLET ORAL EVERY 4 HOURS PRN
Status: DISCONTINUED | OUTPATIENT
Start: 2024-05-31 | End: 2024-06-03 | Stop reason: HOSPADM

## 2024-05-31 RX ORDER — ACETAMINOPHEN 325 MG/1
975 TABLET ORAL EVERY 8 HOURS
Qty: 27 TABLET | Refills: 0 | Status: DISCONTINUED | OUTPATIENT
Start: 2024-05-31 | End: 2024-06-03 | Stop reason: HOSPADM

## 2024-05-31 RX ORDER — MAGNESIUM HYDROXIDE 1200 MG/15ML
LIQUID ORAL PRN
Status: DISCONTINUED | OUTPATIENT
Start: 2024-05-31 | End: 2024-05-31 | Stop reason: HOSPADM

## 2024-05-31 RX ORDER — LATANOPROST 50 UG/ML
1 SOLUTION/ DROPS OPHTHALMIC AT BEDTIME
Status: DISCONTINUED | OUTPATIENT
Start: 2024-05-31 | End: 2024-06-03 | Stop reason: HOSPADM

## 2024-05-31 RX ORDER — CEFAZOLIN SODIUM/WATER 2 G/20 ML
2 SYRINGE (ML) INTRAVENOUS
Status: COMPLETED | OUTPATIENT
Start: 2024-05-31 | End: 2024-05-31

## 2024-05-31 RX ORDER — CEFAZOLIN SODIUM 1 G/3ML
1 INJECTION, POWDER, FOR SOLUTION INTRAMUSCULAR; INTRAVENOUS EVERY 8 HOURS
Qty: 15 ML | Refills: 0 | Status: COMPLETED | OUTPATIENT
Start: 2024-05-31 | End: 2024-06-01

## 2024-05-31 RX ORDER — PHENYLEPHRINE HCL IN 0.9% NACL 50MG/250ML
PLASTIC BAG, INJECTION (ML) INTRAVENOUS CONTINUOUS PRN
Status: DISCONTINUED | OUTPATIENT
Start: 2024-05-31 | End: 2024-05-31

## 2024-05-31 RX ORDER — LIDOCAINE 40 MG/G
CREAM TOPICAL
Status: DISCONTINUED | OUTPATIENT
Start: 2024-05-31 | End: 2024-06-03 | Stop reason: HOSPADM

## 2024-05-31 RX ORDER — POLYETHYLENE GLYCOL 3350 17 G/17G
17 POWDER, FOR SOLUTION ORAL DAILY
Status: DISCONTINUED | OUTPATIENT
Start: 2024-06-01 | End: 2024-06-03

## 2024-05-31 RX ORDER — PHENYLEPHRINE HCL IN 0.9% NACL 50MG/250ML
.1-6 PLASTIC BAG, INJECTION (ML) INTRAVENOUS CONTINUOUS
Status: DISCONTINUED | OUTPATIENT
Start: 2024-05-31 | End: 2024-06-02

## 2024-05-31 RX ORDER — NOREPINEPHRINE BITARTRATE 0.06 MG/ML
.01-.4 INJECTION, SOLUTION INTRAVENOUS CONTINUOUS PRN
Status: DISCONTINUED | OUTPATIENT
Start: 2024-05-31 | End: 2024-06-02

## 2024-05-31 RX ORDER — HEPARIN SODIUM 5000 [USP'U]/.5ML
5000 INJECTION, SOLUTION INTRAVENOUS; SUBCUTANEOUS EVERY 8 HOURS
Status: DISCONTINUED | OUTPATIENT
Start: 2024-06-01 | End: 2024-06-03 | Stop reason: HOSPADM

## 2024-05-31 RX ORDER — PROPOFOL 10 MG/ML
5-75 INJECTION, EMULSION INTRAVENOUS CONTINUOUS
Status: DISCONTINUED | OUTPATIENT
Start: 2024-05-31 | End: 2024-06-01

## 2024-05-31 RX ORDER — POTASSIUM CHLORIDE 29.8 MG/ML
20 INJECTION INTRAVENOUS ONCE
Status: COMPLETED | OUTPATIENT
Start: 2024-05-31 | End: 2024-05-31

## 2024-05-31 RX ORDER — ACETAMINOPHEN 325 MG/1
650 TABLET ORAL EVERY 4 HOURS PRN
Status: DISCONTINUED | OUTPATIENT
Start: 2024-06-03 | End: 2024-06-03 | Stop reason: HOSPADM

## 2024-05-31 RX ORDER — NALOXONE HYDROCHLORIDE 0.4 MG/ML
0.2 INJECTION, SOLUTION INTRAMUSCULAR; INTRAVENOUS; SUBCUTANEOUS
Status: DISCONTINUED | OUTPATIENT
Start: 2024-05-31 | End: 2024-06-03 | Stop reason: HOSPADM

## 2024-05-31 RX ORDER — PANTOPRAZOLE SODIUM 40 MG/1
40 TABLET, DELAYED RELEASE ORAL DAILY
Status: DISCONTINUED | OUTPATIENT
Start: 2024-05-31 | End: 2024-06-03 | Stop reason: HOSPADM

## 2024-05-31 RX ORDER — CALCIUM GLUCONATE 20 MG/ML
2 INJECTION, SOLUTION INTRAVENOUS EVERY 6 HOURS PRN
Status: DISCONTINUED | OUTPATIENT
Start: 2024-05-31 | End: 2024-06-02

## 2024-05-31 RX ORDER — FUROSEMIDE 10 MG/ML
20 INJECTION INTRAMUSCULAR; INTRAVENOUS
Status: DISCONTINUED | OUTPATIENT
Start: 2024-05-31 | End: 2024-06-02

## 2024-05-31 RX ORDER — PROTAMINE SULFATE 10 MG/ML
INJECTION, SOLUTION INTRAVENOUS PRN
Status: DISCONTINUED | OUTPATIENT
Start: 2024-05-31 | End: 2024-05-31

## 2024-05-31 RX ORDER — CALCIUM CARBONATE 500 MG/1
500 TABLET, CHEWABLE ORAL 4 TIMES DAILY PRN
Status: DISCONTINUED | OUTPATIENT
Start: 2024-05-31 | End: 2024-06-03 | Stop reason: HOSPADM

## 2024-05-31 RX ORDER — CEFAZOLIN SODIUM/WATER 2 G/20 ML
2 SYRINGE (ML) INTRAVENOUS SEE ADMIN INSTRUCTIONS
Status: DISCONTINUED | OUTPATIENT
Start: 2024-05-31 | End: 2024-05-31 | Stop reason: HOSPADM

## 2024-05-31 RX ORDER — CALCIUM GLUCONATE 20 MG/ML
1 INJECTION, SOLUTION INTRAVENOUS EVERY 6 HOURS PRN
Status: DISCONTINUED | OUTPATIENT
Start: 2024-05-31 | End: 2024-06-02

## 2024-05-31 RX ORDER — DEXMEDETOMIDINE HYDROCHLORIDE 4 UG/ML
.2-.7 INJECTION, SOLUTION INTRAVENOUS CONTINUOUS
Status: DISCONTINUED | OUTPATIENT
Start: 2024-05-31 | End: 2024-06-02

## 2024-05-31 RX ORDER — VECURONIUM BROMIDE 1 MG/ML
INJECTION, POWDER, LYOPHILIZED, FOR SOLUTION INTRAVENOUS PRN
Status: DISCONTINUED | OUTPATIENT
Start: 2024-05-31 | End: 2024-05-31

## 2024-05-31 RX ORDER — VANCOMYCIN HYDROCHLORIDE 1 G/200ML
1000 INJECTION, SOLUTION INTRAVENOUS
Status: COMPLETED | OUTPATIENT
Start: 2024-05-31 | End: 2024-05-31

## 2024-05-31 RX ORDER — LIDOCAINE HYDROCHLORIDE 20 MG/ML
INJECTION, SOLUTION INFILTRATION; PERINEURAL PRN
Status: DISCONTINUED | OUTPATIENT
Start: 2024-05-31 | End: 2024-05-31

## 2024-05-31 RX ORDER — BISACODYL 10 MG
10 SUPPOSITORY, RECTAL RECTAL DAILY PRN
Status: DISCONTINUED | OUTPATIENT
Start: 2024-06-03 | End: 2024-06-03 | Stop reason: HOSPADM

## 2024-05-31 RX ORDER — SODIUM CHLORIDE 9 MG/ML
INJECTION, SOLUTION INTRAVENOUS CONTINUOUS PRN
Status: DISCONTINUED | OUTPATIENT
Start: 2024-05-31 | End: 2024-05-31

## 2024-05-31 RX ORDER — ONDANSETRON 2 MG/ML
4 INJECTION INTRAMUSCULAR; INTRAVENOUS EVERY 6 HOURS PRN
Status: DISCONTINUED | OUTPATIENT
Start: 2024-05-31 | End: 2024-06-03 | Stop reason: HOSPADM

## 2024-05-31 RX ORDER — ASPIRIN 81 MG/1
81 TABLET, CHEWABLE ORAL DAILY
Status: DISCONTINUED | OUTPATIENT
Start: 2024-06-01 | End: 2024-06-03 | Stop reason: HOSPADM

## 2024-05-31 RX ORDER — DEXTROSE MONOHYDRATE 25 G/50ML
25-50 INJECTION, SOLUTION INTRAVENOUS
Status: DISCONTINUED | OUTPATIENT
Start: 2024-05-31 | End: 2024-06-01

## 2024-05-31 RX ORDER — HEPARIN SODIUM 1000 [USP'U]/ML
INJECTION, SOLUTION INTRAVENOUS; SUBCUTANEOUS PRN
Status: DISCONTINUED | OUTPATIENT
Start: 2024-05-31 | End: 2024-05-31

## 2024-05-31 RX ORDER — NALOXONE HYDROCHLORIDE 0.4 MG/ML
0.4 INJECTION, SOLUTION INTRAMUSCULAR; INTRAVENOUS; SUBCUTANEOUS
Status: DISCONTINUED | OUTPATIENT
Start: 2024-05-31 | End: 2024-06-03 | Stop reason: HOSPADM

## 2024-05-31 RX ORDER — OXYCODONE HYDROCHLORIDE 5 MG/1
10 TABLET ORAL EVERY 4 HOURS PRN
Status: DISCONTINUED | OUTPATIENT
Start: 2024-05-31 | End: 2024-06-03 | Stop reason: HOSPADM

## 2024-05-31 RX ORDER — ASPIRIN 81 MG/1
162 TABLET, CHEWABLE ORAL
Status: DISCONTINUED | OUTPATIENT
Start: 2024-05-31 | End: 2024-05-31 | Stop reason: HOSPADM

## 2024-05-31 RX ORDER — ASPIRIN 81 MG/1
81 TABLET, CHEWABLE ORAL
Status: DISCONTINUED | OUTPATIENT
Start: 2024-05-31 | End: 2024-05-31 | Stop reason: HOSPADM

## 2024-05-31 RX ORDER — CHLORHEXIDINE GLUCONATE ORAL RINSE 1.2 MG/ML
10 SOLUTION DENTAL ONCE
Status: COMPLETED | OUTPATIENT
Start: 2024-05-31 | End: 2024-05-31

## 2024-05-31 RX ORDER — PROPOFOL 10 MG/ML
INJECTION, EMULSION INTRAVENOUS CONTINUOUS PRN
Status: DISCONTINUED | OUTPATIENT
Start: 2024-05-31 | End: 2024-05-31

## 2024-05-31 RX ORDER — SODIUM CHLORIDE, SODIUM LACTATE, POTASSIUM CHLORIDE, CALCIUM CHLORIDE 600; 310; 30; 20 MG/100ML; MG/100ML; MG/100ML; MG/100ML
INJECTION, SOLUTION INTRAVENOUS CONTINUOUS PRN
Status: DISCONTINUED | OUTPATIENT
Start: 2024-05-31 | End: 2024-05-31

## 2024-05-31 RX ORDER — HYDRALAZINE HYDROCHLORIDE 20 MG/ML
10 INJECTION INTRAMUSCULAR; INTRAVENOUS EVERY 30 MIN PRN
Status: DISCONTINUED | OUTPATIENT
Start: 2024-05-31 | End: 2024-06-03 | Stop reason: HOSPADM

## 2024-05-31 RX ORDER — HYDROMORPHONE HCL IN WATER/PF 6 MG/30 ML
0.4 PATIENT CONTROLLED ANALGESIA SYRINGE INTRAVENOUS
Status: DISCONTINUED | OUTPATIENT
Start: 2024-05-31 | End: 2024-06-03 | Stop reason: HOSPADM

## 2024-05-31 RX ORDER — SODIUM CHLORIDE, SODIUM LACTATE, POTASSIUM CHLORIDE, CALCIUM CHLORIDE 600; 310; 30; 20 MG/100ML; MG/100ML; MG/100ML; MG/100ML
INJECTION, SOLUTION INTRAVENOUS CONTINUOUS
Status: DISCONTINUED | OUTPATIENT
Start: 2024-05-31 | End: 2024-06-02

## 2024-05-31 RX ORDER — NICOTINE POLACRILEX 4 MG
15-30 LOZENGE BUCCAL
Status: DISCONTINUED | OUTPATIENT
Start: 2024-05-31 | End: 2024-06-01

## 2024-05-31 RX ORDER — LIDOCAINE HYDROCHLORIDE 10 MG/ML
INJECTION, SOLUTION INFILTRATION; PERINEURAL
Status: COMPLETED | OUTPATIENT
Start: 2024-05-31 | End: 2024-05-31

## 2024-05-31 RX ADMIN — FENTANYL CITRATE 100 MCG: 50 INJECTION, SOLUTION INTRAMUSCULAR; INTRAVENOUS at 08:15

## 2024-05-31 RX ADMIN — PHENYLEPHRINE HYDROCHLORIDE 50 MCG: 10 INJECTION INTRAVENOUS at 10:43

## 2024-05-31 RX ADMIN — HEPARIN SODIUM 25000 UNITS: 1000 INJECTION INTRAVENOUS; SUBCUTANEOUS at 08:17

## 2024-05-31 RX ADMIN — CEFAZOLIN 1 G: 1 INJECTION, POWDER, FOR SOLUTION INTRAMUSCULAR; INTRAVENOUS at 16:45

## 2024-05-31 RX ADMIN — EPINEPHRINE 10 MCG: 1 INJECTION INTRAMUSCULAR; INTRAVENOUS; SUBCUTANEOUS at 10:13

## 2024-05-31 RX ADMIN — FENTANYL CITRATE 50 MCG: 50 INJECTION, SOLUTION INTRAMUSCULAR; INTRAVENOUS at 06:46

## 2024-05-31 RX ADMIN — NOREPINEPHRINE BITARTRATE 6.4 MCG: 1 INJECTION, SOLUTION, CONCENTRATE INTRAVENOUS at 10:01

## 2024-05-31 RX ADMIN — FENTANYL CITRATE 100 MCG: 50 INJECTION, SOLUTION INTRAMUSCULAR; INTRAVENOUS at 09:42

## 2024-05-31 RX ADMIN — VANCOMYCIN HYDROCHLORIDE 1000 MG: 1 INJECTION, SOLUTION INTRAVENOUS at 06:09

## 2024-05-31 RX ADMIN — FENTANYL CITRATE 100 MCG: 50 INJECTION, SOLUTION INTRAMUSCULAR; INTRAVENOUS at 10:35

## 2024-05-31 RX ADMIN — FENTANYL CITRATE 100 MCG: 50 INJECTION, SOLUTION INTRAMUSCULAR; INTRAVENOUS at 08:26

## 2024-05-31 RX ADMIN — ROCURONIUM BROMIDE 50 MG: 50 INJECTION, SOLUTION INTRAVENOUS at 08:00

## 2024-05-31 RX ADMIN — NOREPINEPHRINE BITARTRATE 6.4 MCG: 1 INJECTION, SOLUTION, CONCENTRATE INTRAVENOUS at 10:07

## 2024-05-31 RX ADMIN — AMINOCAPROIC ACID 5 G: 250 INJECTION, SOLUTION INTRAVENOUS at 08:30

## 2024-05-31 RX ADMIN — DORZOLAMIDE HYDROCHLORIDE AND TIMOLOL MALEATE 1 DROP: 20; 5 SOLUTION/ DROPS OPHTHALMIC at 22:00

## 2024-05-31 RX ADMIN — NOREPINEPHRINE BITARTRATE 6.4 MCG: 1 INJECTION, SOLUTION, CONCENTRATE INTRAVENOUS at 10:20

## 2024-05-31 RX ADMIN — SODIUM CHLORIDE: 9 INJECTION, SOLUTION INTRAVENOUS at 09:49

## 2024-05-31 RX ADMIN — PROTAMINE SULFATE 50 MG: 10 INJECTION, SOLUTION INTRAVENOUS at 10:15

## 2024-05-31 RX ADMIN — MIDAZOLAM 4 MG: 1 INJECTION INTRAMUSCULAR; INTRAVENOUS at 07:32

## 2024-05-31 RX ADMIN — DEXMEDETOMIDINE HYDROCHLORIDE 0.2 MCG/KG/HR: 400 INJECTION INTRAVENOUS at 13:11

## 2024-05-31 RX ADMIN — Medication 2 G: at 07:40

## 2024-05-31 RX ADMIN — LATANOPROST 1 DROP: 50 SOLUTION/ DROPS OPHTHALMIC at 22:03

## 2024-05-31 RX ADMIN — GABAPENTIN 100 MG: 100 CAPSULE ORAL at 21:55

## 2024-05-31 RX ADMIN — SODIUM CHLORIDE, POTASSIUM CHLORIDE, SODIUM LACTATE AND CALCIUM CHLORIDE: 600; 310; 30; 20 INJECTION, SOLUTION INTRAVENOUS at 07:30

## 2024-05-31 RX ADMIN — PANTOPRAZOLE SODIUM 40 MG: 40 TABLET, DELAYED RELEASE ORAL at 18:07

## 2024-05-31 RX ADMIN — PROPOFOL 20 MG: 10 INJECTION, EMULSION INTRAVENOUS at 08:28

## 2024-05-31 RX ADMIN — NOREPINEPHRINE BITARTRATE 3.2 MCG: 1 INJECTION, SOLUTION, CONCENTRATE INTRAVENOUS at 10:40

## 2024-05-31 RX ADMIN — PHENYLEPHRINE HYDROCHLORIDE 50 MCG: 10 INJECTION INTRAVENOUS at 08:36

## 2024-05-31 RX ADMIN — POTASSIUM CHLORIDE 20 MEQ: 29.8 INJECTION, SOLUTION INTRAVENOUS at 15:35

## 2024-05-31 RX ADMIN — VECURONIUM BROMIDE 4 MG: 1 INJECTION, POWDER, LYOPHILIZED, FOR SOLUTION INTRAVENOUS at 09:17

## 2024-05-31 RX ADMIN — VECURONIUM BROMIDE 4 MG: 1 INJECTION, POWDER, LYOPHILIZED, FOR SOLUTION INTRAVENOUS at 09:51

## 2024-05-31 RX ADMIN — PROPOFOL 20 MCG/KG/MIN: 10 INJECTION, EMULSION INTRAVENOUS at 10:22

## 2024-05-31 RX ADMIN — PHENYLEPHRINE HYDROCHLORIDE 50 MCG: 10 INJECTION INTRAVENOUS at 08:34

## 2024-05-31 RX ADMIN — PHENYLEPHRINE HYDROCHLORIDE 50 MCG: 10 INJECTION INTRAVENOUS at 07:40

## 2024-05-31 RX ADMIN — ALBUMIN (HUMAN): 12.5 SOLUTION INTRAVENOUS at 10:06

## 2024-05-31 RX ADMIN — PHENYLEPHRINE HYDROCHLORIDE 100 MCG: 10 INJECTION INTRAVENOUS at 09:52

## 2024-05-31 RX ADMIN — EPINEPHRINE 0.03 MCG/KG/MIN: 1 INJECTION INTRAMUSCULAR; INTRAVENOUS; SUBCUTANEOUS at 09:31

## 2024-05-31 RX ADMIN — MIDAZOLAM 2 MG: 1 INJECTION INTRAMUSCULAR; INTRAVENOUS at 09:28

## 2024-05-31 RX ADMIN — ALBUMIN (HUMAN): 12.5 SOLUTION INTRAVENOUS at 10:32

## 2024-05-31 RX ADMIN — SUGAMMADEX 200 MG: 100 INJECTION, SOLUTION INTRAVENOUS at 11:26

## 2024-05-31 RX ADMIN — ROCURONIUM BROMIDE 50 MG: 50 INJECTION, SOLUTION INTRAVENOUS at 07:32

## 2024-05-31 RX ADMIN — FENTANYL CITRATE 100 MCG: 50 INJECTION, SOLUTION INTRAMUSCULAR; INTRAVENOUS at 07:57

## 2024-05-31 RX ADMIN — SODIUM CHLORIDE, POTASSIUM CHLORIDE, SODIUM LACTATE AND CALCIUM CHLORIDE: 600; 310; 30; 20 INJECTION, SOLUTION INTRAVENOUS at 06:09

## 2024-05-31 RX ADMIN — NOREPINEPHRINE BITARTRATE 6.4 MCG: 1 INJECTION, SOLUTION, CONCENTRATE INTRAVENOUS at 09:55

## 2024-05-31 RX ADMIN — PHENYLEPHRINE HYDROCHLORIDE 50 MCG: 10 INJECTION INTRAVENOUS at 07:38

## 2024-05-31 RX ADMIN — NOREPINEPHRINE BITARTRATE 6.4 MCG: 1 INJECTION, SOLUTION, CONCENTRATE INTRAVENOUS at 10:11

## 2024-05-31 RX ADMIN — LIDOCAINE HYDROCHLORIDE 100 MG: 20 INJECTION, SOLUTION INFILTRATION; PERINEURAL at 07:32

## 2024-05-31 RX ADMIN — FENTANYL CITRATE 100 MCG: 50 INJECTION, SOLUTION INTRAMUSCULAR; INTRAVENOUS at 11:06

## 2024-05-31 RX ADMIN — VECURONIUM BROMIDE 2 MG: 1 INJECTION, POWDER, LYOPHILIZED, FOR SOLUTION INTRAVENOUS at 10:35

## 2024-05-31 RX ADMIN — NOREPINEPHRINE BITARTRATE 6.4 MCG: 1 INJECTION, SOLUTION, CONCENTRATE INTRAVENOUS at 10:44

## 2024-05-31 RX ADMIN — PROPOFOL 20 MG: 10 INJECTION, EMULSION INTRAVENOUS at 10:14

## 2024-05-31 RX ADMIN — FAMOTIDINE 20 MG: 20 TABLET, FILM COATED ORAL at 05:52

## 2024-05-31 RX ADMIN — SODIUM CHLORIDE, POTASSIUM CHLORIDE, SODIUM LACTATE AND CALCIUM CHLORIDE 250 ML: 600; 310; 30; 20 INJECTION, SOLUTION INTRAVENOUS at 16:45

## 2024-05-31 RX ADMIN — PHENYLEPHRINE HYDROCHLORIDE 50 MCG: 10 INJECTION INTRAVENOUS at 08:37

## 2024-05-31 RX ADMIN — MIDAZOLAM 1 MG: 1 INJECTION INTRAMUSCULAR; INTRAVENOUS at 08:09

## 2024-05-31 RX ADMIN — CHLORHEXIDINE GLUCONATE 10 ML: 1.2 SOLUTION ORAL at 05:52

## 2024-05-31 RX ADMIN — FENTANYL CITRATE 100 MCG: 50 INJECTION, SOLUTION INTRAMUSCULAR; INTRAVENOUS at 08:08

## 2024-05-31 RX ADMIN — SODIUM CHLORIDE, POTASSIUM CHLORIDE, SODIUM LACTATE AND CALCIUM CHLORIDE 250 ML: 600; 310; 30; 20 INJECTION, SOLUTION INTRAVENOUS at 18:43

## 2024-05-31 RX ADMIN — SENNOSIDES AND DOCUSATE SODIUM 1 TABLET: 50; 8.6 TABLET ORAL at 21:55

## 2024-05-31 RX ADMIN — OXYCODONE HYDROCHLORIDE 5 MG: 5 TABLET ORAL at 22:20

## 2024-05-31 RX ADMIN — VECURONIUM BROMIDE 4 MG: 1 INJECTION, POWDER, LYOPHILIZED, FOR SOLUTION INTRAVENOUS at 08:40

## 2024-05-31 RX ADMIN — SODIUM PHOSPHATE, MONOBASIC, MONOHYDRATE AND SODIUM PHOSPHATE, DIBASIC, ANHYDROUS 9 MMOL: 142; 276 INJECTION, SOLUTION INTRAVENOUS at 15:36

## 2024-05-31 RX ADMIN — FENTANYL CITRATE 200 MCG: 50 INJECTION, SOLUTION INTRAMUSCULAR; INTRAVENOUS at 07:32

## 2024-05-31 RX ADMIN — SODIUM CHLORIDE, POTASSIUM CHLORIDE, SODIUM LACTATE AND CALCIUM CHLORIDE: 600; 310; 30; 20 INJECTION, SOLUTION INTRAVENOUS at 12:11

## 2024-05-31 RX ADMIN — PHENYLEPHRINE HYDROCHLORIDE 50 MCG: 10 INJECTION INTRAVENOUS at 09:37

## 2024-05-31 RX ADMIN — INSULIN HUMAN 1 UNITS/HR: 1 INJECTION, SOLUTION INTRAVENOUS at 12:25

## 2024-05-31 RX ADMIN — DEXMEDETOMIDINE HYDROCHLORIDE 0.5 MCG/KG/HR: 100 INJECTION, SOLUTION INTRAVENOUS at 07:45

## 2024-05-31 RX ADMIN — Medication 0.5 MCG/KG/MIN: at 07:40

## 2024-05-31 RX ADMIN — AMINOCAPROIC ACID 5 G: 250 INJECTION, SOLUTION INTRAVENOUS at 07:30

## 2024-05-31 RX ADMIN — METHOCARBAMOL 750 MG: 750 TABLET ORAL at 17:12

## 2024-05-31 RX ADMIN — PHENYLEPHRINE HYDROCHLORIDE 50 MCG: 10 INJECTION INTRAVENOUS at 09:38

## 2024-05-31 RX ADMIN — PHENYLEPHRINE HYDROCHLORIDE 50 MCG: 10 INJECTION INTRAVENOUS at 10:52

## 2024-05-31 RX ADMIN — PHENYLEPHRINE HYDROCHLORIDE 50 MCG: 10 INJECTION INTRAVENOUS at 08:18

## 2024-05-31 RX ADMIN — MIDAZOLAM 1 MG: 1 INJECTION INTRAMUSCULAR; INTRAVENOUS at 06:46

## 2024-05-31 RX ADMIN — MAGNESIUM SULFATE HEPTAHYDRATE 2 G: 40 INJECTION, SOLUTION INTRAVENOUS at 16:16

## 2024-05-31 RX ADMIN — OXYCODONE HYDROCHLORIDE 5 MG: 5 TABLET ORAL at 12:43

## 2024-05-31 RX ADMIN — SODIUM CHLORIDE: 9 INJECTION, SOLUTION INTRAVENOUS at 07:30

## 2024-05-31 RX ADMIN — PHENYLEPHRINE HYDROCHLORIDE 100 MCG: 10 INJECTION INTRAVENOUS at 09:56

## 2024-05-31 RX ADMIN — FENTANYL CITRATE 100 MCG: 50 INJECTION, SOLUTION INTRAMUSCULAR; INTRAVENOUS at 10:14

## 2024-05-31 RX ADMIN — GABAPENTIN 100 MG: 100 CAPSULE ORAL at 17:11

## 2024-05-31 RX ADMIN — SODIUM CHLORIDE, POTASSIUM CHLORIDE, SODIUM LACTATE AND CALCIUM CHLORIDE 250 ML: 600; 310; 30; 20 INJECTION, SOLUTION INTRAVENOUS at 18:07

## 2024-05-31 RX ADMIN — PHENYLEPHRINE HYDROCHLORIDE 100 MCG: 10 INJECTION INTRAVENOUS at 09:46

## 2024-05-31 RX ADMIN — PROTAMINE SULFATE 170 MG: 10 INJECTION, SOLUTION INTRAVENOUS at 09:43

## 2024-05-31 RX ADMIN — PROPOFOL 40 MG: 10 INJECTION, EMULSION INTRAVENOUS at 07:32

## 2024-05-31 RX ADMIN — SODIUM CHLORIDE, POTASSIUM CHLORIDE, SODIUM LACTATE AND CALCIUM CHLORIDE 500 ML: 600; 310; 30; 20 INJECTION, SOLUTION INTRAVENOUS at 12:05

## 2024-05-31 RX ADMIN — LIDOCAINE HYDROCHLORIDE 1 ML: 10 INJECTION, SOLUTION INFILTRATION; PERINEURAL at 06:35

## 2024-05-31 RX ADMIN — SODIUM CHLORIDE 500 ML: 9 INJECTION, SOLUTION INTRAVENOUS at 20:59

## 2024-05-31 ASSESSMENT — ACTIVITIES OF DAILY LIVING (ADL)
ADLS_ACUITY_SCORE: 20
ADLS_ACUITY_SCORE: 24
ADLS_ACUITY_SCORE: 24
ADLS_ACUITY_SCORE: 18
ADLS_ACUITY_SCORE: 18
ADLS_ACUITY_SCORE: 21
ADLS_ACUITY_SCORE: 21
ADLS_ACUITY_SCORE: 20
ADLS_ACUITY_SCORE: 18
ADLS_ACUITY_SCORE: 18
ADLS_ACUITY_SCORE: 24
ADLS_ACUITY_SCORE: 24
ADLS_ACUITY_SCORE: 18
ADLS_ACUITY_SCORE: 35
ADLS_ACUITY_SCORE: 35
ADLS_ACUITY_SCORE: 18

## 2024-05-31 NOTE — ANESTHESIA CARE TRANSFER NOTE
Patient: Robson Gutierrez    Procedure: Procedure(s):  ASCENDING AORTA ANEURYSM REPAIR WITH HEMASHIELD PLATINUM GRAFT SIZE: 30MM, AND ON CARDIOPULMONARY PUMP OXYGENATOR  (INTRAOPERATIVE TRANSESOPHAGEAL ECHOCARDIOGRAM BY ANESTHESIOLOGIST)       Diagnosis: Ascending aortic aneurysm (H24) [I71.21]  Diagnosis Additional Information: No value filed.    Anesthesia Type:   General     Note:    Oropharynx: endotracheal tube in place, ventilatory support and oral gastic tube in place  Level of Consciousness: iatrogenic sedation      Independent Airway: airway patency not satisfactory and stable  Dentition: dentition unchanged  Vital Signs Stable: post-procedure vital signs reviewed and stable  Report to RN Given: handoff report given  Patient transferred to: ICU  Comments: .Patient connected to SpO2, EKG, and arterial blood pressure transport monitors and accompanied by CRNA, anesthesiologist, circulating RN, surgeon (fellow) to ICU room. Patient ventilated by anesthesiologist with ambu via ETT with O2 at 10 liters per minute during transport.     On arrival to ICU, endotracheal tube position unchanged, equal, bilateral breath sounds auscultated in ICU room, patient placed on ICU ventilator by respiratory therapist, teeth and oral mucosa intact and unchanged at handoff of care. At anesthesia handoff of care, clinical monitors and alarms on and functioning, report on patient's clinical status given to ICU RN, report on patient's clinical status given to intensivist, ICU staff questions answered  ICU Handoff: Call for PAUSE to initiate/utilize ICU HANDOFF, Identified Patient, Identified Responsible Provider, Reviewed the Pertinent Medical History, Discussed Surgical Course, Reviewed Intra-OP Anesthesia Management and Issues during Anesthesia, Set Expectations for Post Procedure Period and Allowed Opportunity for Questions and Acknowledgement of Understanding  Vitals:  Vitals Value Taken Time   /81 05/31/24 1129   Temp      Pulse 80 05/31/24 1133   Resp 18 05/31/24 1133   SpO2 100 % 05/31/24 1133   Vitals shown include unfiled device data.    Electronically Signed By: CORNELIO Oh CRNA  May 31, 2024  11:34 AM

## 2024-05-31 NOTE — PROGRESS NOTES
ICU Staff:    I examined the patient at the bedside in the [facility] ICU. I managed the patient at the bedside in the  [facility] ICU   for their critical illness.  I reviewed the patient's medical records, progress notes, and imaging studies.  I discussed the medical and surgical history of the patient, the physical findings, the labs and imaging studies, and the ICU care plan for the patient with the members of the ICU care team. 58 year old male with a history of ascending aortic aneurysm now s/p ascending aortic aneurysm repair. I discuss the patient's operative care with the staff anesthesiologist at the time of lo's arrival to [facility] today.      PAST MEDICAL HISTORY:  Past Medical History:   Diagnosis Date    Aortic aneurysm (H24)     Ascending aortic aneurysm (H24) 03/01/2018    4.7 cm on echo    Bicuspid aortic valve     Congenital insufficiency of aortic valve     Glaucoma     HTN (hypertension)     Pectus excavatum      PAST SURGICAL HISTORY:  Past Surgical History:   Procedure Laterality Date    CV CORONARY ANGIOGRAM N/A 4/16/2024    Procedure: Coronary Angiogram;  Surgeon: Zach Suárez MD;  Location: [facility] CARDIAC CATH LAB    CV LEFT HEART CATH N/A 4/16/2024    Procedure: Left Heart Catheterization;  Surgeon: Zach Suárez MD;  Location:  HEART CARDIAC CATH LAB        MEDICATIONS:  Current Facility-Administered Medications   Medication Dose Route Frequency Provider Last Rate Last Admin    [START ON 6/3/2024] acetaminophen (TYLENOL) tablet 650 mg  650 mg Oral Q4H PRN Zurdo Guerrero PA-C        acetaminophen (TYLENOL) tablet 975 mg  975 mg Oral Q8H Zurdo Guerrero PA-C        aminocaproic acid (AMICAR) 7.5 g in sodium chloride 0.9 % 150 mL infusion  1.25 g/hr Intravenous Continuous Zurdo Guerrero PA-C 25 mL/hr at 05/31/24 1214 1.25 g/hr at 05/31/24 1214    [START ON 6/1/2024] aspirin (ASA) chewable tablet 81 mg  81 mg Oral or NG Tube  Daily Zurdo Guerrero PA-C        [START ON 6/3/2024] bisacodyl (DULCOLAX) suppository 10 mg  10 mg Rectal Daily PRN Zurdo Guerrero PA-C        calcium carbonate (TUMS) chewable tablet 500 mg  500 mg Oral 4x Daily PRN Zurdo Guerrero PA-C        calcium gluconate 1 g in 50 mL in sodium chloride intermittent infusion  1 g Intravenous Q6H PRN Zurdo Guerrero PA-C        calcium gluconate 2 g in  mL intermittent infusion  2 g Intravenous Q6H PRN Zurdo Guerrero PA-C        calcium gluconate 3 g in sodium chloride 0.9 % 100 mL intermittent infusion  3 g Intravenous Q6H PRN Zurdo Guerrero PA-C        ceFAZolin (ANCEF) 1 g vial to attach to  ml bag for ADULT or 50 ml bag for PEDS  1 g Intravenous Q8H Zurdo Guerrero PA-C   1 g at 05/31/24 1645    dexmedeTOMIDine (PRECEDEX) 4 mcg/mL in sodium chloride 0.9 % 100 mL infusion  0.2-0.7 mcg/kg/hr Intravenous Continuous Zurdo Guerrero PA-C   Stopped at 05/31/24 1400    glucose gel 15-30 g  15-30 g Oral Q15 Min PRN Zurdo Guerrero PA-C        Or    dextrose 50 % injection 25-50 mL  25-50 mL Intravenous Q15 Min PRN Zurdo Guerrero PA-C        Or    glucagon injection 1 mg  1 mg Subcutaneous Q15 Min PRN Zurdo Guerrero PA-C        dorzolamide-timolol (COSOPT) ophthalmic solution 1 drop  1 drop Both Eyes BID Zurdo Guerrero PA-C        EPINEPHrine (ADRENALIN) 5 mg in  mL infusion  0.01-0.1 mcg/kg/min Intravenous Continuous PRN Zurdo Guerrero PA-C 4.7 mL/hr at 05/31/24 1828 0.02 mcg/kg/min at 05/31/24 1828    furosemide (LASIX) injection 20 mg  20 mg Intravenous Once PRN Zurdo Guerrero PA-C        gabapentin (NEURONTIN) capsule 100 mg  100 mg Oral TID Zurdo Guerrero PA-C   100 mg at 05/31/24 1711    [START ON 6/1/2024] heparin ANTICOAGULANT injection 5,000 Units  5,000 Units Subcutaneous Q8H Zurdo Guerrero PA-C        hydrALAZINE (APRESOLINE) injection 10 mg  10 mg Intravenous Q30 Min PRN Zurdo Guerrero PA-C        HYDROmorphone (DILAUDID)  injection 0.2 mg  0.2 mg Intravenous Q2H PRN Zurdo Guerrero PA-C        Or    HYDROmorphone (DILAUDID) injection 0.4 mg  0.4 mg Intravenous Q2H PRN Zurdo Guerrero PA-C        insulin regular (MYXREDLIN) 1 unit/mL infusion  0-24 Units/hr Intravenous Continuous Zurdo Guerrero PA-C 1 mL/hr at 05/31/24 1818 1 Units/hr at 05/31/24 1818    lactated ringers BOLUS 250 mL  250 mL Intravenous Q10 Min PRN Zurdo Guerrero PA-C   Restarted at 05/31/24 1843    lactated ringers infusion   Intravenous Continuous Zurdo Guerrero PA-C 30 mL/hr at 05/31/24 1817 Rate Change at 05/31/24 1817    latanoprost (XALATAN) 0.005 % ophthalmic solution 1 drop  1 drop Both Eyes At Bedtime Zurdo Guerrero PA-C        lidocaine (LMX4) cream   Topical Q1H PRN Zurdo Guerrero PA-C        lidocaine 1 % 0.1-1 mL  0.1-1 mL Other Q1H PRN Zurdo Guerrero PA-C        [START ON 6/2/2024] magnesium hydroxide (MILK OF MAGNESIA) suspension 30 mL  30 mL Oral Daily PRN Zurdo Guerrero PA-C        propofol (DIPRIVAN) infusion  5-75 mcg/kg/min Intravenous Continuous Olaf Arellano MD   Stopped at 05/31/24 1350    And    propofol (DIPRIVAN) bolus from bag or syringe pump  10 mg Intravenous Q15 Min PRN Olaf Arellano MD        And    Medication Instruction   Does not apply Continuous PRN Olaf Arellano MD        methocarbamol (ROBAXIN) tablet 750 mg  750 mg Oral Q6H PRN Zurdo Guerrero PA-C   750 mg at 05/31/24 1712    naloxone (NARCAN) injection 0.2 mg  0.2 mg Intravenous Q2 Min PRN Candis Torres MD        Or    naloxone (NARCAN) injection 0.4 mg  0.4 mg Intravenous Q2 Min PRN Candis Torres MD        Or    naloxone (NARCAN) injection 0.2 mg  0.2 mg Intramuscular Q2 Min PRN Candis Torres MD        Or    naloxone (NARCAN) injection 0.4 mg  0.4 mg Intramuscular Q2 Min PRN Candis Torres MD        norepinephrine (LEVOPHED) 16 mg in  mL infusion MAX CONC CENTRAL LINE  0.01-0.4 mcg/kg/min Intravenous  Continuous PRN Zurdo Guerrero PA-C        ondansetron (ZOFRAN ODT) ODT tab 4 mg  4 mg Oral Q6H PRN Zurdo Guerrero PA-C        Or    ondansetron (ZOFRAN) injection 4 mg  4 mg Intravenous Q6H PRN Zurdo Guerrero PA-C        oxyCODONE (ROXICODONE) tablet 5 mg  5 mg Oral Q4H PRN Zurdo Guerrero PA-C   5 mg at 05/31/24 1243    Or    oxyCODONE (ROXICODONE) tablet 10 mg  10 mg Oral Q4H PRN Zurdo Guerrero PA-C        pantoprazole (PROTONIX) 2 mg/mL suspension 40 mg  40 mg Oral or NG Tube Daily Zurdo Guerrero PA-C        Or    pantoprazole (PROTONIX) EC tablet 40 mg  40 mg Oral Daily Zurdo Guerrero PA-C   40 mg at 05/31/24 1807    phenylephrine (LYNN-SYNEPHRINE) 50 mg in NaCl 0.9 % 250 mL infusion  0.1-6 mcg/kg/min Intravenous Continuous Zurdo Guerrero PA-C 7 mL/hr at 05/31/24 1828 0.3 mcg/kg/min at 05/31/24 1828    [START ON 6/1/2024] polyethylene glycol (MIRALAX) Packet 17 g  17 g Oral Daily Zurdo Guerrero PA-C        prochlorperazine (COMPAZINE) injection 10 mg  10 mg Intravenous Q6H PRN Zurdo Guerrero PA-C        Or    prochlorperazine (COMPAZINE) tablet 10 mg  10 mg Oral Q6H PRN Zurdo Guerrero PA-C        Reason beta blocker order not selected   Does not apply DOES NOT GO TO MAR Zurdo Guerrero PA-C        senna-docusate (SENOKOT-S/PERICOLACE) 8.6-50 MG per tablet 1 tablet  1 tablet Oral BID Zurdo Guerrero PA-C        sodium chloride (PF) 0.9% PF flush 3 mL  3 mL Intracatheter Q8H Zurdo Guerrero PA-C        sodium chloride (PF) 0.9% PF flush 3 mL  3 mL Intracatheter q1 min prn Zurdo Guerrero PA-C        sodium phosphate 9 mmol in 250 mL NS intermittent infusion  9 mmol Intravenous Once Ricardo Dunn MD   9 mmol at 05/31/24 1536    vasopressin 0.2 units/mL in NS (PITRESSIN) standard conc infusion  0.5-4 Units/hr Intravenous Continuous PRN Zurdo Guerrero PA-C            ALLERGIES:  No Known Allergies     SOCIAL HISTORY:  Social History     Socioeconomic History    Marital status:       Spouse name: None    Number of children: None    Years of education: None    Highest education level: None   Tobacco Use    Smoking status: Never    Smokeless tobacco: Never   Substance and Sexual Activity    Alcohol use: Yes     Comment: occasional    Drug use: No     Social Determinants of Health     Financial Resource Strain: Not At Risk (2/27/2024)    Received from [facility]    Financial Resource Strain     Is it hard for you to pay for the very basics like food, housing, medical care or heating?: No   Food Insecurity: Not At Risk (2/27/2024)    Received from [facility]    Food Insecurity     Does your food run out before you have the money to buy more?: No   Transportation Needs: Not At Risk (2/27/2024)    Received from [facility]    Transportation Needs     Does a lack of transportation keep you from your medical appointments or from getting your medications?: No       FAMILY HISTORY:  History reviewed. No pertinent family history.     PHYSICAL EXAM:  Vital Signs: /81   Pulse 81   Temp 99.3  F (37.4  C)   Resp 18   Ht 1.829 m (6')   Wt 78.9 kg (173 lb 15.1 oz)   SpO2 91%   BMI 23.59 kg/m      GEN: The patient is intubated and sedated in the ICU.  The patient's wife and son were at the bedside.      HEENT: Pupui 2 mm bilaterally    Chest: CTA bilaterally, pectus excavatum mild.    Cor: RRR no murmur    ABD: soft and no masses.      Ext: warm and well perfused.     Neuro: intubated and sedated.      A/P: The patient is a    No acute issues.    Neuro:    Cardiac Will wean the pressors. MAP goal >65 mmHg    Pulm: The patient is intubated and supported.  Will wean to extubate.  CT are to suction; will follow the output.    GI: NPO    : Will follow the urine output and serum Cr.     HEME: The PPT was elevated and an additional dose of protamine was given.  Will transfuse for Hb <7 g/dL    ENDO: Monitor blood glucose, goal <180  mg/dL    ID:  continue perioperative prophylaxis    CODE: Full code.     ICU Staff:    I examined the patient at the bedside in the  [facility] ICU. I managed the patient at the bedside in the  [facility] for their critical illness.  I reviewed the patient's medical records, progress notes, and imaging studies.  I discussed the medical and surgical history of the patient, the physical findings, the labs and imaging studies, and the ICU care plan for the patient with the members of the interdisciplinary ICU care team. I personally examined and evaluated the patient today in the  [facility]  . The patient remains critically ill today.  All of the ICU patient care orders and treatments were placed at my direction. I personally managed the patient's ICU supportive measures that were required as the patient's critical illness creates a continuous threat for loss of life for the patient.  Key critical care decisions were made by me today to maintain life-saving effective ICU supportive care.  I spent 35 minutes providing critical care services at the bedside, and on the critical care unit, evaluating the patient, directing care, and reviewing the patient's laboratory values and the patient's radiology imaging reports associated with the patient's critical illness.  I have evaluated all laboratory values and imaging studies from the past 24 hours.. I actively assessed this acute critically ill patient and I personally provided supportive interventions that were required because the patient has acute and persistent imminently life-threatening organ system failure. The critical care provided required high complexity decision making and clinical evaluation as the patient has an acute critical illness that impairs one or more vital organs. The patient has a high probability of imminent or life-threatening deterioration. I personally spent 35 minutes of Critical Care  Time which was exclusive of any time required to perform any bedside procedures.  The Critical Care Time was required to personally provide and direct critical care services at the bedside and on the critical care unit for this acutely critically ill patient. Critical care issues, wean to extubate the patient, will follow the CT output, will follow the patient hemodynamics and provide additional fluid resuscitation.  I personally managed the patient's sedation, pain control and analgesia, metabolic abnormalities, nutritional status, and vasoactive medications.    Ricardo Dunn MD, PhD

## 2024-05-31 NOTE — CONSULTS
CARDIAC SURGERY NUTRITION CONSULT    Received standing order to assess and educate patient.    Will follow and complete assessment once patient is extubated and/or is transferred to medical unit.    Patient will receive nutrition education during the Outpatient Cardiac Rehab Program (nutrition classes/dietitian counseling).     Susan Swain RD, LD  Clinical Dietitian - [facility]

## 2024-05-31 NOTE — H&P
Critical Care Note  2024    Name: Robson Gutierrez MRN: [id number]   Age: 58 year-old : 1965     Hospital Day #: 0  ICU DAY #: 0    MV DAY #: 0      Problem List:   Active Problems:    S/P ascending aortic replacement    Status post ascending aortic aneurysm repair    Interval History:   Doing well post-operatively. GCS 11T while on Precedex gtt. No focal deficits. Intrinsic normal sinus rhythm in 80s BPM. On epinephrine gtt and phenylephrine gtt. Decreasing pressor requirement. CXR without PTX. Chest drains in place to suction without leak and serosanguinous output of approximately 200 mL. Doing well on spontaneous pressure support trial with approximately RSBI 20 and NIF -35.     Assessment & Plan:   Robson Gutierrez is a 58 year old male with a history of ascending aortic aneurysm now s/p:    - 2024 Ascending aortic aneurysm repair (see CTS operative note)     I have personally reviewed the daily labs, imaging studies, & cultures and discussed the case with referring and consulting physicians.     My assessment and plan by systems for this patient is as follows:    Neurologic & Psychiatric:   1. Sedation: Precedex gtt  2. Analgesia: Mult-modal  - Neuro checks  - RASS goal, 0 to -1  - Delirium precautions    Cardiovascular:   1.Hemodynamics: intrinsic normal sinus rhythm in 80s BPM, on epinephrine gtt & phenylephrine gtt, decreasing requirement s/p  ascending aortic aneurysm repair  - Monitor telemetry  - MAP goal >65 mmHg  - Wean pressor as able    Pulmonary:   1. CXR without PTX, chest drains in place to suction without leak and serosanguinous output of approximately 200 mL, doing well on spontaneous pressure support trial with approximately RSBI 20 and NIF -35, ABG 7.34/45/155/24/-1.6  - Extubate  - Chest drains management per CTS, monitor output  - Encourage IS, atelectasis prophylaxis  - Monitor pulse oximetry, SpO2 goal >92%  - Appreciate RT    GI/Nutrition:   1. NPO currently  - Plan for  bedside nursing swallow assessment & ADAT  - Appreciate nutrition  - Bowel regimen    Renal/Fluids/Electrolytes:   1. Adequate UOP, 1418 mL, clear urine in catheter drainage bag, Cr normal at 0.79 mg/dL  - Monitor UOP  - Replace electrolytes PRN     Infectious Disease:   1. WBC normal 9.1 K/uL, on perioperative prophylaxis ABX  - Monitor temperature and WBCs    Endocrine:   1. No known history of DM, now with likely stress hyperglycemia, on insulin gtt  - Monitor blood glucose, goal <180 mg/dL    Hematology/Oncology:   1. ABLA and thrombocytopenia 2/2 multifactorial including surgical losses, dilutional, Hb 11.4 g/dL,  K/uL  - Monitor Hb & PLT, transfuse for Hb <7 g/dL, if symptomatic & <8, and/or if bleeding    Access:   1. Venous access - RIJ MAC/CVC, LUE PIV  2. Arterial access - right radial  - ETT, CT x 3, martins    ICU Prophylaxis:   1. DVT: SCDs & SQH  2. VAP: HOB 30 degrees, chlorhexidine rinse  3. Stress Ulcer: PPI/H2 blocker  4. Restraints: Non-violent soft two point restraints required and necessary for patient safety and continued cares and good effect as patient continues to pull at necessary lines, tubes despite education and distraction, will readdress daily   5. Wound care: Pressure injury prevention protocol  7. Family Communication: Updated patient's spouse at bedside today  8. Disposition/Plan of care: Planning/continue critical cares    Plan was discussed with the attending intensivist (Dr. Dunn) and multidisciplinary critical care team.    Olaf Arellano MD, MPH, DABS  PGY-6 Trauma & Surgical Critical Care Fellow    Medications:     Current Facility-Administered Medications   Medication Dose Route Frequency Provider Last Rate Last Admin    acetaminophen (TYLENOL) tablet 975 mg  975 mg Oral Q8H Zurdo Guerrero PA-C        [START ON 6/1/2024] aspirin (ASA) chewable tablet 81 mg  81 mg Oral or NG Tube Daily Zurdo Guerrero PA-C        ceFAZolin (ANCEF) 1 g vial to attach to  ml  bag for ADULT or 50 ml bag for PEDS  1 g Intravenous Q8H Zurdo Guerrero PA-C        dorzolamide-timolol (COSOPT) ophthalmic solution 1 drop  1 drop Both Eyes BID Zurdo Guerrero PA-C        gabapentin (NEURONTIN) capsule 100 mg  100 mg Oral TID Zurdo Guerrero PA-C        [START ON 6/1/2024] heparin ANTICOAGULANT injection 5,000 Units  5,000 Units Subcutaneous Q8H Zurdo Guerrero PA-C        latanoprost (XALATAN) 0.005 % ophthalmic solution 1 drop  1 drop Both Eyes BID Zurdo Guerrero PA-C        pantoprazole (PROTONIX) 2 mg/mL suspension 40 mg  40 mg Oral or NG Tube Daily Zurdo Guerrero PA-C        Or    pantoprazole (PROTONIX) EC tablet 40 mg  40 mg Oral Daily Zurdo Guerrero PA-C        [START ON 6/1/2024] polyethylene glycol (MIRALAX) Packet 17 g  17 g Oral Daily Zurdo Guerrero PA-C        senna-docusate (SENOKOT-S/PERICOLACE) 8.6-50 MG per tablet 1 tablet  1 tablet Oral BID Zurdo Guerrero PA-C        sodium chloride (PF) 0.9% PF flush 3 mL  3 mL Intracatheter Q8H Zurdo Guerrero PA-C         Current Facility-Administered Medications   Medication Dose Route Frequency Provider Last Rate Last Admin    aminocaproic acid (AMICAR) 7.5 g in sodium chloride 0.9 % 150 mL infusion  1.25 g/hr Intravenous Continuous Zurdo Guerrero PA-C 25 mL/hr at 05/31/24 1214 1.25 g/hr at 05/31/24 1214    dexmedeTOMIDine (PRECEDEX) 4 mcg/mL in sodium chloride 0.9 % 100 mL infusion  0.2-0.7 mcg/kg/hr Intravenous Continuous Zurdo Guerrero PA-C        EPINEPHrine (ADRENALIN) 5 mg in  mL infusion  0.01-0.1 mcg/kg/min Intravenous Continuous PRN Zurdo Guerrero PA-C 4.7 mL/hr at 05/31/24 1218 0.02 mcg/kg/min at 05/31/24 1218    insulin regular (MYXREDLIN) 1 unit/mL infusion  0-24 Units/hr Intravenous Continuous Zurdo Guerrero PA-C 0.5 mL/hr at 05/31/24 1258 0.5 Units/hr at 05/31/24 1258    lactated ringers infusion   Intravenous Continuous Zurdo Guerrero PA-C 30 mL/hr at 05/31/24 1211 New Bag at 05/31/24 1211     propofol (DIPRIVAN) infusion  5-75 mcg/kg/min Intravenous Continuous Olaf Arellano MD        And    Medication Instruction   Does not apply Continuous PRN Olaf Arellano MD        norepinephrine (LEVOPHED) 16 mg in  mL infusion MAX CONC CENTRAL LINE  0.01-0.4 mcg/kg/min Intravenous Continuous PRN Zurdo Guerrero PA-C        phenylephrine (LYNN-SYNEPHRINE) 50 mg in NaCl 0.9 % 250 mL infusion  0.1-6 mcg/kg/min Intravenous Continuous Zrudo Guerrero PA-C 21 mL/hr at 05/31/24 1212 0.9 mcg/kg/min at 05/31/24 1212    Reason beta blocker order not selected   Does not apply DOES NOT GO TO Zurdo Chawla PA-C        vasopressin 0.2 units/mL in NS (PITRESSIN) standard conc infusion  0.5-4 Units/hr Intravenous Continuous PRN Zurdo Guerrero PA-C           Physical Examination:   Temp:  [97.2  F (36.2  C)-97.9  F (36.6  C)] 97.9  F (36.6  C)  Pulse:  [62-81] 77  Resp:  [16-24] 18  BP: (106-116)/(81-95) 106/81  MAP:  [63 mmHg-79 mmHg] 69 mmHg  Arterial Line BP: ()/(37-65) 93/55  FiO2 (%):  [40 %] 40 %  SpO2:  [87 %-100 %] 96 %    Intake/Output Summary (Last 24 hours) at 5/31/2024 1301  Last data filed at 5/31/2024 1211  Gross per 24 hour   Intake 3530 ml   Output 1390 ml   Net 2140 ml     Wt Readings from Last 4 Encounters:   05/31/24 78.9 kg (173 lb 15.1 oz)   04/16/24 78 kg (172 lb)   04/10/24 79.4 kg (175 lb)   10/30/23 79.6 kg (175 lb 8 oz)     Arterial Line BP: ()/(37-65) 93/55  MAP:  [63 mmHg-79 mmHg] 69 mmHg  BP - Mean:  [90] 90  CVP:  [0 mmHg] 0 mmHg  Vent Mode: CMV/AC  (Continuous Mandatory Ventilation/ Assist Control)  FiO2 (%): 40 %  Resp Rate (Set): 18 breaths/min  Tidal Volume (Set, mL): 500 mL  PEEP (cm H2O): 5 cmH2O  Resp: 18    Recent Labs   Lab 05/31/24  1139 05/31/24  0950 05/31/24  0913 05/31/24  0844   PH 7.38 7.44 7.39 7.43   PCO2 41 38 45 42   PO2 183* 530* 259* 352*   HCO3 24 26 28 28   O2PER 40 100.0 65.0 75.0     GENERAL: No distress   HEENT: ETT in place  PULMONARY: On MV    CV: Intrinsic NSR, 80s BPM, on epinephrine gtt & phenylephrine gtt to maintain MAP >65, palpable peripheral pulses  ABDOMEN: Soft, non-distended, non-tender  EXTREMITIES: Moves all, no deformities  NEURO: PERRL, GCS [id number] best score = 11T, no obvious focal deficits  SKIN: No obvious rash    Data:   All data and imaging reviewed     ROUTINE ICU LABS (Last four results)  CMP  Recent Labs   Lab 05/31/24  1138 05/31/24  0950 05/31/24  0942 05/31/24  0913 05/31/24  0751 05/31/24  0549    141 139 141   < > 140   POTASSIUM 3.7 4.0 4.2 4.2   < > 3.5   CHLORIDE 111*  --  108*  --   --  103   CO2 23 --  24  --   --  27   ANIONGAP 8  --  7  --   --  10   *  163* 117* 120* 111*   < > 103*   BUN 13.6  --  14.5  --   --  18.1   CR 0.79  --  0.75  --   --  0.98   GFRESTIMATED >90  --  >90  --   --  88   HALEY 8.1*  --  9.2  --   --  9.1   MAG 2.0  --   --   --   --   --    PHOS 2.0*  --   --   --   --   --    PROTTOTAL 5.0*  --   --   --   --  7.2   ALBUMIN 3.2*  --   --   --   --  3.8   BILITOTAL 0.9  --   --   --   --  0.4   ALKPHOS 52  --   --   --   --  93   AST 28  --   --   --   --  19   ALT 11  --   --   --   --  16    < > = values in this interval not displayed.     CBC  Recent Labs   Lab 05/31/24  1138 05/31/24  0950 05/31/24  0942 05/31/24  0913 05/31/24  0751 05/31/24  0549   WBC 9.1  --  7.3  --   --  5.2   RBC 3.79*  --  3.45*  --   --  4.57   HGB 11.4* 10.6* 10.4* 10.3*   < > 13.6   HCT 33.5*  --  30.2*  --   --  40.4   MCV 88  --  88  --   --  88   MCH 30.1  --  30.1  --   --  29.8   MCHC 34.0  --  34.4  --   --  33.7   RDW 12.5  --  12.4  --   --  12.3   *  --  135*  --   --  194    < > = values in this interval not displayed.     INR  Recent Labs   Lab 05/31/24  1138 05/31/24  0942 05/31/24  0549   INR 1.46* 1.42* 1.08     Arterial Blood Gas  Recent Labs   Lab 05/31/24  1139 05/31/24  0950 05/31/24  0913 05/31/24  0844   PH 7.38 7.44 7.39 7.43   PCO2 41 38 45 42   PO2 183* 530* 259* 352*  "  HCO3 24 26 28 28   O2PER 40 100.0 65.0 75.0       All cultures:  No results for input(s): \"CULT\" in the last 168 hours.  Recent Results (from the past 24 hour(s))   XR Chest Port 1 View    Narrative    CHEST ONE VIEW May 31, 2024 12:21 PM     HISTORY: Postoperative CVTS Surgery.    COMPARISON: April 16, 2024.      Impression    IMPRESSION: Endotracheal tube tip 3.2 cm from the jacky. Right IJ  line noted with tip in the low SVC. Chest tubes in place, no gross  pneumothorax evident in supine position. No definite infiltrates.       History:     Past Medical History:   Diagnosis Date    Aortic aneurysm (H24)     Ascending aortic aneurysm (H24) 03/01/2018    4.7 cm on echo    Bicuspid aortic valve     Congenital insufficiency of aortic valve     Glaucoma     HTN (hypertension)     Pectus excavatum      Past Surgical History:   Procedure Laterality Date    CV CORONARY ANGIOGRAM N/A 4/16/2024    Procedure: Coronary Angiogram;  Surgeon: Zach Suárez MD;  Location:  [facility]    CV LEFT HEART CATH N/A 4/16/2024    Procedure: Left Heart Catheterization;  Surgeon: Zach Suárez MD;  Location:  [facility]     Current Facility-Administered Medications   Medication Dose Route Frequency Provider Last Rate Last Admin    [START ON 6/3/2024] acetaminophen (TYLENOL) tablet 650 mg  650 mg Oral Q4H PRN Zurdo Guerrero PA-C        acetaminophen (TYLENOL) tablet 975 mg  975 mg Oral Q8H Zurdo Guerrero PA-C        aminocaproic acid (AMICAR) 7.5 g in sodium chloride 0.9 % 150 mL infusion  1.25 g/hr Intravenous Continuous Zurdo Guerrero PA-C 25 mL/hr at 05/31/24 1214 1.25 g/hr at 05/31/24 1214    [START ON 6/1/2024] aspirin (ASA) chewable tablet 81 mg  81 mg Oral or NG Tube Daily Zurdo Guerrero PA-C        [START ON 6/3/2024] bisacodyl (DULCOLAX) suppository 10 mg  10 mg Rectal Daily PRN Zurdo Guerrero PA-C        calcium carbonate (TUMS) chewable tablet 500 mg  500 mg Oral 4x " Daily PRN Zurdo Guerrero PA-C        calcium gluconate 1 g in 50 mL in sodium chloride intermittent infusion  1 g Intravenous Q6H PRN Zurdo Guerrero PA-C        calcium gluconate 2 g in  mL intermittent infusion  2 g Intravenous Q6H PRN Zurdo Guerrero PA-C        calcium gluconate 3 g in sodium chloride 0.9 % 100 mL intermittent infusion  3 g Intravenous Q6H PRN Zurdo Guerrero PA-C        ceFAZolin (ANCEF) 1 g vial to attach to  ml bag for ADULT or 50 ml bag for PEDS  1 g Intravenous Q8H Zurdo Guerrero PA-C        dexmedeTOMIDine (PRECEDEX) 4 mcg/mL in sodium chloride 0.9 % 100 mL infusion  0.2-0.7 mcg/kg/hr Intravenous Continuous Zurdo Guerrero PA-C        glucose gel 15-30 g  15-30 g Oral Q15 Min PRN Zurdo Guerrero PA-C        Or    dextrose 50 % injection 25-50 mL  25-50 mL Intravenous Q15 Min PRN Zurdo Guerrero PA-C        Or    glucagon injection 1 mg  1 mg Subcutaneous Q15 Min PRN Zurdo Guerrero PA-C        dorzolamide-timolol (COSOPT) ophthalmic solution 1 drop  1 drop Both Eyes BID Zurdo Guerrero PA-C        EPINEPHrine (ADRENALIN) 5 mg in  mL infusion  0.01-0.1 mcg/kg/min Intravenous Continuous PRN Zurdo Guerrero PA-C 4.7 mL/hr at 05/31/24 1218 0.02 mcg/kg/min at 05/31/24 1218    furosemide (LASIX) injection 20 mg  20 mg Intravenous Once PRN Zurdo Guerrero PA-C        gabapentin (NEURONTIN) capsule 100 mg  100 mg Oral TID Zurdo Guerrero PA-C        [START ON 6/1/2024] heparin ANTICOAGULANT injection 5,000 Units  5,000 Units Subcutaneous Q8H Zurdo Guerrero PA-C        hydrALAZINE (APRESOLINE) injection 10 mg  10 mg Intravenous Q30 Min PRN Zurdo Guerrero PA-C        HYDROmorphone (DILAUDID) injection 0.2 mg  0.2 mg Intravenous Q2H PRN Zurdo Guerrero PA-C        Or    HYDROmorphone (DILAUDID) injection 0.4 mg  0.4 mg Intravenous Q2H PRN Zurdo Guerrero PA-C        insulin regular (MYXREDLIN) 1 unit/mL infusion  0-24 Units/hr Intravenous Continuous Cesar,  Zurdo CARIAS PA-C 0.5 mL/hr at 05/31/24 1258 0.5 Units/hr at 05/31/24 1258    lactated ringers BOLUS 250 mL  250 mL Intravenous Q10 Min PRN Zurdo Guerrero PA-C        lactated ringers infusion   Intravenous Continuous Zurdo Guerrero PA-C 30 mL/hr at 05/31/24 1211 New Bag at 05/31/24 1211    latanoprost (XALATAN) 0.005 % ophthalmic solution 1 drop  1 drop Both Eyes BID Zurdo Guerrero PA-C        lidocaine (LMX4) cream   Topical Q1H PRN Zurdo Guerrero PA-C        lidocaine 1 % 0.1-1 mL  0.1-1 mL Other Q1H PRN Zurdo Guerrero PA-C        [START ON 6/2/2024] magnesium hydroxide (MILK OF MAGNESIA) suspension 30 mL  30 mL Oral Daily PRN Zurdo Guerrero PA-C        propofol (DIPRIVAN) infusion  5-75 mcg/kg/min Intravenous Continuous Olaf Arellano MD        And    propofol (DIPRIVAN) bolus from bag or syringe pump  10 mg Intravenous Q15 Min PRN Olaf Arellano MD        And    Medication Instruction   Does not apply Continuous PRN Olaf rAellano MD        methocarbamol (ROBAXIN) tablet 750 mg  750 mg Oral Q6H PRN Zurdo Guerrero PA-C        naloxone (NARCAN) injection 0.2 mg  0.2 mg Intravenous Q2 Min PRN Candis Torres MD        Or    naloxone (NARCAN) injection 0.4 mg  0.4 mg Intravenous Q2 Min PRN Candis Torres MD        Or    naloxone (NARCAN) injection 0.2 mg  0.2 mg Intramuscular Q2 Min PRN Candis Torres MD        Or    naloxone (NARCAN) injection 0.4 mg  0.4 mg Intramuscular Q2 Min PRN Candis Torres MD        norepinephrine (LEVOPHED) 16 mg in  mL infusion MAX CONC CENTRAL LINE  0.01-0.4 mcg/kg/min Intravenous Continuous PRN Zurdo Guerrero PA-C        ondansetron (ZOFRAN ODT) ODT tab 4 mg  4 mg Oral Q6H PRN Zurdo Guerrero PA-C        Or    ondansetron (ZOFRAN) injection 4 mg  4 mg Intravenous Q6H PRN Zurdo Guerrero PA-C        oxyCODONE (ROXICODONE) tablet 5 mg  5 mg Oral Q4H PRN Zurdo Guerrero PA-C   5 mg at 05/31/24 1243    Or    oxyCODONE  (ROXICODONE) tablet 10 mg  10 mg Oral Q4H PRN Zurdo Guerrero PA-C        pantoprazole (PROTONIX) 2 mg/mL suspension 40 mg  40 mg Oral or NG Tube Daily Zurdo Guerrero PA-C        Or    pantoprazole (PROTONIX) EC tablet 40 mg  40 mg Oral Daily Zurdo Guerrero PA-C        phenylephrine (LYNN-SYNEPHRINE) 50 mg in NaCl 0.9 % 250 mL infusion  0.1-6 mcg/kg/min Intravenous Continuous Zurdo Guerrero PA-C 21 mL/hr at 05/31/24 1212 0.9 mcg/kg/min at 05/31/24 1212    [START ON 6/1/2024] polyethylene glycol (MIRALAX) Packet 17 g  17 g Oral Daily Zurdo Guerrero PA-C        prochlorperazine (COMPAZINE) injection 10 mg  10 mg Intravenous Q6H PRN Zurdo Guerrero PA-C        Or    prochlorperazine (COMPAZINE) tablet 10 mg  10 mg Oral Q6H PRN Zurdo Guerrero PA-C        Reason beta blocker order not selected   Does not apply DOES NOT GO TO MAR Zurdo Guerrero PA-C        senna-docusate (SENOKOT-S/PERICOLACE) 8.6-50 MG per tablet 1 tablet  1 tablet Oral BID Zurdo Guerrero PA-C        sodium chloride (PF) 0.9% PF flush 3 mL  3 mL Intracatheter Q8H Zurdo Guerrero PA-C        sodium chloride (PF) 0.9% PF flush 3 mL  3 mL Intracatheter q1 min prn Zurdo Guerrero PA-C        vasopressin 0.2 units/mL in NS (PITRESSIN) standard conc infusion  0.5-4 Units/hr Intravenous Continuous PRN Zurdo Guerrero PA-C          No Known Allergies  Social History     Socioeconomic History    Marital status:      Spouse name: Not on file    Number of children: Not on file    Years of education: Not on file    Highest education level: Not on file   Occupational History    Not on file   Tobacco Use    Smoking status: Never    Smokeless tobacco: Never   Substance and Sexual Activity    Alcohol use: Yes     Comment: occasional    Drug use: No    Sexual activity: Not on file   Other Topics Concern    Parent/sibling w/ CABG, MI or angioplasty before 65F 55M? Not Asked   Social History Narrative    Not on file     Social Determinants of  Health     Financial Resource Strain: Not At Risk (2/27/2024)    [facility]    Financial Resource Strain     Is it hard for you to pay for the very basics like food, housing, medical care or heating?: No   Food Insecurity: Not At Risk (2/27/2024)    [facility]    Food Insecurity     Does your food run out before you have the money to buy more?: No   Transportation Needs: Not At Risk (2/27/2024)    [facility]    Transportation Needs     Does a lack of transportation keep you from your medical appointments or from getting your medications?: No   Physical Activity: Not on file   Stress: Not on file   Social Connections: Not on file   Interpersonal Safety: Not on file   Housing Stability: Not on file     History reviewed. No pertinent family history.

## 2024-05-31 NOTE — ANESTHESIA PROCEDURE NOTES
Airway       Patient location during procedure: OR       Procedure Start/Stop Times: 5/31/2024 7:35 AM  Staff -        Anesthesiologist:  Nahun Pendleton MD       CRNA: Maryan Begum APRN CRNA       Performed By: CRNA  Consent for Airway        Urgency: elective  Indications and Patient Condition       Indications for airway management: beckie-procedural       Induction type:intravenous       Mask difficulty assessment: 2 - vent by mask + OA or adjuvant +/- NMBA    Final Airway Details       Final airway type: endotracheal airway       Successful airway: ETT - single  Endotracheal Airway Details        ETT size (mm): 8.0       Cuffed: yes       Successful intubation technique: video laryngoscopy       VL Blade Size: Daly 4       Grade View of Cords: 1       Adjucts: stylet       Position: Right       Measured from: lips       Secured at (cm): 23       Bite block used: None    Post intubation assessment        Placement verified by: capnometry, equal breath sounds and chest rise        Number of attempts at approach: 1       Secured with: tape       Ease of procedure: easy       Dentition: Intact and Unchanged    Medication(s) Administered   Medication Administration Time: 5/31/2024 7:35 AM

## 2024-05-31 NOTE — ANESTHESIA PROCEDURE NOTES
Central Line/PA Catheter Placement    Pre-Procedure   Staff -        Anesthesiologist:  Nahun Pendleton MD       Performed By: Anesthesiologist       Location: pre-op       Pre-Anesthestic Checklist: patient identified, IV checked, site marked, risks and benefits discussed, informed consent, monitors and equipment checked, pre-op evaluation and at physician/surgeon's request  Timeout:       Correct Patient: Yes        Correct Procedure: Yes        Correct Site: Yes        Correct Position: Yes        Correct Laterality: Yes   Line Placement:   This line was placed Pre Induction starting at 5/31/2024 6:45 AM and ending at 5/31/2024 6:58 AM    Procedure   Procedure: central line, new line and elective       Laterality: right       Insertion Site: internal jugular.       Patient Position: Trendelenburg  Sterile Prep        All elements of maximal sterile barrier technique followed       Patient Prep/Sterile Barriers: draped, hand hygiene, gloves , hat , mask , draped, gown, sterile gel and probe cover       Skin prep: Chloraprep  Insertion/Injection        Local skin infiltrated with 5 mL of 1% lidocaine.        Technique: ultrasound guided and Seldinger Technique        1. Ultrasound was used to evaluate the access site.       2. Vein evaluated via ultrasound for patency/adequacy.       3. Using real-time ultrasound the needle/catheter was observed entering the artery/vein.       4. Permanent image was captured and entered into the patient's record.       5. The visualized structures were anatomically normal.       6. There were no apparent abnormal pathologic findings.       Introducer Type: 9 Fr, 7 cm        Type: Introducer  Narrative         Secured by: suture       Tegaderm and Biopatch dressing used.       Complications: None apparent,        blood aspirated from all lumens,        All lumens flushed: Yes       Verification method: Ultrasound, Placement to be verified post-op and X-ray (CXR in ICU)    Comments:  CXR to be taken in ICU, tip determination will be determined and catheter adjusted for appropriate tip position.    Ultrasound Interpretation:  1. Ultrasound guidance was used to evaluate potential access sites.  2. Ultrasound was also used to verify the patency of the vessel specified above.   3. Ultrasound was used to visualize the needle entering the vessel.   4. The visualized structures were anatomically normal.  5. There were no apparent abnormal pathological findings.  6. A permanent ultrasound image was saved in the patient's record.

## 2024-05-31 NOTE — OP NOTE
Date of Service: May 31st, 2024    Referring Cardiologist: Aretha Arzate MD    Preoperative Diagnosis: ascending aortic aneurysm    Postoperative Diagnosis: ascending aortic aneurysm    Surgeon: Candis Torres MD    Assistant: Von Sanchez MD, TASHA [place]    Name of Operation: ascending aorta replacement with 30 mm Hemashield interposition graft, intraoperative MAYO    Anesthesia: general orotracheal    Indications for Procedure: Mr. 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  His aortic valve, however, had no significant disease.  His aortic root was also normal.  He was taken to the operating today for an ascending aorta replacement.    Operative Findings: The patient had an overall normal LV systolic size and function.  His aortic valve was bicuspid, Luis type I, with fusion of the right and left coronary cusps.  He had a complete raphae that was mildly calcified but otherwise the valve appeared to open well and there was only trivial insufficiency.  His aortic root anatomy was also normal.    Description of the Procedure: After informed consent was obtained, the patient was brought onto the operating room and was placed on the OR table in the supine position.  Intravenous and intra-arterial lines were begun.  While monitoring his blood pressure and EKG tracing, he was anesthetized and intubated using a single-lumen endotracheal tube.  His entire chest, abdomen, both groins and legs were prepped down to the toes using multiple layers of DuraPrep.  He was draped in a sterile field.  A median sternotomy was performed and the sternal edges were retracted laterally.  The pericardium was opened to suspend the heart in a pericardial cradle.  The patient was fully heparinized.  The ascending aorta was cannulated high near the aortic arch and the right atrial appendage was also cannulated.  A retrograde cardioplegia catheter was placed  into the coronary sinus without difficulty.  An antegrade needle/aortic root vent was placed in the ascending aorta as well.  After appropriate ACT level was achieved, cardiopulmonary bypass was established and the patient was kept normothermic during the entire operation.  Carbon dioxide was flooded into the chest to prevent air embolism.  An LV vent was placed via the right superior pulmonary vein.  The aorta was then crossclamped and antegrade cold blood cardioplegia was given to fully arrest the heart.  The patient went into good diastolic arrest without any LV distention.  Following this, intermittent retrograde cardioplegia doses were given on average every 15 minutes fo myocardial protection while the aorta was crossclamped.    The aorta was transected immediately proximal to the aortic cross-clamp and also at the level of the sinotubular junction.  A 30 mm Hemashield graft was then brought to the field, and the graft to proximal aortic anastomosis was performed at the level of the sinotubular junction in an end-to-end fashion using running 3-0 Prolene with outside felt and BioGlue reinforcement.  The distal end of the graft was cut and beveled to the appropriate length, and the graft to distal aortic anastomosis was performed next, also in an end-to-fashion using running 3-0 Prolene with outside felt and BioGlue reinforcement.  A root vent was placed in the graft for de-airing purposes.  The patient was placed in Trendelenburg position.  Retrograde hotshot was given and the aortic cross-clamp was removed.  Aortic cross-clamp time was 45 minutes.  The ascending aorta and the left ventricle were continuously vented to de-air the heart.  The patient was eventually weaned of cardiopulmonary bypass with minimal vasopressor support.  Total cardiopulmonary bypass time was 60 minutes.  LV function was good.  The heart was adequately de-aired.      Once the patient remained stable off bypass, the venous cannula was  removed and protamine was given.  The aortic cannula was subsequently removed as well.  Temporary ventricular pacing wire was placed in the RV muscle.  28 Egyptian angled chest tube and two 32 Egyptian straight chest tubes were placed in the mediastinum.  These were all brought out percutaneously below the sternotomy incision and secured to the skin using 2-0 Ethibond.  Both pleural spaces were slightly opened.  The mediastinum was irrigated with antibiotic saline and hemostasis was achieved.  The sternum was reapproximated using multiple interrupted single and double wires.  The incision was closed in layers of running Vicryl suture.  The skin was closed using 3-0 Vicryl and was sealed using Dermabond.    There were no intraoperative complications and the patient tolerated the operation well.  No blood products were given intraoperatively.  All sponge counts, needle counts, and instrument counts were correct x 2 at the end of the operation.  EBL: 300 cc.  Specimen removed: ascending aorta.  The patient was brought to the ICU in hemodynamically stable condition and remained intubated.    Candis Torres MD

## 2024-05-31 NOTE — PROVIDER NOTIFICATION
Zurdo Guerrero contacted re: leaking martins bag and whether to remove and attempt trial of void or replace. Martins to be replaced and defer trial of voiding for now.

## 2024-05-31 NOTE — PROGRESS NOTES
Extubation Note    Successful completion of SBT (Yes or No):Yes   Extubation time:1359    Patient assessment:  Lung sounds:Clear  Stridor Present (Yes or No): No  Patient tolerance: Fair     Oxygen device:  Liter flow: 3L  SpO2:100%     Plan:Continue to monitor

## 2024-05-31 NOTE — ANESTHESIA PROCEDURE NOTES
Arterial Line Procedure Note    Pre-Procedure   Staff -        Anesthesiologist:  Nahun Pendleton MD       Performed By: Anesthesiologist       Location: pre-op       Pre-Anesthestic Checklist: patient identified, IV checked, site marked, risks and benefits discussed, informed consent, monitors and equipment checked, pre-op evaluation and at physician/surgeon's request  Timeout:       Correct Patient: Yes        Correct Procedure: Yes        Correct Site: Yes        Correct Position: Yes   Line Placement:   This line was placed Pre Induction starting at 5/31/2024 6:35 AM and ending at 5/31/2024 6:42 AM  Procedure   Procedure: arterial line       Laterality: right       Insertion Site: radial (Radial).  Sterile Prep        All elements of maximal sterile barrier technique followed       Patient Prep/Sterile Barriers: hand hygiene, sterile gloves, hat, mask, draped, sterile gown, draped       Skin prep: Chloraprep  Insertion/Injection        Technique: Seldinger Technique        Catheter Type/Size: 20 gauge, 1.75 in/4.5 cm quick cath (integral wire)  Narrative         Secured by: suture       Tegaderm dressing used.       Complications: None apparent,        Arterial waveform: Yes        IBP within 10% of NIBP: Yes

## 2024-05-31 NOTE — ANESTHESIA POSTPROCEDURE EVALUATION
Patient: Robson Gutierrez    Procedure: Procedure(s):  ASCENDING AORTA ANEURYSM REPAIR WITH HEMASHIELD PLATINUM GRAFT SIZE: 30MM, AND ON CARDIOPULMONARY PUMP OXYGENATOR  (INTRAOPERATIVE TRANSESOPHAGEAL ECHOCARDIOGRAM BY ANESTHESIOLOGIST)       Anesthesia Type:  General    Note:  Disposition: Admission   Postop Pain Control: Uneventful            Sign Out: Well controlled pain   PONV: No   Neuro/Psych: Uneventful            Sign Out: Acceptable/Baseline neuro status   Airway/Respiratory: Uneventful            Sign Out: AIRWAY IN SITU/Resp. Support   CV/Hemodynamics: Uneventful            Sign Out: Acceptable CV status   Other NRE: NONE   DID A NON-ROUTINE EVENT OCCUR? No    Event details/Postop Comments:  Patient transported from OR to the ICU, intubated and sedated, while monitored.  Sign-out was given to the MD, RN and RT.  All questions were answered.  Transport was without incident.    Nahun Pendleton MD             Last vitals:  Vitals:    05/31/24 1245 05/31/24 1250 05/31/24 1300   BP:      Pulse: 77 81 76   Resp: 18 18 18   Temp: 36.6  C (97.9  F) 36.6  C (97.9  F) 36.7  C (98.1  F)   SpO2: 96% 94% 100%       Electronically Signed By: Nahun Pendleton MD  May 31, 2024  1:10 PM

## 2024-06-01 ENCOUNTER — APPOINTMENT (OUTPATIENT)
Dept: GENERAL RADIOLOGY | Facility: CLINIC | Age: 59
DRG: 220 | End: 2024-06-01
Attending: PHYSICIAN ASSISTANT
Payer: COMMERCIAL

## 2024-06-01 ENCOUNTER — APPOINTMENT (OUTPATIENT)
Dept: PHYSICAL THERAPY | Facility: CLINIC | Age: 59
DRG: 220 | End: 2024-06-01
Attending: PHYSICIAN ASSISTANT
Payer: COMMERCIAL

## 2024-06-01 LAB
ALBUMIN SERPL BCG-MCNC: 3.1 G/DL (ref 3.5–5.2)
ALP SERPL-CCNC: 47 U/L (ref 40–150)
ALT SERPL W P-5'-P-CCNC: 12 U/L (ref 0–70)
ANION GAP SERPL CALCULATED.3IONS-SCNC: 7 MMOL/L (ref 7–15)
AST SERPL W P-5'-P-CCNC: 27 U/L (ref 0–45)
ATRIAL RATE - MUSE: 80 BPM
ATRIAL RATE - MUSE: 81 BPM
BILIRUB SERPL-MCNC: 0.6 MG/DL
BUN SERPL-MCNC: 15.3 MG/DL (ref 8–23)
CA-I BLD-MCNC: 4.6 MG/DL (ref 4.4–5.2)
CALCIUM SERPL-MCNC: 7.8 MG/DL (ref 8.8–10.2)
CHLORIDE SERPL-SCNC: 103 MMOL/L (ref 98–107)
CREAT SERPL-MCNC: 0.76 MG/DL (ref 0.67–1.17)
DEPRECATED HCO3 PLAS-SCNC: 26 MMOL/L (ref 22–29)
DIASTOLIC BLOOD PRESSURE - MUSE: NORMAL MMHG
DIASTOLIC BLOOD PRESSURE - MUSE: NORMAL MMHG
EGFRCR SERPLBLD CKD-EPI 2021: >90 ML/MIN/1.73M2
ERYTHROCYTE [DISTWIDTH] IN BLOOD BY AUTOMATED COUNT: 12.7 % (ref 10–15)
GLUCOSE BLDC GLUCOMTR-MCNC: 117 MG/DL (ref 70–99)
GLUCOSE BLDC GLUCOMTR-MCNC: 118 MG/DL (ref 70–99)
GLUCOSE BLDC GLUCOMTR-MCNC: 123 MG/DL (ref 70–99)
GLUCOSE BLDC GLUCOMTR-MCNC: 131 MG/DL (ref 70–99)
GLUCOSE BLDC GLUCOMTR-MCNC: 134 MG/DL (ref 70–99)
GLUCOSE BLDC GLUCOMTR-MCNC: 137 MG/DL (ref 70–99)
GLUCOSE BLDC GLUCOMTR-MCNC: 137 MG/DL (ref 70–99)
GLUCOSE BLDC GLUCOMTR-MCNC: 199 MG/DL (ref 70–99)
GLUCOSE SERPL-MCNC: 133 MG/DL (ref 70–99)
HCT VFR BLD AUTO: 28.5 % (ref 40–53)
HGB BLD-MCNC: 9.6 G/DL (ref 13.3–17.7)
INTERPRETATION ECG - MUSE: NORMAL
INTERPRETATION ECG - MUSE: NORMAL
MAGNESIUM SERPL-MCNC: 2.1 MG/DL (ref 1.7–2.3)
MCH RBC QN AUTO: 29.9 PG (ref 26.5–33)
MCHC RBC AUTO-ENTMCNC: 33.7 G/DL (ref 31.5–36.5)
MCV RBC AUTO: 89 FL (ref 78–100)
P AXIS - MUSE: 69 DEGREES
P AXIS - MUSE: 77 DEGREES
PHOSPHATE SERPL-MCNC: 2.7 MG/DL (ref 2.5–4.5)
PLATELET # BLD AUTO: 153 10E3/UL (ref 150–450)
POTASSIUM SERPL-SCNC: 3.7 MMOL/L (ref 3.4–5.3)
PR INTERVAL - MUSE: 160 MS
PR INTERVAL - MUSE: 176 MS
PROT SERPL-MCNC: 5.1 G/DL (ref 6.4–8.3)
QRS DURATION - MUSE: 114 MS
QRS DURATION - MUSE: 130 MS
QT - MUSE: 416 MS
QT - MUSE: 430 MS
QTC - MUSE: 483 MS
QTC - MUSE: 495 MS
R AXIS - MUSE: 27 DEGREES
R AXIS - MUSE: 65 DEGREES
RBC # BLD AUTO: 3.21 10E6/UL (ref 4.4–5.9)
SODIUM SERPL-SCNC: 136 MMOL/L (ref 135–145)
SYSTOLIC BLOOD PRESSURE - MUSE: NORMAL MMHG
SYSTOLIC BLOOD PRESSURE - MUSE: NORMAL MMHG
T AXIS - MUSE: 61 DEGREES
T AXIS - MUSE: 69 DEGREES
VENTRICULAR RATE- MUSE: 80 BPM
VENTRICULAR RATE- MUSE: 81 BPM
WBC # BLD AUTO: 10.4 10E3/UL (ref 4–11)

## 2024-06-01 PROCEDURE — 97530 THERAPEUTIC ACTIVITIES: CPT | Mod: GP

## 2024-06-01 PROCEDURE — 93005 ELECTROCARDIOGRAM TRACING: CPT

## 2024-06-01 PROCEDURE — 82330 ASSAY OF CALCIUM: CPT | Performed by: PHYSICIAN ASSISTANT

## 2024-06-01 PROCEDURE — 250N000011 HC RX IP 250 OP 636: Performed by: PHYSICIAN ASSISTANT

## 2024-06-01 PROCEDURE — 84100 ASSAY OF PHOSPHORUS: CPT | Performed by: PHYSICIAN ASSISTANT

## 2024-06-01 PROCEDURE — 250N000012 HC RX MED GY IP 250 OP 636 PS 637: Performed by: PHYSICIAN ASSISTANT

## 2024-06-01 PROCEDURE — P9045 ALBUMIN (HUMAN), 5%, 250 ML: HCPCS | Mod: JZ | Performed by: PHYSICIAN ASSISTANT

## 2024-06-01 PROCEDURE — 71045 X-RAY EXAM CHEST 1 VIEW: CPT

## 2024-06-01 PROCEDURE — 200N000001 HC R&B ICU

## 2024-06-01 PROCEDURE — 83735 ASSAY OF MAGNESIUM: CPT | Performed by: PHYSICIAN ASSISTANT

## 2024-06-01 PROCEDURE — 258N000003 HC RX IP 258 OP 636: Performed by: PHYSICIAN ASSISTANT

## 2024-06-01 PROCEDURE — 85027 COMPLETE CBC AUTOMATED: CPT | Performed by: PHYSICIAN ASSISTANT

## 2024-06-01 PROCEDURE — 80053 COMPREHEN METABOLIC PANEL: CPT | Performed by: PHYSICIAN ASSISTANT

## 2024-06-01 PROCEDURE — 250N000009 HC RX 250: Performed by: PHYSICIAN ASSISTANT

## 2024-06-01 PROCEDURE — 250N000013 HC RX MED GY IP 250 OP 250 PS 637: Performed by: PHYSICIAN ASSISTANT

## 2024-06-01 PROCEDURE — 99231 SBSQ HOSP IP/OBS SF/LOW 25: CPT | Mod: 24 | Performed by: SURGERY

## 2024-06-01 PROCEDURE — 97162 PT EVAL MOD COMPLEX 30 MIN: CPT | Mod: GP

## 2024-06-01 RX ORDER — NICOTINE POLACRILEX 4 MG
15-30 LOZENGE BUCCAL
Status: DISCONTINUED | OUTPATIENT
Start: 2024-06-01 | End: 2024-06-03 | Stop reason: HOSPADM

## 2024-06-01 RX ORDER — DEXTROSE MONOHYDRATE 25 G/50ML
25-50 INJECTION, SOLUTION INTRAVENOUS
Status: DISCONTINUED | OUTPATIENT
Start: 2024-06-01 | End: 2024-06-03 | Stop reason: HOSPADM

## 2024-06-01 RX ORDER — POTASSIUM CHLORIDE 1500 MG/1
20 TABLET, EXTENDED RELEASE ORAL ONCE
Status: COMPLETED | OUTPATIENT
Start: 2024-06-01 | End: 2024-06-01

## 2024-06-01 RX ADMIN — OXYCODONE HYDROCHLORIDE 5 MG: 5 TABLET ORAL at 21:49

## 2024-06-01 RX ADMIN — ONDANSETRON 4 MG: 2 INJECTION INTRAMUSCULAR; INTRAVENOUS at 00:51

## 2024-06-01 RX ADMIN — LATANOPROST 1 DROP: 50 SOLUTION/ DROPS OPHTHALMIC at 21:48

## 2024-06-01 RX ADMIN — METHOCARBAMOL 750 MG: 750 TABLET ORAL at 20:15

## 2024-06-01 RX ADMIN — ALBUMIN HUMAN 25 G: 0.05 INJECTION, SOLUTION INTRAVENOUS at 09:24

## 2024-06-01 RX ADMIN — ACETAMINOPHEN 975 MG: 325 TABLET, FILM COATED ORAL at 16:12

## 2024-06-01 RX ADMIN — ACETAMINOPHEN 975 MG: 325 TABLET, FILM COATED ORAL at 09:02

## 2024-06-01 RX ADMIN — HEPARIN SODIUM 5000 UNITS: 5000 INJECTION, SOLUTION INTRAVENOUS; SUBCUTANEOUS at 20:15

## 2024-06-01 RX ADMIN — GABAPENTIN 100 MG: 100 CAPSULE ORAL at 21:48

## 2024-06-01 RX ADMIN — DORZOLAMIDE HYDROCHLORIDE AND TIMOLOL MALEATE 1 DROP: 20; 5 SOLUTION/ DROPS OPHTHALMIC at 09:27

## 2024-06-01 RX ADMIN — SENNOSIDES AND DOCUSATE SODIUM 1 TABLET: 50; 8.6 TABLET ORAL at 20:15

## 2024-06-01 RX ADMIN — GABAPENTIN 100 MG: 100 CAPSULE ORAL at 16:12

## 2024-06-01 RX ADMIN — GABAPENTIN 100 MG: 100 CAPSULE ORAL at 09:02

## 2024-06-01 RX ADMIN — SENNOSIDES AND DOCUSATE SODIUM 1 TABLET: 50; 8.6 TABLET ORAL at 09:02

## 2024-06-01 RX ADMIN — PHENYLEPHRINE HYDROCHLORIDE 0.4 MCG/KG/MIN: 10 INJECTION INTRAVENOUS at 03:43

## 2024-06-01 RX ADMIN — POLYETHYLENE GLYCOL 3350 17 G: 17 POWDER, FOR SOLUTION ORAL at 09:02

## 2024-06-01 RX ADMIN — INSULIN ASPART 2 UNITS: 100 INJECTION, SOLUTION INTRAVENOUS; SUBCUTANEOUS at 11:29

## 2024-06-01 RX ADMIN — ACETAMINOPHEN 975 MG: 325 TABLET, FILM COATED ORAL at 00:08

## 2024-06-01 RX ADMIN — HEPARIN SODIUM 5000 UNITS: 5000 INJECTION, SOLUTION INTRAVENOUS; SUBCUTANEOUS at 11:29

## 2024-06-01 RX ADMIN — DORZOLAMIDE HYDROCHLORIDE AND TIMOLOL MALEATE 1 DROP: 20; 5 SOLUTION/ DROPS OPHTHALMIC at 21:48

## 2024-06-01 RX ADMIN — ASPIRIN 81 MG CHEWABLE TABLET 81 MG: 81 TABLET CHEWABLE at 09:01

## 2024-06-01 RX ADMIN — PANTOPRAZOLE SODIUM 40 MG: 40 TABLET, DELAYED RELEASE ORAL at 11:29

## 2024-06-01 RX ADMIN — POTASSIUM CHLORIDE 20 MEQ: 1500 TABLET, EXTENDED RELEASE ORAL at 05:44

## 2024-06-01 RX ADMIN — CEFAZOLIN 1 G: 1 INJECTION, POWDER, FOR SOLUTION INTRAMUSCULAR; INTRAVENOUS at 09:01

## 2024-06-01 RX ADMIN — SODIUM PHOSPHATE, MONOBASIC, MONOHYDRATE AND SODIUM PHOSPHATE, DIBASIC, ANHYDROUS 9 MMOL: 142; 276 INJECTION, SOLUTION INTRAVENOUS at 05:44

## 2024-06-01 RX ADMIN — CEFAZOLIN 1 G: 1 INJECTION, POWDER, FOR SOLUTION INTRAMUSCULAR; INTRAVENOUS at 00:13

## 2024-06-01 ASSESSMENT — ACTIVITIES OF DAILY LIVING (ADL)
ADLS_ACUITY_SCORE: 32
ADLS_ACUITY_SCORE: 25
ADLS_ACUITY_SCORE: 34
ADLS_ACUITY_SCORE: 32
ADLS_ACUITY_SCORE: 31
ADLS_ACUITY_SCORE: 32
ADLS_ACUITY_SCORE: 32
ADLS_ACUITY_SCORE: 34
ADLS_ACUITY_SCORE: 34
ADLS_ACUITY_SCORE: 25
ADLS_ACUITY_SCORE: 32
ADLS_ACUITY_SCORE: 34
ADLS_ACUITY_SCORE: 31
ADLS_ACUITY_SCORE: 25
ADLS_ACUITY_SCORE: 32
ADLS_ACUITY_SCORE: 25
ADLS_ACUITY_SCORE: 31
ADLS_ACUITY_SCORE: 34
ADLS_ACUITY_SCORE: 32
ADLS_ACUITY_SCORE: 34
ADLS_ACUITY_SCORE: 31

## 2024-06-01 NOTE — PROGRESS NOTES
[facility]  Cardiovascular and Thoracic Surgery Daily Note      Assessment and Plan  POD #1 s/p repair of ascending aortic aneurysm on 5/31/2024 with Dr. Torres    - CVS: Pre-op TTE with EF 60-65%  No regional WMA  Trace mitral regurg  Trace tricuspid regurg  Carotid US (4/16/24) - less than 50% stenosis of bilateral ICA  BP 90-110s/50s, HR 80s, continues on epi 0.01 mcg/kg/min and dana 0.06 mcg/kg/min, aspirin 81 mg, BB when able, no hypercholesterolemia, will give some albumin, hold on diuresis, wean gtts as able, CT output serous, will leave in place, TPWs capped     - Resp: Extubated POD #0 at 1359. Working with IS, pulmonary toilet.    - Neuro: Neuro intact, pain controlled on current regimen    - Renal: No history of significant renal disease. Trend BMP. Diuretic as above.  Recent Labs   Lab 06/01/24 0446 05/31/24  1138 05/31/24  0942   CR 0.76 0.79 0.75       - GI: No BM, denies any flatus, continue bowel regimen    - : Continue martins    - Endo: Postop stress hyperglycemia, discontinue insulin infusion, start sliding scale insulin    Hemoglobin A1C   Date Value Ref Range Status   04/16/2024 5.5 <5.7 % Final     Comment:     Normal <5.7%   Prediabetes 5.7-6.4%    Diabetes 6.5% or higher     Note: Adopted from ADA consensus guidelines.        - FEN: Replace electrolytes as needed. Advance to regular diet as tolerated.     - ID: Afebrile. Finishing periop abx prophylaxis. Trend CBC and fever curve.   Recent Labs   Lab 06/01/24  0446 05/31/24  1138 05/31/24  0942   WBC 10.4 9.1 7.3       - Heme: Acute blood loss anemia adue to surgery. Trend CBC, transfuse PRN.   Recent Labs   Lab 06/01/24  0446 05/31/24  1138 05/31/24  0950 05/31/24  0942   HGB 9.6* 11.4* 10.6* 10.4*    133*  --  135*       - Proph: SCD, subcutaneous heparin, PPI    - Other:  Clinically Significant Risk Factors          # Hypocalcemia: Lowest iCa = 3.8 mg/dL in last 2 days, will monitor and replace as appropriate  #  Hypercalcemia: Highest iCa = 5.3 mg/dL in last 2 days, will monitor as appropriate    # Hypoalbuminemia: Lowest albumin = 3.1 g/dL at 6/1/2024  4:46 AM, will monitor as appropriate  # Coagulation Defect: INR = 1.46 (Ref range: 0.85 - 1.15) and/or PTT = 73 Seconds (Ref range: 22 - 38 Seconds), will monitor for bleeding    # Hypertension: Noted on problem list                   - Dispo: Encouraged IS/TCDB/amb. Sternal precautions. Will give some albumin. Wean epi and dana as tolerated. Discontinue insulin gtt and start sliding scale insulin. Will leave chest tubes and martins in place. Continue in the ICU. Continue to monitor.     Interval History  States that pain is being controlled. Denies any hallucinations. Breathing is ok. Working with IS. Feeling hungry this am. Denies any flatus. Denies any popping/clicking in his breast bone. Some nausea/dizziness when getting out of bed. Was able to get some sleep.      Medications  Current Facility-Administered Medications   Medication Dose Route Frequency Provider Last Rate Last Admin    acetaminophen (TYLENOL) tablet 975 mg  975 mg Oral Q8H Zurdo Guerrero PA-C   975 mg at 06/01/24 0008    aspirin (ASA) chewable tablet 81 mg  81 mg Oral or NG Tube Daily Zurdo Guerrero PA-C        ceFAZolin (ANCEF) 1 g vial to attach to  ml bag for ADULT or 50 ml bag for PEDS  1 g Intravenous Q8H Zurdo Guerrero PA-C   1 g at 06/01/24 0013    dorzolamide-timolol (COSOPT) ophthalmic solution 1 drop  1 drop Both Eyes BID uZrdo Guerrero PA-C   1 drop at 05/31/24 2200    gabapentin (NEURONTIN) capsule 100 mg  100 mg Oral TID Zurdo Guerrero PA-C   100 mg at 05/31/24 2155    heparin ANTICOAGULANT injection 5,000 Units  5,000 Units Subcutaneous Q8H Zurdo Guerrero PA-C        latanoprost (XALATAN) 0.005 % ophthalmic solution 1 drop  1 drop Both Eyes At Bedtime Zurdo Guerrero PA-C   1 drop at 05/31/24 2203    pantoprazole (PROTONIX) 2 mg/mL suspension 40 mg  40 mg Oral or NG Tube  Daily Zurdo Guerrero PA-C        Or    pantoprazole (PROTONIX) EC tablet 40 mg  40 mg Oral Daily Zurdo Guerrero PA-C   40 mg at 05/31/24 1807    polyethylene glycol (MIRALAX) Packet 17 g  17 g Oral Daily Zurdo Guerrero PA-C        senna-docusate (SENOKOT-S/PERICOLACE) 8.6-50 MG per tablet 1 tablet  1 tablet Oral BID Zurdo Guerrero PA-C   1 tablet at 05/31/24 2155    sodium chloride (PF) 0.9% PF flush 3 mL  3 mL Intracatheter Q8H Zurdo Guerrero PA-C   3 mL at 05/31/24 1957    sodium phosphate 9 mmol in 250 mL NS intermittent infusion  9 mmol Intravenous Once Zurdo Guerrero PA-C   9 mmol at 06/01/24 0544     Current Facility-Administered Medications   Medication Dose Route Frequency Provider Last Rate Last Admin    [START ON 6/3/2024] acetaminophen (TYLENOL) tablet 650 mg  650 mg Oral Q4H PRN Zurdo Guerrero PA-C        [START ON 6/3/2024] bisacodyl (DULCOLAX) suppository 10 mg  10 mg Rectal Daily PRN Zurdo Guerrero PA-C        calcium carbonate (TUMS) chewable tablet 500 mg  500 mg Oral 4x Daily PRN Zurdo Guerrero PA-C        calcium gluconate 1 g in 50 mL in sodium chloride intermittent infusion  1 g Intravenous Q6H PRN Zurdo Guerrero PA-C        calcium gluconate 2 g in  mL intermittent infusion  2 g Intravenous Q6H PRN Zurdo Guerrero PA-C        calcium gluconate 3 g in sodium chloride 0.9 % 100 mL intermittent infusion  3 g Intravenous Q6H PRN Zurdo Guerrero PA-C        glucose gel 15-30 g  15-30 g Oral Q15 Min PRN Zurdo Guerrero PA-C        Or    dextrose 50 % injection 25-50 mL  25-50 mL Intravenous Q15 Min PRN Zurdo Guerrero PA-C        Or    glucagon injection 1 mg  1 mg Subcutaneous Q15 Min PRN Zurdo Guerrero PA-C        EPINEPHrine (ADRENALIN) 5 mg in  mL infusion  0.01-0.1 mcg/kg/min Intravenous Continuous PRN Zurdo Guerrero PA-C 2.3 mL/hr at 06/01/24 0728 0.01 mcg/kg/min at 06/01/24 0728    furosemide (LASIX) injection 20 mg  20 mg Intravenous Once PRN Cesar,  Zurdo CARIAS PA-C        hydrALAZINE (APRESOLINE) injection 10 mg  10 mg Intravenous Q30 Min PRN Zurdo Guerrero PA-C        HYDROmorphone (DILAUDID) injection 0.2 mg  0.2 mg Intravenous Q2H PRN Zurdo Guerrero PA-C        Or    HYDROmorphone (DILAUDID) injection 0.4 mg  0.4 mg Intravenous Q2H PRN Zurdo Guerrero PA-C        lactated ringers BOLUS 250 mL  250 mL Intravenous Q10 Min PRN Zurdo Guerrero PA-C   250 mL at 05/31/24 1843    lidocaine (LMX4) cream   Topical Q1H PRN Zurdo Guerrero PA-C        lidocaine 1 % 0.1-1 mL  0.1-1 mL Other Q1H PRN Zurdo Guerrero PA-C        [START ON 6/2/2024] magnesium hydroxide (MILK OF MAGNESIA) suspension 30 mL  30 mL Oral Daily PRN Zurdo Guerrero PA-C        propofol (DIPRIVAN) bolus from bag or syringe pump  10 mg Intravenous Q15 Min PRN Olaf Arellano MD        And    Medication Instruction   Does not apply Continuous PRN Olaf Arellano MD        methocarbamol (ROBAXIN) tablet 750 mg  750 mg Oral Q6H PRN Zurdo Guerrero PA-C   750 mg at 05/31/24 1712    naloxone (NARCAN) injection 0.2 mg  0.2 mg Intravenous Q2 Min PRN Candis Torres MD        Or    naloxone (NARCAN) injection 0.4 mg  0.4 mg Intravenous Q2 Min PRN Candis Torres MD        Or    naloxone (NARCAN) injection 0.2 mg  0.2 mg Intramuscular Q2 Min PRN Candis Torres MD        Or    naloxone (NARCAN) injection 0.4 mg  0.4 mg Intramuscular Q2 Min PRN Candis Torres MD        norepinephrine (LEVOPHED) 16 mg in  mL infusion MAX CONC CENTRAL LINE  0.01-0.4 mcg/kg/min Intravenous Continuous PRN Zurdo Guerrero PA-C        ondansetron (ZOFRAN ODT) ODT tab 4 mg  4 mg Oral Q6H PRN Zurdo Guerrero PA-C        Or    ondansetron (ZOFRAN) injection 4 mg  4 mg Intravenous Q6H PRN Zurdo Guerrero PA-C   4 mg at 06/01/24 0051    oxyCODONE (ROXICODONE) tablet 5 mg  5 mg Oral Q4H PRN Zurdo Guerrero PA-C   5 mg at 05/31/24 2220    Or    oxyCODONE (ROXICODONE) tablet 10 mg   10 mg Oral Q4H PRN Zurdo Guerrero PA-C        prochlorperazine (COMPAZINE) injection 10 mg  10 mg Intravenous Q6H PRN Zurdo Guerrero PA-C        Or    prochlorperazine (COMPAZINE) tablet 10 mg  10 mg Oral Q6H PRN Zurdo Guerrero PA-C        Reason beta blocker order not selected   Does not apply DOES NOT GO TO Zurdo Chawla PA-C        sodium chloride (PF) 0.9% PF flush 3 mL  3 mL Intracatheter q1 min prn Zurdo Guerrero PA-C        vasopressin 0.2 units/mL in NS (PITRESSIN) standard conc infusion  0.5-4 Units/hr Intravenous Continuous PRN Zurdo Guerrero PA-C             Physical Exam  Vitals were reviewed  Blood pressure 94/64, pulse 80, temperature 98.4  F (36.9  C), resp. rate 29, height 1.829 m (6'), weight 80.4 kg (177 lb 4 oz), SpO2 99%.  Gen: lying in bed, comfortable, -O2    Lungs: diminished bases, -750 ml    Cardiovascular: RRR, telemetry SR 80s, -edema LE, +dorsalis pedis pulses 2+ bilaterally    Abdomen: BS+, NT/ND, soft    Derm: sternal incision D/I    CT: serous output, no air leak    CXR (6/1/24) - extubated, chest tubes in place, good aeration    Weight:   Vitals:    05/31/24 0521 05/31/24 1200 06/01/24 0400   Weight: 77.6 kg (171 lb) 78.9 kg (173 lb 15.1 oz) 80.4 kg (177 lb 4 oz)         Data  Recent Labs   Lab 06/01/24  0613 06/01/24  0446 06/01/24  0300 05/31/24  1257 05/31/24  1138 05/31/24  0950 05/31/24  0942 05/31/24  0751 05/31/24  0549   WBC  --  10.4  --   --  9.1  --  7.3  --  5.2   HGB  --  9.6*  --   --  11.4* 10.6* 10.4*   < > 13.6   MCV  --  89  --   --  88  --  88  --  88   PLT  --  153  --   --  133*  --  135*  --  194   INR  --   --   --   --  1.46*  --  1.42*  --  1.08   NA  --  136  --   --  142 141 139   < > 140   POTASSIUM  --  3.7  --   --  3.7 4.0 4.2   < > 3.5   CHLORIDE  --  103  --   --  111*  --  108*  --  103   CO2  --  26  --   --  23  --  24  --  27   BUN  --  15.3  --   --  13.6  --  14.5  --  18.1   CR  --  0.76  --   --  0.79  --  0.75  --  0.98    ANIONGAP  --  7  --   --  8  --  7  --  10   HALEY  --  7.8*  --   --  8.1*  --  9.2  --  9.1   * 133* 131*   < > 154*  163* 117* 120*   < > 103*   ALBUMIN  --  3.1*  --   --  3.2*  --   --   --  3.8   PROTTOTAL  --  5.1*  --   --  5.0*  --   --   --  7.2   BILITOTAL  --  0.6  --   --  0.9  --   --   --  0.4   ALKPHOS  --  47  --   --  52  --   --   --  93   ALT  --  12  --   --  11  --   --   --  16   AST  --  27  --   --  28  --   --   --  19    < > = values in this interval not displayed.       Imaging:  Recent Results (from the past 24 hour(s))   XR Chest Port 1 View    Narrative    CHEST ONE VIEW May 31, 2024 12:21 PM     HISTORY: Postoperative CVTS Surgery.    COMPARISON: April 16, 2024.      Impression    IMPRESSION: Endotracheal tube tip 3.2 cm from the jacky. Right IJ  line noted with tip in the low SVC. Chest tubes in place, no gross  pneumothorax evident in supine position. No definite infiltrates.    EKATERINA WOODS MD         SYSTEM ID:  [id number]       Patient seen and discussed with Dr Daisy Guerrero PA-C  Cardiothoracic Surgery

## 2024-06-01 NOTE — PLAN OF CARE
"Neuro: Intact.    CV: SR/ST 90-110s    Resp: RA. Diminished bases.    GI: BS normoactive. Good appetite.     : adequate UOP. Morrell in place.    Skin: intact    Activity: Ax1-2 to chair    Other: Family updated at bedside.      Problem: Adult Inpatient Plan of Care  Goal: Plan of Care Review  Description: The Plan of Care Review/Shift note should be completed every shift.  The Outcome Evaluation is a brief statement about your assessment that the patient is improving, declining, or no change.  This information will be displayed automatically on your shift  note.  Outcome: Progressing  Goal: Patient-Specific Goal (Individualized)  Description: You can add care plan individualizations to a care plan. Examples of Individualization might be:  \"Parent requests to be called daily at 9am for status\", \"I have a hard time hearing out of my right ear\", or \"Do not touch me to wake me up as it startles  me\".  Outcome: Progressing  Goal: Absence of Hospital-Acquired Illness or Injury  Outcome: Progressing  Intervention: Identify and Manage Fall Risk  Recent Flowsheet Documentation  Taken 6/1/2024 1200 by Balbir Mckeon, RN  Safety Promotion/Fall Prevention:   activity supervised   check orthostatic blood pressure   clutter free environment maintained   nonskid shoes/slippers when out of bed   patient and family education   supervised activity   treat reversible contributory factors   treat underlying cause   room door open   room near nurse's station  Taken 6/1/2024 0800 by Balbir Mckeon, RN  Safety Promotion/Fall Prevention:   activity supervised   check orthostatic blood pressure   clutter free environment maintained   nonskid shoes/slippers when out of bed   patient and family education   supervised activity   treat reversible contributory factors   treat underlying cause   increased rounding and observation   room door open   room near nurse's station  Intervention: Prevent Skin Injury  Recent Flowsheet " Documentation  Taken 6/1/2024 1400 by Balbir Mckeon RN  Body Position:   position maintained   weight shifting  Taken 6/1/2024 1220 by Balbir Mckeon RN  Body Position:   position changed independently   heels elevated   weight shifting  Taken 6/1/2024 1200 by Balbir Mckeon RN  Skin Protection:   adhesive use limited   incontinence pads utilized   silicone foam dressing in place   skin to device areas padded   transparent dressing maintained  Device Skin Pressure Protection:   absorbent pad utilized/changed   adhesive use limited   skin-to-device areas padded   tubing/devices free from skin contact  Taken 6/1/2024 1000 by Balbir Mckeon RN  Body Position:   weight shifting   position changed independently   heels elevated  Taken 6/1/2024 0800 by Balbir Mckeon RN  Body Position:   weight shifting   position changed independently   heels elevated  Skin Protection:   adhesive use limited   incontinence pads utilized   pulse oximeter probe site changed   silicone foam dressing in place   skin to device areas padded   transparent dressing maintained  Device Skin Pressure Protection:   absorbent pad utilized/changed   adhesive use limited   skin-to-device areas padded   tubing/devices free from skin contact  Intervention: Prevent and Manage VTE (Venous Thromboembolism) Risk  Recent Flowsheet Documentation  Taken 6/1/2024 1200 by Balbir Mckeon RN  VTE Prevention/Management: SCDs (sequential compression devices) on  Taken 6/1/2024 0800 by Balbir Mckeon RN  VTE Prevention/Management: SCDs (sequential compression devices) on  Intervention: Prevent Infection  Recent Flowsheet Documentation  Taken 6/1/2024 1200 by Balbir Mckeon RN  Infection Prevention:   environmental surveillance performed   equipment surfaces disinfected   hand hygiene promoted   personal protective equipment utilized   rest/sleep promoted   single patient room provided  Taken 6/1/2024 0800 by Balbir Mckeon RN  Infection  Prevention:   environmental surveillance performed   equipment surfaces disinfected   hand hygiene promoted   personal protective equipment utilized   rest/sleep promoted   single patient room provided  Goal: Optimal Comfort and Wellbeing  Outcome: Progressing  Intervention: Provide Person-Centered Care  Recent Flowsheet Documentation  Taken 6/1/2024 1200 by Balbir Mckeon RN  Trust Relationship/Rapport:   care explained   choices provided   questions answered   questions encouraged  Taken 6/1/2024 1000 by Balbir Mckeon RN  Trust Relationship/Rapport:   care explained   choices provided   questions answered   questions encouraged  Taken 6/1/2024 0800 by Balbir Mckeon RN  Trust Relationship/Rapport:   questions answered   care explained   choices provided   questions encouraged  Goal: Readiness for Transition of Care  Outcome: Progressing     Problem: Cardiovascular Surgery  Goal: Improved Activity Tolerance  Outcome: Progressing  Intervention: Optimize Tolerance for Activity  Recent Flowsheet Documentation  Taken 6/1/2024 1200 by Balbir Mckeon RN  Environmental Support:   calm environment promoted   distractions minimized   rest periods encouraged   personal routine supported  Taken 6/1/2024 0800 by Balbir Mckeon RN  Environmental Support:   calm environment promoted   distractions minimized   rest periods encouraged   personal routine supported  Goal: Absence of Bleeding  Outcome: Progressing  Intervention: Monitor and Manage Bleeding  Recent Flowsheet Documentation  Taken 6/1/2024 1200 by Balbir Mckeon RN  Bleeding Management: dressing monitored  Taken 6/1/2024 0800 by Balbir Mckeon RN  Bleeding Management: dressing monitored  Goal: Effective Bowel Elimination  Outcome: Progressing  Intervention: Enhance Bowel Motility and Elimination  Recent Flowsheet Documentation  Taken 6/1/2024 1200 by Balbir Mckeon RN  Bowel Motility Enhancement:   fluid intake encouraged   oral intake  encouraged  Bowel Elimination Management:   hygiene measures promoted   sitting position facilitated  Taken 6/1/2024 0800 by Balbir Mckeon RN  Bowel Motility Enhancement:   fluid intake encouraged   oral intake encouraged  Bowel Elimination Management:   hygiene measures promoted   sitting position facilitated  Goal: Effective Cardiac Function  Outcome: Progressing  Intervention: Optimize Cardiac Output and Blood Flow  Recent Flowsheet Documentation  Taken 6/1/2024 1200 by Balbir Mckeon RN  Dysrhythmia Management: (capped) pacing wires maintained  Taken 6/1/2024 0800 by Balbir Mckeon RN  Dysrhythmia Management: (capped) pacing wires maintained  Goal: Optimal Cerebral Tissue Perfusion  Outcome: Progressing  Intervention: Protect and Optimize Cerebral Perfusion  Recent Flowsheet Documentation  Taken 6/1/2024 1400 by Balbir Mckeon RN  Head of Bed (HOB) Positioning: HOB at 20 degrees  Taken 6/1/2024 1220 by Balbir Mckeon RN  Head of Bed (HOB) Positioning: HOB at 30 degrees  Taken 6/1/2024 1200 by Balbir Mckeon RN  Sensory Stimulation Regulation:   care clustered   quiet environment promoted   visual stimulation minimized   lighting decreased  Cerebral Perfusion Promotion:   blood pressure monitored   normothermia promoted  Glycemic Management:   blood glucose monitored   oral hydration promoted   supplemental insulin given  Taken 6/1/2024 1000 by Balbir cMkeon RN  Glycemic Management: blood glucose monitored  Head of Bed (HOB) Positioning: HOB at 30-45 degrees  Taken 6/1/2024 0800 by Balbir Mckeon RN  Sensory Stimulation Regulation:   care clustered   quiet environment promoted   visual stimulation minimized  Cerebral Perfusion Promotion:   blood pressure monitored   normothermia promoted  Glycemic Management:   blood glucose monitored   insulin infusion adjusted  Head of Bed (HOB) Positioning: HOB at 30 degrees  Goal: Fluid and Electrolyte Balance  Outcome: Progressing  Intervention:  Monitor and Manage Fluid and Electrolyte Balance  Recent Flowsheet Documentation  Taken 6/1/2024 1200 by Balbir Mckeon RN  Fluid/Electrolyte Management: fluids provided  Taken 6/1/2024 0800 by Balbir Mckeon RN  Fluid/Electrolyte Management:   electrolyte supplement initiated   fluids provided  Goal: Blood Glucose Level Within Targeted Range  Outcome: Progressing  Intervention: Optimize Glycemic Control  Recent Flowsheet Documentation  Taken 6/1/2024 1200 by Balbir Mckeon RN  Glycemic Management:   blood glucose monitored   oral hydration promoted   supplemental insulin given  Taken 6/1/2024 1000 by Balbir Mckeon RN  Glycemic Management: blood glucose monitored  Taken 6/1/2024 0800 by Balbir Mckeon RN  Glycemic Management:   blood glucose monitored   insulin infusion adjusted  Goal: Absence of Infection Signs and Symptoms  Outcome: Progressing  Intervention: Prevent or Manage Infection  Recent Flowsheet Documentation  Taken 6/1/2024 1200 by Balbir Mckeon RN  Infection Prevention:   environmental surveillance performed   equipment surfaces disinfected   hand hygiene promoted   personal protective equipment utilized   rest/sleep promoted   single patient room provided  Taken 6/1/2024 0800 by Balbir Mckeon RN  Infection Prevention:   environmental surveillance performed   equipment surfaces disinfected   hand hygiene promoted   personal protective equipment utilized   rest/sleep promoted   single patient room provided  Goal: Acceptable Pain Control  Outcome: Progressing  Goal: Nausea and Vomiting Relief  Outcome: Progressing  Goal: Effective Urinary Elimination  Outcome: Progressing  Intervention: Monitor and Manage Urinary Retention  Recent Flowsheet Documentation  Taken 6/1/2024 1200 by Balbir Mckeon RN  Urinary Elimination Promotion: catheter patency maintained  Taken 6/1/2024 0800 by Balbir Mckeon RN  Urinary Elimination Promotion: catheter patency maintained  Goal: Effective  Oxygenation and Ventilation  Outcome: Progressing  Intervention: Promote Airway Secretion Clearance  Recent Flowsheet Documentation  Taken 6/1/2024 1200 by Balbir Mckeon RN  Cough And Deep Breathing: done with encouragement  Airway/Ventilation Management:   pulmonary hygiene promoted   calming measures promoted  Taken 6/1/2024 1020 by Balbir Mckeon RN  Administration (IS):   self-administered   proper technique demonstrated   instruction provided, follow-up  Level Incentive Spirometer (mL): 1200  Number of Repetitions (IS): 10  Patient Tolerance (IS): good  Taken 6/1/2024 0800 by Balbir Mckeon RN  Cough And Deep Breathing: done with encouragement  Airway/Ventilation Management:   pulmonary hygiene promoted   calming measures promoted  Intervention: Optimize Oxygenation and Ventilation  Recent Flowsheet Documentation  Taken 6/1/2024 1200 by Balbir Mckeon RN  Chest Tube Safety:   all connections secured   suction checked  Taken 6/1/2024 0800 by Balbir Mckeon RN  Chest Tube Safety:   all connections secured   suction checked     Problem: Comorbidity Management  Goal: Blood Pressure in Desired Range  Outcome: Progressing  Intervention: Maintain Blood Pressure Management  Recent Flowsheet Documentation  Taken 6/1/2024 1200 by Balbir Mckeon RN  Medication Review/Management:   medications reviewed   high-risk medications identified   infusion titrated  Taken 6/1/2024 0800 by Balbir Mckeon RN  Medication Review/Management:   medications reviewed   high-risk medications identified   infusion titrated   provider consulted

## 2024-06-01 NOTE — PROGRESS NOTES
ICU Staff:    I examined the patient at the bedside in the [facility] ICU. I managed the patient at the bedside in the  [facility] ICU   for their critical illness.  I reviewed the patient's medical records, progress notes, and imaging studies.  I discussed the medical and surgical history of the patient, the physical findings, the labs and imaging studies, and the ICU care plan for the patient with the members of the ICU care team. The patient is a 58 year old male with a history of ascending aortic aneurysm now s/p ascending aortic aneurysm repair. The patient is up to chair at the bedside.  The patien family members are with the patient.      PAST MEDICAL HISTORY:  Past Medical History:   Diagnosis Date    Aortic aneurysm (H24)     Ascending aortic aneurysm (H24) 03/01/2018    4.7 cm on echo    Bicuspid aortic valve     Congenital insufficiency of aortic valve     Glaucoma     HTN (hypertension)     Pectus excavatum       PAST SURGICAL HISTORY:  Past Surgical History:   Procedure Laterality Date    CV CORONARY ANGIOGRAM N/A 4/16/2024    Procedure: Coronary Angiogram;  Surgeon: Zach Suárez MD;  Location: [facility] CARDIAC CATH LAB    CV LEFT HEART CATH N/A 4/16/2024    Procedure: Left Heart Catheterization;  Surgeon: Zach Suárez MD;  Location:  HEART CARDIAC CATH LAB     MEDICATIONS:  Current Facility-Administered Medications   Medication Dose Route Frequency Provider Last Rate Last Admin    [START ON 6/3/2024] acetaminophen (TYLENOL) tablet 650 mg  650 mg Oral Q4H PRN Zurdo Guerrero PA-C        acetaminophen (TYLENOL) tablet 975 mg  975 mg Oral Q8H Zurdo Guerrero PA-C   975 mg at 06/01/24 0902    aspirin (ASA) chewable tablet 81 mg  81 mg Oral or NG Tube Daily Zurdo Guerrero PA-C   81 mg at 06/01/24 0901    [START ON 6/3/2024] bisacodyl (DULCOLAX) suppository 10 mg  10 mg Rectal Daily PRN Zurdo Guerrero PA-C        calcium carbonate (TUMS)  chewable tablet 500 mg  500 mg Oral 4x Daily PRN Zurdo Guerrero PA-C        calcium gluconate 1 g in 50 mL in sodium chloride intermittent infusion  1 g Intravenous Q6H PRN Zurdo Guerrero PA-C        calcium gluconate 2 g in  mL intermittent infusion  2 g Intravenous Q6H PRN Zurdo Guerrero PA-C        calcium gluconate 3 g in sodium chloride 0.9 % 100 mL intermittent infusion  3 g Intravenous Q6H PRN Zurdo Guerrero PA-C        dexmedeTOMIDine (PRECEDEX) 4 mcg/mL in sodium chloride 0.9 % 100 mL infusion  0.2-0.7 mcg/kg/hr Intravenous Continuous Zurdo Guerrero PA-C   Stopped at 05/31/24 1400    glucose gel 15-30 g  15-30 g Oral Q15 Min PRN Zurdo Guerrero PA-C        Or    dextrose 50 % injection 25-50 mL  25-50 mL Intravenous Q15 Min PRN Zurdo Guerrero PA-C        Or    glucagon injection 1 mg  1 mg Subcutaneous Q15 Min PRN Zurdo Guerrero PA-C        dorzolamide-timolol (COSOPT) ophthalmic solution 1 drop  1 drop Both Eyes BID Zurdo Guerrero PA-C   1 drop at 06/01/24 0927    EPINEPHrine (ADRENALIN) 5 mg in  mL infusion  0.01-0.1 mcg/kg/min Intravenous Continuous PRN Zurdo Guerrero PA-C   Stopped at 06/01/24 0910    furosemide (LASIX) injection 20 mg  20 mg Intravenous Once PRN Zurdo Guerrero PA-C        gabapentin (NEURONTIN) capsule 100 mg  100 mg Oral TID Zurdo Guerrero PA-C   100 mg at 06/01/24 0902    heparin ANTICOAGULANT injection 5,000 Units  5,000 Units Subcutaneous Q8H Zurdo Guerrero PA-C   5,000 Units at 06/01/24 1129    hydrALAZINE (APRESOLINE) injection 10 mg  10 mg Intravenous Q30 Min PRN Zurdo Guerrero PA-C        HYDROmorphone (DILAUDID) injection 0.2 mg  0.2 mg Intravenous Q2H PRN Zurdo Guerrero PA-C        Or    HYDROmorphone (DILAUDID) injection 0.4 mg  0.4 mg Intravenous Q2H PRN Zurdo Guerrero PA-C        insulin aspart (NovoLOG) injection (RAPID ACTING)  1-7 Units Subcutaneous TID AC Zurdo Guerrero PA-C   2 Units at 06/01/24 1129    insulin aspart  (NovoLOG) injection (RAPID ACTING)  1-5 Units Subcutaneous At Bedtime Zurdo Guerrero PA-C        lactated ringers BOLUS 250 mL  250 mL Intravenous Q10 Min PRN Zurdo Guerrero PA-C   250 mL at 05/31/24 1843    lactated ringers infusion   Intravenous Continuous Zurdo Guerrero PA-C 30 mL/hr at 06/01/24 0813 Rate Verify at 06/01/24 0813    latanoprost (XALATAN) 0.005 % ophthalmic solution 1 drop  1 drop Both Eyes At Bedtime Zurdo Guerrero PA-C   1 drop at 05/31/24 2203    lidocaine (LMX4) cream   Topical Q1H PRN Zurdo Guerrero PA-C        lidocaine 1 % 0.1-1 mL  0.1-1 mL Other Q1H PRN Zurdo Guerrero PA-C        [START ON 6/2/2024] magnesium hydroxide (MILK OF MAGNESIA) suspension 30 mL  30 mL Oral Daily PRN Zurdo Guerrero PA-C        methocarbamol (ROBAXIN) tablet 750 mg  750 mg Oral Q6H PRN Zurdo Guerrero PA-C   750 mg at 05/31/24 1712    naloxone (NARCAN) injection 0.2 mg  0.2 mg Intravenous Q2 Min PRN Candis Torres MD        Or    naloxone (NARCAN) injection 0.4 mg  0.4 mg Intravenous Q2 Min PRN Candis Torres MD        Or    naloxone (NARCAN) injection 0.2 mg  0.2 mg Intramuscular Q2 Min PRN Candis Torres MD        Or    naloxone (NARCAN) injection 0.4 mg  0.4 mg Intramuscular Q2 Min PRN Candis Torres MD        norepinephrine (LEVOPHED) 16 mg in  mL infusion MAX CONC CENTRAL LINE  0.01-0.4 mcg/kg/min Intravenous Continuous PRN Zurdo Guerrero PA-C        ondansetron (ZOFRAN ODT) ODT tab 4 mg  4 mg Oral Q6H PRN Zurdo Guerrero PA-C        Or    ondansetron (ZOFRAN) injection 4 mg  4 mg Intravenous Q6H PRN Zurdo Guerrero PA-C   4 mg at 06/01/24 0051    oxyCODONE (ROXICODONE) tablet 5 mg  5 mg Oral Q4H PRN Zurdo Guerrero PA-C   5 mg at 05/31/24 2220    Or    oxyCODONE (ROXICODONE) tablet 10 mg  10 mg Oral Q4H PRN Zurdo Guerrero PA-C        pantoprazole (PROTONIX) 2 mg/mL suspension 40 mg  40 mg Oral or NG Tube Daily Zurdo Guerrero PA-C        Or     pantoprazole (PROTONIX) EC tablet 40 mg  40 mg Oral Daily Zurdo Guerrero PA-C   40 mg at 06/01/24 1129    phenylephrine (LYNN-SYNEPHRINE) 50 mg in NaCl 0.9 % 250 mL infusion  0.1-6 mcg/kg/min Intravenous Continuous Zurdo Guerrero PA-C 14 mL/hr at 06/01/24 1209 0.6 mcg/kg/min at 06/01/24 1209    polyethylene glycol (MIRALAX) Packet 17 g  17 g Oral Daily Zurdo Guerrero PA-C   17 g at 06/01/24 0902    prochlorperazine (COMPAZINE) injection 10 mg  10 mg Intravenous Q6H PRN Zurdo Guerrero PA-C        Or    prochlorperazine (COMPAZINE) tablet 10 mg  10 mg Oral Q6H PRN Zurdo Guerrero PA-C        Reason beta blocker order not selected   Does not apply DOES NOT GO TO MAR Zurdo Guerrero PA-C        senna-docusate (SENOKOT-S/PERICOLACE) 8.6-50 MG per tablet 1 tablet  1 tablet Oral BID Zurdo Guerrero PA-C   1 tablet at 06/01/24 0902    sodium chloride (PF) 0.9% PF flush 3 mL  3 mL Intracatheter Q8H Zurdo Guerrero PA-C   3 mL at 06/01/24 1133    sodium chloride (PF) 0.9% PF flush 3 mL  3 mL Intracatheter q1 min prn Zurdo Guerrero PA-C        vasopressin 0.2 units/mL in NS (PITRESSIN) standard conc infusion  0.5-4 Units/hr Intravenous Continuous PRN Zurdo Guerrero PA-C            ALLERGIES:  No Known Allergies     SOCIAL HISTORY:  Social History     Socioeconomic History    Marital status:      Spouse name: None    Number of children: None    Years of education: None    Highest education level: None   Tobacco Use    Smoking status: Never    Smokeless tobacco: Never   Substance and Sexual Activity    Alcohol use: Yes     Comment: occasional    Drug use: No     Social Determinants of Health     Financial Resource Strain: Not At Risk (2/27/2024)    [facility]    [facility] Strain     Is it hard for you to pay for the very basics like food, housing, medical care or heating?: No   Food Insecurity: Not At Risk (2/27/2024)    Received from [facility],  [facility]    Food Insecurity     Does your food run out before you have the money to buy more?: No   Transportation Needs: Not At Risk (2/27/2024)    [facility]    Transportation Needs     Does a lack of transportation keep you from your medical appointments or from getting your medications?: No       FAMILY HISTORY:  History reviewed. No pertinent family history.     PHYSICAL EXAM:  Vital Signs: BP 94/64   Pulse 102   Temp 98.2  F (36.8  C) (Oral)   Resp 20   Ht 1.829 m (6')   Wt 80.4 kg (177 lb 4 oz)   SpO2 96%   BMI 24.04 kg/m      GEN: The patient is extubated. The patient's wife and son were at the bedside.      HEENT: Pupui 2 mm bilaterally    Chest: CTA bilaterally, pectus excavatum mild.    Cor: RRR no murmur    ABD: soft and no masses.      Ext: warm and well perfused.     Neuro: intubated and sedated.      A/P: The patient is a 58 year old male with a history of ascending aortic aneurysm now s/p ascending aortic aneurysm repair. The patient is up to chair at the bedside.  The patien family members are with the patient.      Neuro: No acute issues.    Cardiac Will wean the pressors. MAP goal >65 mmHg    Pulm: The patient extubated without difficulty.      GI: NPO    : Will follow the urine output and serum Cr.     HEME:  Will transfuse for Hb <7 g/dL    ENDO: Monitor blood glucose, goal <180 mg/dL    ID:  Continue perioperative prophylaxis    CODE: Full code.     ICU Staff:    I examined the patient at the bedside in the  [facility] ICU. I managed the patient at the bedside in the  [facility] ICU for their critical illness.  I reviewed the patient's medical records, progress notes, and imaging studies.  I discussed the medical and surgical history of the patient, the physical findings, the labs and imaging studies, and the ICU care plan for the patient with the members of the interdisciplinary ICU care team. I personally  examined and evaluated the patient today in the  [facility] ICU. The patient has done well postoperatively.  I personally managed the patient's sedation, pain control and analgesia, metabolic abnormalities, nutritional status, and vasoactive medications.    Ricardo Dunn MD, PhD

## 2024-06-01 NOTE — PROGRESS NOTES
Second attempt to get patient OOB at approx 2015. Patient tolerated dangling on the side of the bed. C/O mild dizziness. Upon standing, patient became hypotensive (MAP in 40's), diaphoretic, and dizzy. Patient laid back down. Alberto and epi titrated up. Spoke with intensivist regarding additional fluid bolus. Up to this point, patient received a total of 1L of LR.   CVP 4, CI 2.7, SVV 7, .

## 2024-06-01 NOTE — PROGRESS NOTES
"1645 pt attempted OOB per protocol.  --Pt assisted to sitting on the edge of the bed.   --SBP decreased, Epi gtt increased, LR bolus initiated, sat on edge of bed with  assist of 3 for 2-4 min. Pt increased dizzy, pale, pt states \" I need to lye down\" pt assisted to supine position in bed,  SBP recovery, CT output 85 ml dark blood  -- pt family , wife and 2 brothers bedside, updated   --SVV= 5,   LR bolus per orders  -plan on pt rest , sleep then reattempt OOB  1900 bedside report to oncoming shift     "

## 2024-06-01 NOTE — PROGRESS NOTES
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 Wife also works. Other family is here visiting for one more week   Self-Care   Usual Activity Tolerance excellent   Current Activity Tolerance fair   Regular Exercise Yes   Equipment Currently Used at Home none   Fall history within last six months no   Activity/Exercise/Self-Care Comment Exercises infrequently- sometimes jogs, roller skates, or ice skates.   General Information   Onset of Illness/Injury or Date of Surgery 05/31/24   Referring Physician Zurdo Guerrero, BENJY   Patient/Family Therapy Goals Statement (PT) to get better   Pertinent History of Current Problem (include personal factors and/or comorbidities that impact the POC) \"POD #1 s/p repair of ascending aortic aneurysm\"   Existing Precautions/Restrictions fall;sternal   Weight-Bearing Status - LLE full weight-bearing   Weight-Bearing Status - RLE full weight-bearing   Cognition   Affect/Mental Status (Cognition) WNL   Pain Assessment   Patient Currently in Pain Yes, see Vital Sign flowsheet  (incision pain, tolerable)   Integumentary/Edema   Integumentary/Edema Comments WFL   Posture    Posture Forward head position   Range of Motion (ROM)   Range of Motion ROM is WFL   Strength (Manual Muscle Testing)   Strength Comments affected functionally by precautions; decreased activity tolerance following procedure   Bed Mobility   Comment, (Bed Mobility) mod assist of 1 supine to seated EOB (second assist for " lines)   Transfers   Comment, (Transfers) min assist to stand without device   Gait/Stairs (Locomotion)   Comment, (Gait/Stairs) x 2 ft to chair; limited by lines   Balance   Balance Comments steady in seated; did not assess dynamic balance   Sensory Examination   Sensory Perception patient reports no sensory changes   Clinical Impression   Criteria for Skilled Therapeutic Intervention Yes, treatment indicated   PT Diagnosis (PT) impaired functional mobility   Influenced by the following impairments decreased activity tolerance, strength, precautions   Functional limitations due to impairments bed mob, transfers, ambulation, stairs   Clinical Presentation (PT Evaluation Complexity) evolving   Clinical Presentation Rationale clinical judgement   Clinical Decision Making (Complexity) moderate complexity   Planned Therapy Interventions (PT) balance training;bed mobility training;gait training;home exercise program;neuromuscular re-education;postural re-education;patient/family education;strengthening;transfer training;stair training   Risk & Benefits of therapy have been explained evaluation/treatment results reviewed;care plan/treatment goals reviewed;risks/benefits reviewed;current/potential barriers reviewed;participants voiced agreement with care plan;participants included;patient   PT Total Evaluation Time   PT Eval, Moderate Complexity Minutes (21175) 10   Physical Therapy Goals   PT Frequency Daily   PT Predicted Duration/Target Date for Goal Attainment 06/08/24   PT Goals Stairs;Gait;Transfers;Bed Mobility   PT: Bed Mobility Modified independent;Within precautions   PT: Transfers Modified independent;Within precautions   PT: Gait Modified independent;Assistive device;Greater than 200 feet   PT: Stairs Supervision/stand-by assist;1 stair;10 stairs   Interventions   Interventions Quick Adds Therapeutic Activity;Cardiac Rehab   Therapeutic Activity   Therapeutic Activities: dynamic activities to improve functional  performance Minutes (01227) 25   Symptoms Noted During/After Treatment Fatigue   Treatment Detail/Skilled Intervention Increased time and second asssist for managing lines/tubes. Verbally reviewed sternal precautions first and strategy for bed mob. Supine to seated EOB w/ mod assist of 1, no overt symptoms in seated. Sit to stand CGA, able to achieve full upright standing. Worked on marching in place (limited distance d/t art line etc), tolerated well. Min-CGA to sit in chair, set up comfortably.   Cardiac Rehab Phase II Plan   Phase II Order Received Yes   PT Discharge Planning   PT Plan ambulation when able; consider FWW and/or chair follow as needed   PT Discharge Recommendation (DC Rec) home with assist;home with outpatient cardiac rehab   PT Rationale for DC Rec Anticipated when medically ready patient will be able to demonstrate all functional mobility he will need to d/c home with family assist as needed. At baseline patient is independent and works full time. OP CR orders placed not scheduled yet   PT Brief overview of current status min assist pivot transfer   Total Session Time   Timed Code Treatment Minutes 25   Total Session Time (sum of timed and untimed services) 35

## 2024-06-01 NOTE — PLAN OF CARE
CV  Pressors (which pressors and any increase/decrease in pressor needs): 0.5 Alberto and 0.02 of Epinephrine. Frequent titration d/t labile blood pressures with positioning/movement.   HR range: 70-80. SR  Chest tube output: 280 mL of sanguineous output from 5376-1905.     Neuro  Orientation: A&Ox4  Delirium present?(y/n): No  Sleep: Patient was able to rest in between cares  Pain: Minimal pain. 1 PRN dose of 5mg of Oxy given overnight.     GI/  BM? (y/n): No  Urine output: 555mL       Lines:  Left radial art line. Right internal jugular CVC.       Patient dangled in bed for the third time around 2300. Unable to make it to the chair d/t hypotension and c/o dizziness. During a.m. bath and turning, patient's MAP dropped to the 50's. Pressors needs increased. Will reassess dangling/ getting OOB at a later time.         Goal Outcome Evaluation:      Plan of Care Reviewed With: patient, spouse    Overall Patient Progress: improvingOverall Patient Progress: improving     Problem: Cardiovascular Surgery  Goal: Improved Activity Tolerance  Outcome: Not Progressing  Intervention: Optimize Tolerance for Activity  Recent Flowsheet Documentation  Taken 6/1/2024 0400 by Jodi White, RN  Environmental Support:   calm environment promoted   distractions minimized   rest periods encouraged  Taken 5/31/2024 2000 by Jodi White, RN  Environmental Support:   calm environment promoted   distractions minimized   rest periods encouraged  Goal: Effective Bowel Elimination  Outcome: Not Progressing     Problem: Adult Inpatient Plan of Care  Goal: Plan of Care Review  Description: The Plan of Care Review/Shift note should be completed every shift.  The Outcome Evaluation is a brief statement about your assessment that the patient is improving, declining, or no change.  This information will be displayed automatically on your shift  note.  Outcome: Progressing  Flowsheets (Taken 6/1/2024 0530)  Plan of Care Reviewed With:    patient   spouse  Overall Patient Progress: improving     Problem: Cardiovascular Surgery  Goal: Absence of Bleeding  Outcome: Progressing  Intervention: Monitor and Manage Bleeding  Recent Flowsheet Documentation  Taken 6/1/2024 0400 by Jodi White RN  Bleeding Management: dressing monitored  Taken 5/31/2024 2000 by Jodi White RN  Bleeding Management: dressing monitored  Goal: Effective Cardiac Function  Outcome: Progressing  Intervention: Optimize Cardiac Output and Blood Flow  Recent Flowsheet Documentation  Taken 5/31/2024 2000 by Jodi White RN  Dysrhythmia Management: pacing wires maintained  Goal: Optimal Cerebral Tissue Perfusion  Outcome: Progressing  Intervention: Protect and Optimize Cerebral Perfusion  Recent Flowsheet Documentation  Taken 6/1/2024 0400 by Jodi White RN  Sensory Stimulation Regulation:   lighting decreased   care clustered   quiet environment promoted   visual stimulation minimized  Cerebral Perfusion Promotion: blood pressure monitored  Head of Bed (HOB) Positioning: HOB at 30 degrees  Taken 6/1/2024 0200 by Jodi White RN  Head of Bed (HOB) Positioning: HOB at 30-45 degrees  Taken 6/1/2024 0000 by Jodi White RN  Head of Bed (HOB) Positioning: HOB at 20-30 degrees  Taken 5/31/2024 2307 by Jodi White RN  Head of Bed (HOB) Positioning: HOB at 20-30 degrees  Taken 5/31/2024 2200 by Jodi White RN  Head of Bed (HOB) Positioning: HOB at 30 degrees  Taken 5/31/2024 2036 by Jodi White RN  Head of Bed (HOB) Positioning: HOB at 20-30 degrees  Taken 5/31/2024 2000 by Jodi White RN  Sensory Stimulation Regulation:   lighting decreased   care clustered   quiet environment promoted   visual stimulation minimized  Cerebral Perfusion Promotion: blood pressure monitored  Goal: Fluid and Electrolyte Balance  Outcome: Progressing  Goal: Blood Glucose Level Within Targeted Range  Outcome: Progressing  Goal: Absence of  Infection Signs and Symptoms  Outcome: Progressing  Intervention: Prevent or Manage Infection  Recent Flowsheet Documentation  Taken 6/1/2024 0400 by Jodi White RN  Infection Prevention:   cohorting utilized   environmental surveillance performed   equipment surfaces disinfected   hand hygiene promoted   personal protective equipment utilized   rest/sleep promoted   single patient room provided  Taken 6/1/2024 0000 by Jodi White RN  Infection Prevention:   cohorting utilized   environmental surveillance performed   equipment surfaces disinfected   hand hygiene promoted   personal protective equipment utilized   rest/sleep promoted   single patient room provided  Taken 5/31/2024 2000 by Jodi White RN  Infection Prevention:   cohorting utilized   environmental surveillance performed   equipment surfaces disinfected   hand hygiene promoted   personal protective equipment utilized   rest/sleep promoted   single patient room provided  Goal: Acceptable Pain Control  Outcome: Progressing  Intervention: Prevent or Manage Pain  Recent Flowsheet Documentation  Taken 6/1/2024 0008 by Jodi White RN  Pain Management Interventions: medication offered but refused  Goal: Nausea and Vomiting Relief  Outcome: Progressing  Intervention: Prevent or Manage Nausea and Vomiting  Recent Flowsheet Documentation  Taken 6/1/2024 0059 by Jodi White RN  Nausea/Vomiting Interventions:   antiemetic   cool cloth applied   slow deep breathing encouraged   stimuli minimized  Goal: Effective Urinary Elimination  Outcome: Progressing  Goal: Effective Oxygenation and Ventilation  Outcome: Progressing  Intervention: Promote Airway Secretion Clearance  Recent Flowsheet Documentation  Taken 6/1/2024 0400 by Jodi White RN  Cough And Deep Breathing: done independently per patient  Taken 6/1/2024 0000 by Jodi White RN  Cough And Deep Breathing: done independently per patient  Taken 5/31/2024 2000  by Jodi White RN  Administration (IS):   proper technique demonstrated   self-administered  Level Incentive Spirometer (mL): 600  Cough And Deep Breathing: done independently per patient  Number of Repetitions (IS): 5  Patient Tolerance (IS): fair  Intervention: Optimize Oxygenation and Ventilation  Recent Flowsheet Documentation  Taken 6/1/2024 0400 by Jodi White, RN  Chest Tube Safety:   all connections secured   suction checked  Taken 5/31/2024 2000 by Jodi White, RN  Chest Tube Safety:   all connections secured   suction checked

## 2024-06-02 ENCOUNTER — APPOINTMENT (OUTPATIENT)
Dept: PHYSICAL THERAPY | Facility: CLINIC | Age: 59
DRG: 220 | End: 2024-06-02
Attending: SURGERY
Payer: COMMERCIAL

## 2024-06-02 LAB
CA-I BLD-MCNC: 4.7 MG/DL (ref 4.4–5.2)
GLUCOSE BLDC GLUCOMTR-MCNC: 105 MG/DL (ref 70–99)
GLUCOSE BLDC GLUCOMTR-MCNC: 107 MG/DL (ref 70–99)
GLUCOSE BLDC GLUCOMTR-MCNC: 119 MG/DL (ref 70–99)
GLUCOSE BLDC GLUCOMTR-MCNC: 131 MG/DL (ref 70–99)
GLUCOSE BLDC GLUCOMTR-MCNC: 151 MG/DL (ref 70–99)
MAGNESIUM SERPL-MCNC: 1.9 MG/DL (ref 1.7–2.3)
PHOSPHATE SERPL-MCNC: 1.7 MG/DL (ref 2.5–4.5)
PHOSPHATE SERPL-MCNC: 2 MG/DL (ref 2.5–4.5)
POTASSIUM SERPL-SCNC: 3.5 MMOL/L (ref 3.4–5.3)

## 2024-06-02 PROCEDURE — 250N000011 HC RX IP 250 OP 636: Performed by: PHYSICIAN ASSISTANT

## 2024-06-02 PROCEDURE — 97530 THERAPEUTIC ACTIVITIES: CPT | Mod: GP

## 2024-06-02 PROCEDURE — 250N000013 HC RX MED GY IP 250 OP 250 PS 637: Performed by: SURGERY

## 2024-06-02 PROCEDURE — 258N000003 HC RX IP 258 OP 636: Performed by: SURGERY

## 2024-06-02 PROCEDURE — 84132 ASSAY OF SERUM POTASSIUM: CPT | Performed by: PHYSICIAN ASSISTANT

## 2024-06-02 PROCEDURE — 82330 ASSAY OF CALCIUM: CPT | Performed by: PHYSICIAN ASSISTANT

## 2024-06-02 PROCEDURE — 120N000013 HC R&B IMCU

## 2024-06-02 PROCEDURE — 83735 ASSAY OF MAGNESIUM: CPT | Performed by: PHYSICIAN ASSISTANT

## 2024-06-02 PROCEDURE — 97110 THERAPEUTIC EXERCISES: CPT | Mod: GP

## 2024-06-02 PROCEDURE — 250N000009 HC RX 250: Performed by: SURGERY

## 2024-06-02 PROCEDURE — 84100 ASSAY OF PHOSPHORUS: CPT | Performed by: PHYSICIAN ASSISTANT

## 2024-06-02 PROCEDURE — 250N000013 HC RX MED GY IP 250 OP 250 PS 637: Performed by: PHYSICIAN ASSISTANT

## 2024-06-02 PROCEDURE — 250N000011 HC RX IP 250 OP 636: Performed by: SURGERY

## 2024-06-02 PROCEDURE — 36415 COLL VENOUS BLD VENIPUNCTURE: CPT | Performed by: PHYSICIAN ASSISTANT

## 2024-06-02 RX ORDER — FUROSEMIDE 10 MG/ML
20 INJECTION INTRAMUSCULAR; INTRAVENOUS DAILY
Status: DISCONTINUED | OUTPATIENT
Start: 2024-06-02 | End: 2024-06-03 | Stop reason: HOSPADM

## 2024-06-02 RX ORDER — MAGNESIUM SULFATE HEPTAHYDRATE 40 MG/ML
2 INJECTION, SOLUTION INTRAVENOUS ONCE
Status: COMPLETED | OUTPATIENT
Start: 2024-06-02 | End: 2024-06-02

## 2024-06-02 RX ORDER — POTASSIUM CHLORIDE 1500 MG/1
20 TABLET, EXTENDED RELEASE ORAL ONCE
Status: COMPLETED | OUTPATIENT
Start: 2024-06-02 | End: 2024-06-02

## 2024-06-02 RX ADMIN — POTASSIUM & SODIUM PHOSPHATES POWDER PACK 280-160-250 MG 1 PACKET: 280-160-250 PACK at 23:35

## 2024-06-02 RX ADMIN — GABAPENTIN 100 MG: 100 CAPSULE ORAL at 20:35

## 2024-06-02 RX ADMIN — LATANOPROST 1 DROP: 50 SOLUTION/ DROPS OPHTHALMIC at 21:01

## 2024-06-02 RX ADMIN — PANTOPRAZOLE SODIUM 40 MG: 40 TABLET, DELAYED RELEASE ORAL at 08:37

## 2024-06-02 RX ADMIN — DORZOLAMIDE HYDROCHLORIDE AND TIMOLOL MALEATE 1 DROP: 20; 5 SOLUTION/ DROPS OPHTHALMIC at 20:57

## 2024-06-02 RX ADMIN — HEPARIN SODIUM 5000 UNITS: 5000 INJECTION, SOLUTION INTRAVENOUS; SUBCUTANEOUS at 04:22

## 2024-06-02 RX ADMIN — POTASSIUM & SODIUM PHOSPHATES POWDER PACK 280-160-250 MG 1 PACKET: 280-160-250 PACK at 15:34

## 2024-06-02 RX ADMIN — HEPARIN SODIUM 5000 UNITS: 5000 INJECTION, SOLUTION INTRAVENOUS; SUBCUTANEOUS at 20:35

## 2024-06-02 RX ADMIN — METOPROLOL TARTRATE 12.5 MG: 25 TABLET, FILM COATED ORAL at 20:27

## 2024-06-02 RX ADMIN — METOPROLOL TARTRATE 12.5 MG: 25 TABLET, FILM COATED ORAL at 08:38

## 2024-06-02 RX ADMIN — FUROSEMIDE 20 MG: 10 INJECTION, SOLUTION INTRAMUSCULAR; INTRAVENOUS at 08:36

## 2024-06-02 RX ADMIN — ACETAMINOPHEN 975 MG: 325 TABLET, FILM COATED ORAL at 15:34

## 2024-06-02 RX ADMIN — SENNOSIDES AND DOCUSATE SODIUM 1 TABLET: 50; 8.6 TABLET ORAL at 08:36

## 2024-06-02 RX ADMIN — HEPARIN SODIUM 5000 UNITS: 5000 INJECTION, SOLUTION INTRAVENOUS; SUBCUTANEOUS at 13:07

## 2024-06-02 RX ADMIN — SENNOSIDES AND DOCUSATE SODIUM 1 TABLET: 50; 8.6 TABLET ORAL at 20:35

## 2024-06-02 RX ADMIN — ACETAMINOPHEN 975 MG: 325 TABLET, FILM COATED ORAL at 08:37

## 2024-06-02 RX ADMIN — SODIUM PHOSPHATE, MONOBASIC, MONOHYDRATE AND SODIUM PHOSPHATE, DIBASIC, ANHYDROUS 9 MMOL: 142; 276 INJECTION, SOLUTION INTRAVENOUS at 06:42

## 2024-06-02 RX ADMIN — POLYETHYLENE GLYCOL 3350 17 G: 17 POWDER, FOR SOLUTION ORAL at 08:37

## 2024-06-02 RX ADMIN — GABAPENTIN 100 MG: 100 CAPSULE ORAL at 08:36

## 2024-06-02 RX ADMIN — DORZOLAMIDE HYDROCHLORIDE AND TIMOLOL MALEATE 1 DROP: 20; 5 SOLUTION/ DROPS OPHTHALMIC at 08:36

## 2024-06-02 RX ADMIN — ACETAMINOPHEN 975 MG: 325 TABLET, FILM COATED ORAL at 00:33

## 2024-06-02 RX ADMIN — POTASSIUM & SODIUM PHOSPHATES POWDER PACK 280-160-250 MG 1 PACKET: 280-160-250 PACK at 20:27

## 2024-06-02 RX ADMIN — ASPIRIN 81 MG CHEWABLE TABLET 81 MG: 81 TABLET CHEWABLE at 08:37

## 2024-06-02 RX ADMIN — ACETAMINOPHEN 975 MG: 325 TABLET, FILM COATED ORAL at 23:35

## 2024-06-02 RX ADMIN — SODIUM PHOSPHATE, MONOBASIC, MONOHYDRATE AND SODIUM PHOSPHATE, DIBASIC, ANHYDROUS 9 MMOL: 142; 276 INJECTION, SOLUTION INTRAVENOUS at 08:45

## 2024-06-02 RX ADMIN — POTASSIUM CHLORIDE 20 MEQ: 1500 TABLET, EXTENDED RELEASE ORAL at 06:42

## 2024-06-02 RX ADMIN — MAGNESIUM SULFATE HEPTAHYDRATE 2 G: 40 INJECTION, SOLUTION INTRAVENOUS at 06:42

## 2024-06-02 RX ADMIN — GABAPENTIN 100 MG: 100 CAPSULE ORAL at 15:34

## 2024-06-02 ASSESSMENT — ACTIVITIES OF DAILY LIVING (ADL)
ADLS_ACUITY_SCORE: 26
ADLS_ACUITY_SCORE: 26
ADLS_ACUITY_SCORE: 28
ADLS_ACUITY_SCORE: 26
ADLS_ACUITY_SCORE: 25
ADLS_ACUITY_SCORE: 26
ADLS_ACUITY_SCORE: 28
ADLS_ACUITY_SCORE: 25
ADLS_ACUITY_SCORE: 28
ADLS_ACUITY_SCORE: 25
ADLS_ACUITY_SCORE: 25
ADLS_ACUITY_SCORE: 26
ADLS_ACUITY_SCORE: 28
ADLS_ACUITY_SCORE: 26

## 2024-06-02 NOTE — PLAN OF CARE
([id number])  Neuro: Intact.    CV: SR/ST 90s-110s.    Resp: RA. Diminished bases.    GI: No BM since surgery.    : Morrell removed at 1247.    Skin: Midline surgical chest incision.    Activity: SBA/Ax1 with heart pillow    Other: Family updated at bedside. Transfer out of ICU to [facility].

## 2024-06-02 NOTE — PLAN OF CARE
CV  Pressors (which pressors and any increase/decrease in pressor needs): Alberto infusion titrated off since 0035  HR range: 100-110's  Chest tube output: 20-40 mL/hr    Neuro  Orientation: A/Ox4   Delirium present?(y/n): No  Sleep: in between cares  Pain: 0-6/10.  PRN and scheduled medications given (See MAR) with minimal relief per Pt.  Pt declining PRN medications this AM.    GI/  BM? (y/n): no  Urine output: 100-225 mL q2hr      Lines:  Artline and CVC  Goal Outcome Evaluation: progress      Plan of Care Reviewed With: patient    Overall Patient Progress: improvingOverall Patient Progress: improving    Outcome Evaluation: Pt neurologically intact and resting/sleeping in between cares.  Pt ST with HR in 110's.  Alberto infusion was titrated off at 0035 (see MAR).   Pt on RA.  No new skin issues noted this shift.        Problem: Adult Inpatient Plan of Care  Goal: Plan of Care Review  Description: The Plan of Care Review/Shift note should be completed every shift.  The Outcome Evaluation is a brief statement about your assessment that the patient is improving, declining, or no change.  This information will be displayed automatically on your shift  note.  Outcome: Progressing  Flowsheets (Taken 6/2/2024 0505)  Outcome Evaluation: Pt neurologically intact and resting/sleeping in between cares.  Pt ST with HR in 110's.  Alberto infusion was titrated off at 0035 (see MAR).   Pt on RA.  No new skin issues noted this shift.  Plan of Care Reviewed With: patient  Overall Patient Progress: improving  Goal: Readiness for Transition of Care  Outcome: Progressing     Problem: Cardiovascular Surgery  Goal: Improved Activity Tolerance  Outcome: Progressing  Intervention: Optimize Tolerance for Activity  Recent Flowsheet Documentation  Taken 6/2/2024 0400 by Iesha Hickman, RN  Environmental Support:   calm environment promoted   distractions minimized   rest periods encouraged   personal routine supported  Taken 6/2/2024 0000 by  Iesha Hickman RN  Environmental Support:   calm environment promoted   distractions minimized   rest periods encouraged   personal routine supported  Taken 6/1/2024 2000 by Iesha Hickman RN  Environmental Support:   calm environment promoted   distractions minimized   rest periods encouraged   personal routine supported  Goal: Acceptable Pain Control  Outcome: Progressing  Intervention: Prevent or Manage Pain  Recent Flowsheet Documentation  Taken 6/2/2024 0400 by Iesha Hickman, RN  Pain Management Interventions:   rest   repositioned   quiet environment facilitated   pillow support provided   medication offered but refused   environmental changes   care clustered  Taken 6/1/2024 2148 by Iesha Hickman, RN  Pain Management Interventions:   medication (see MAR)   rest   repositioned   environmental changes   emotional support   care clustered  Goal: Nausea and Vomiting Relief  Outcome: Progressing  Goal: Effective Urinary Elimination  Outcome: Progressing  Intervention: Monitor and Manage Urinary Retention  Recent Flowsheet Documentation  Taken 6/2/2024 0400 by Iesha Hickman RN  Urinary Elimination Promotion: catheter patency maintained  Taken 6/2/2024 0000 by Iesha Hickman RN  Urinary Elimination Promotion: catheter patency maintained  Taken 6/1/2024 2000 by Iesha Hickman RN  Urinary Elimination Promotion: catheter patency maintained  Goal: Effective Oxygenation and Ventilation  Outcome: Progressing  Intervention: Promote Airway Secretion Clearance  Recent Flowsheet Documentation  Taken 6/2/2024 0400 by Iesha Hickman RN  Administration (IS):   self-administered   proper technique demonstrated   instruction provided, follow-up  Cough And Deep Breathing: done independently per patient  Patient Tolerance (IS): good  Taken 6/2/2024 0000 by Iesha Hickman RN  Administration (IS):   self-administered   proper technique demonstrated   instruction provided, follow-up  Cough And Deep Breathing: done  independently per patient  Patient Tolerance (IS): good  Taken 6/1/2024 2000 by Iesha Hickman RN  Administration (IS):   self-administered   proper technique demonstrated   instruction provided, follow-up  Level Incentive Spirometer (mL): 1000  Cough And Deep Breathing: done independently per patient  Number of Repetitions (IS): 10  Patient Tolerance (IS): good  Intervention: Optimize Oxygenation and Ventilation  Recent Flowsheet Documentation  Taken 6/2/2024 0400 by Iesha Hickman RN  Chest Tube Safety:   all connections secured   suction checked  Taken 6/2/2024 0000 by Iesha Hickman RN  Chest Tube Safety:   all connections secured   suction checked  Taken 6/1/2024 2000 by Iesha Hickman RN  Chest Tube Safety:   all connections secured   suction checked     Problem: Comorbidity Management  Goal: Blood Pressure in Desired Range  Outcome: Progressing  Intervention: Maintain Blood Pressure Management  Recent Flowsheet Documentation  Taken 6/2/2024 0400 by Iesha Hickman, RN  Medication Review/Management:   medications reviewed   high-risk medications identified   infusion titrated  Taken 6/2/2024 0000 by Iesha Hickman RN  Medication Review/Management:   medications reviewed   high-risk medications identified   infusion titrated  Taken 6/1/2024 2000 by Iesha Hickman RN  Medication Review/Management:   medications reviewed   high-risk medications identified   infusion titrated

## 2024-06-02 NOTE — PLAN OF CARE
Orientation: A/O x4   Vitals: VSS on RA   Mobility: SBA GB  Diet: tolerating regular diet  GI/: continent BB; voiding adequately; no BM; +BS  Pain: scheduled tylenol and gabapentin given for pain   Drains/Devices: PIV x1 saline locked  Skin: sternal incision; CT removal sites  Tele: ST

## 2024-06-02 NOTE — PROGRESS NOTES
[facility]  Cardiovascular and Thoracic Surgery Daily Note      Assessment and Plan  POD #2 s/p repair of ascending aortic aneurysm on 5/31/2024 with Dr. Torres    - CVS: Pre-op TTE with EF 60-65%  No regional WMA  Trace mitral regurg  Trace tricuspid regurg  Carotid US (4/16/24) - less than 50% stenosis of bilateral ICA    BP 90-130s/40-60s, HR 100s, weaned off of all gtt, will start lopressor 12.5 mg PO bid with parameters, lasix 20 mg IV daily, aspirin 81 mg, no hypercholesterolemia, CT output decreased and serous, Will remove CT/TPWs    - Resp: Extubated POD #0 at 1359. Saturating well on RA. Working with IS, pulmonary toilet.    - Neuro: Neuro intact, pain controlled on current regimen    - Renal: No history of significant renal disease. Trend BMP. Diuretic as above.  Recent Labs   Lab 06/01/24  0446 05/31/24  1138 05/31/24  0942   CR 0.76 0.79 0.75       - GI: No BM, +flatus, continue bowel regimen    - : ok to remove martins today    - Endo: sliding scale insulin    Hemoglobin A1C   Date Value Ref Range Status   04/16/2024 5.5 <5.7 % Final     Comment:     Normal <5.7%   Prediabetes 5.7-6.4%    Diabetes 6.5% or higher     Note: Adopted from ADA consensus guidelines.        - FEN: Replace electrolytes as needed. Advance to regular diet as tolerated.     - ID: Afebrile. Finished periop abx prophylaxis. Trend CBC and fever curve.   Recent Labs   Lab 06/01/24  0446 05/31/24  1138 05/31/24  0942   WBC 10.4 9.1 7.3       - Heme: Acute blood loss anemia adue to surgery. Trend CBC, transfuse PRN.   Recent Labs   Lab 06/01/24  0446 05/31/24  1138 05/31/24  0950 05/31/24  0942   HGB 9.6* 11.4* 10.6* 10.4*    133*  --  135*       - Proph: SCD, subcutaneous heparin, PPI    - Other:  Clinically Significant Risk Factors          # Hypocalcemia: Lowest iCa = 3.8 mg/dL in last 2 days, will monitor and replace as appropriate  # Hypercalcemia: Highest iCa = 5.3 mg/dL in last 2 days, will monitor as  appropriate    # Hypoalbuminemia: Lowest albumin = 3.1 g/dL at 6/1/2024  4:46 AM, will monitor as appropriate    # Coagulation Defect: INR = 1.46 (Ref range: 0.85 - 1.15) and/or PTT = 73 Seconds (Ref range: 22 - 38 Seconds), will monitor for bleeding    # Hypertension: Noted on problem list                   - Dispo: Encouraged IS/TCDB/amb. Sternal precautions. Off of all gtt. Start lopressor 12.5 mg PO bid with parameters. Lasix 20 mg IV daily. Will remove chest tubes and martins. CXR in am tomorrow. Ok to transfer to the step down unit. Looking at possible discharge to home on Tuesday. Continue to monitor.     Interval History  States that pain is being controlled. Denies any hallucinations. Breathing is ok. Working with IS. Appetite is ok. Denies any nausea. +flatus/-BM. Denies any popping/clicking in his breast bone. Nausea/dizziness resolved. Was able to get some sleep. Did march in place yesterday.      Medications  Current Facility-Administered Medications   Medication Dose Route Frequency Provider Last Rate Last Admin    acetaminophen (TYLENOL) tablet 975 mg  975 mg Oral Q8H Zurdo Guerrero PA-C   975 mg at 06/02/24 0033    aspirin (ASA) chewable tablet 81 mg  81 mg Oral or NG Tube Daily Zurdo Guerrero PA-C   81 mg at 06/01/24 0901    dorzolamide-timolol (COSOPT) ophthalmic solution 1 drop  1 drop Both Eyes BID Zurdo Guerrero PA-C   1 drop at 06/01/24 2148    furosemide (LASIX) injection 20 mg  20 mg Intravenous Daily Zurdo Guerrero PA-C        gabapentin (NEURONTIN) capsule 100 mg  100 mg Oral TID Zurdo Guerrero PA-C   100 mg at 06/01/24 2148    heparin ANTICOAGULANT injection 5,000 Units  5,000 Units Subcutaneous Q8H Zurdo Guerrero PA-C   5,000 Units at 06/02/24 0422    insulin aspart (NovoLOG) injection (RAPID ACTING)  1-7 Units Subcutaneous TID AC Zurdo Guerrero PA-C   2 Units at 06/01/24 1129    insulin aspart (NovoLOG) injection (RAPID ACTING)  1-5 Units Subcutaneous At Bedtime Zurdo Guerrero  BENJY CARIAS        latanoprost (XALATAN) 0.005 % ophthalmic solution 1 drop  1 drop Both Eyes At Bedtime Zurdo Guerrero PA-C   1 drop at 06/01/24 2148    metoprolol tartrate (LOPRESSOR) half-tab 12.5 mg  12.5 mg Oral BID Zurdo Guerrero PA-C        pantoprazole (PROTONIX) EC tablet 40 mg  40 mg Oral Daily Zurdo Guerrero PA-C   40 mg at 06/01/24 1129    polyethylene glycol (MIRALAX) Packet 17 g  17 g Oral Daily Zurdo Guerrero PA-C   17 g at 06/01/24 0902    senna-docusate (SENOKOT-S/PERICOLACE) 8.6-50 MG per tablet 1 tablet  1 tablet Oral BID Zurdo Guerrero PA-C   1 tablet at 06/01/24 2015    sodium chloride (PF) 0.9% PF flush 3 mL  3 mL Intracatheter Q8H Zurdo Guerrero PA-C   3 mL at 06/02/24 0422    sodium phosphate 9 mmol in 250 mL NS intermittent infusion  9 mmol Intravenous Q2H Candis Torres MD   9 mmol at 06/02/24 0642     Current Facility-Administered Medications   Medication Dose Route Frequency Provider Last Rate Last Admin    [START ON 6/3/2024] acetaminophen (TYLENOL) tablet 650 mg  650 mg Oral Q4H PRN Zurdo Guerrero PA-C        [START ON 6/3/2024] bisacodyl (DULCOLAX) suppository 10 mg  10 mg Rectal Daily PRN Zurdo Guerrero PA-C        calcium carbonate (TUMS) chewable tablet 500 mg  500 mg Oral 4x Daily PRN Zurdo Guerrero PA-C        calcium gluconate 1 g in 50 mL in sodium chloride intermittent infusion  1 g Intravenous Q6H PRN Zurdo Guerrero PA-C        calcium gluconate 2 g in  mL intermittent infusion  2 g Intravenous Q6H PRN Zurdo Guerrero PA-C        calcium gluconate 3 g in sodium chloride 0.9 % 100 mL intermittent infusion  3 g Intravenous Q6H PRN Zurdo Guerrero PA-C        glucose gel 15-30 g  15-30 g Oral Q15 Min PRN Zurdo Guerrero PA-C        Or    dextrose 50 % injection 25-50 mL  25-50 mL Intravenous Q15 Min PRN Zurdo Guerrero PA-C        Or    glucagon injection 1 mg  1 mg Subcutaneous Q15 Min PRN Zurdo Guerrero PA-C        EPINEPHrine (ADRENALIN) 5  mg in  mL infusion  0.01-0.1 mcg/kg/min Intravenous Continuous PRN Zurdo Guerrero PA-C   Stopped at 06/01/24 0910    hydrALAZINE (APRESOLINE) injection 10 mg  10 mg Intravenous Q30 Min PRN Zurdo Guerrero PA-C        HYDROmorphone (DILAUDID) injection 0.2 mg  0.2 mg Intravenous Q2H PRN Zurdo Guerrero PA-C        Or    HYDROmorphone (DILAUDID) injection 0.4 mg  0.4 mg Intravenous Q2H PRN Zurdo Guerrero PA-C        lactated ringers BOLUS 250 mL  250 mL Intravenous Q10 Min PRN Zurdo Guerrero PA-C   250 mL at 05/31/24 1843    lidocaine (LMX4) cream   Topical Q1H PRN Zurdo Guerrero PA-C        lidocaine 1 % 0.1-1 mL  0.1-1 mL Other Q1H PRN Zurdo Guerrero PA-C        magnesium hydroxide (MILK OF MAGNESIA) suspension 30 mL  30 mL Oral Daily PRN Zurdo Guerrero PA-C        methocarbamol (ROBAXIN) tablet 750 mg  750 mg Oral Q6H PRN Zurdo Guerrero PA-C   750 mg at 06/01/24 2015    naloxone (NARCAN) injection 0.2 mg  0.2 mg Intravenous Q2 Min PRN Candis Torres MD        Or    naloxone (NARCAN) injection 0.4 mg  0.4 mg Intravenous Q2 Min PRN Candis Torres MD        Or    naloxone (NARCAN) injection 0.2 mg  0.2 mg Intramuscular Q2 Min PRN Candis Torres MD        Or    naloxone (NARCAN) injection 0.4 mg  0.4 mg Intramuscular Q2 Min PRN Candis Torres MD        norepinephrine (LEVOPHED) 16 mg in  mL infusion MAX CONC CENTRAL LINE  0.01-0.4 mcg/kg/min Intravenous Continuous PRN Zurdo Guerrero PA-C        ondansetron (ZOFRAN ODT) ODT tab 4 mg  4 mg Oral Q6H PRN Zurdo Guerrero PA-C        Or    ondansetron (ZOFRAN) injection 4 mg  4 mg Intravenous Q6H PRN Zurdo Guerrero PA-C   4 mg at 06/01/24 0051    oxyCODONE (ROXICODONE) tablet 5 mg  5 mg Oral Q4H PRN Zurdo Guerrero PA-C   5 mg at 06/01/24 2149    Or    oxyCODONE (ROXICODONE) tablet 10 mg  10 mg Oral Q4H PRN Zurdo Guerrero PA-C        prochlorperazine (COMPAZINE) injection 10 mg  10 mg Intravenous Q6H  PRN Zurdo Guerrero PA-C        Or    prochlorperazine (COMPAZINE) tablet 10 mg  10 mg Oral Q6H PRN Zurdo Guerrero PA-C        Reason beta blocker order not selected   Does not apply DOES NOT GO TO Zurdo Chawla PA-C        sodium chloride (PF) 0.9% PF flush 3 mL  3 mL Intracatheter q1 min prn Zurdo Guerrero PA-C        vasopressin 0.2 units/mL in NS (PITRESSIN) standard conc infusion  0.5-4 Units/hr Intravenous Continuous PRN Zurdo Guerrero PA-C             Physical Exam  Vitals were reviewed  Blood pressure 94/64, pulse 103, temperature 99.1  F (37.3  C), temperature source Oral, resp. rate 25, height 1.829 m (6'), weight 82 kg (180 lb 12.4 oz), SpO2 94%.  Gen: lying in bed, comfortable, -O2    Lungs: diminished bases,  ml    Cardiovascular: RRR, telemetry SR 80s, -edema LE    Abdomen: BS+, NT/ND, soft    Derm: sternal incision D/I    CT: decrease serous output, no air leak    CXR (6/1/24) - extubated, chest tubes in place, good aeration    Weight:   Vitals:    05/31/24 0521 05/31/24 1200 06/01/24 0400 06/02/24 0430   Weight: 77.6 kg (171 lb) 78.9 kg (173 lb 15.1 oz) 80.4 kg (177 lb 4 oz) 82 kg (180 lb 12.4 oz)         Data  Recent Labs   Lab 06/02/24  0427 06/01/24  2202 06/01/24  1611 06/01/24  1127 06/01/24  0613 06/01/24  0446 05/31/24  1257 05/31/24  1138 05/31/24  0950 05/31/24  0942 05/31/24  0751 05/31/24  0549   WBC  --   --   --   --   --  10.4  --  9.1  --  7.3  --  5.2   HGB  --   --   --   --   --  9.6*  --  11.4* 10.6* 10.4*   < > 13.6   MCV  --   --   --   --   --  89  --  88  --  88  --  88   PLT  --   --   --   --   --  153  --  133*  --  135*  --  194   INR  --   --   --   --   --   --   --  1.46*  --  1.42*  --  1.08   NA  --   --   --   --   --  136  --  142 141 139   < > 140   POTASSIUM 3.5  --   --   --   --  3.7  --  3.7 4.0 4.2   < > 3.5   CHLORIDE  --   --   --   --   --  103  --  111*  --  108*  --  103   CO2  --   --   --   --   --  26  --  23  --  24  --  27   BUN   --   --   --   --   --  15.3  --  13.6  --  14.5  --  18.1   CR  --   --   --   --   --  0.76  --  0.79  --  0.75  --  0.98   ANIONGAP  --   --   --   --   --  7  --  8  --  7  --  10   HALEY  --   --   --   --   --  7.8*  --  8.1*  --  9.2  --  9.1   GLC  --  134* 118* 199*   < > 133*   < > 154*  163* 117* 120*   < > 103*   ALBUMIN  --   --   --   --   --  3.1*  --  3.2*  --   --   --  3.8   PROTTOTAL  --   --   --   --   --  5.1*  --  5.0*  --   --   --  7.2   BILITOTAL  --   --   --   --   --  0.6  --  0.9  --   --   --  0.4   ALKPHOS  --   --   --   --   --  47  --  52  --   --   --  93   ALT  --   --   --   --   --  12  --  11  --   --   --  16   AST  --   --   --   --   --  27  --  28  --   --   --  19    < > = values in this interval not displayed.       Imaging:  No results found for this or any previous visit (from the past 24 hour(s)).      Patient seen and discussed with Dr Daisy Guerrero PA-C  Cardiothoracic Surgery

## 2024-06-03 ENCOUNTER — APPOINTMENT (OUTPATIENT)
Dept: PHYSICAL THERAPY | Facility: CLINIC | Age: 59
DRG: 220 | End: 2024-06-03
Attending: SURGERY
Payer: COMMERCIAL

## 2024-06-03 ENCOUNTER — APPOINTMENT (OUTPATIENT)
Dept: GENERAL RADIOLOGY | Facility: CLINIC | Age: 59
DRG: 220 | End: 2024-06-03
Attending: PHYSICIAN ASSISTANT
Payer: COMMERCIAL

## 2024-06-03 VITALS
OXYGEN SATURATION: 99 % | RESPIRATION RATE: 16 BRPM | WEIGHT: 177.25 LBS | HEIGHT: 72 IN | DIASTOLIC BLOOD PRESSURE: 90 MMHG | HEART RATE: 148 BPM | TEMPERATURE: 98.1 F | BODY MASS INDEX: 24.01 KG/M2 | SYSTOLIC BLOOD PRESSURE: 125 MMHG

## 2024-06-03 LAB
ANION GAP SERPL CALCULATED.3IONS-SCNC: 9 MMOL/L (ref 7–15)
BUN SERPL-MCNC: 9.8 MG/DL (ref 8–23)
CALCIUM SERPL-MCNC: 8.3 MG/DL (ref 8.8–10.2)
CHLORIDE SERPL-SCNC: 104 MMOL/L (ref 98–107)
CREAT SERPL-MCNC: 0.72 MG/DL (ref 0.67–1.17)
DEPRECATED HCO3 PLAS-SCNC: 27 MMOL/L (ref 22–29)
EGFRCR SERPLBLD CKD-EPI 2021: >90 ML/MIN/1.73M2
ERYTHROCYTE [DISTWIDTH] IN BLOOD BY AUTOMATED COUNT: 12.6 % (ref 10–15)
GLUCOSE BLDC GLUCOMTR-MCNC: 108 MG/DL (ref 70–99)
GLUCOSE SERPL-MCNC: 114 MG/DL (ref 70–99)
HCT VFR BLD AUTO: 23.8 % (ref 40–53)
HGB BLD-MCNC: 8 G/DL (ref 13.3–17.7)
MAGNESIUM SERPL-MCNC: 2 MG/DL (ref 1.7–2.3)
MCH RBC QN AUTO: 30.1 PG (ref 26.5–33)
MCHC RBC AUTO-ENTMCNC: 33.6 G/DL (ref 31.5–36.5)
MCV RBC AUTO: 90 FL (ref 78–100)
PHOSPHATE SERPL-MCNC: 1.8 MG/DL (ref 2.5–4.5)
PLATELET # BLD AUTO: 108 10E3/UL (ref 150–450)
POTASSIUM SERPL-SCNC: 3.4 MMOL/L (ref 3.4–5.3)
RBC # BLD AUTO: 2.66 10E6/UL (ref 4.4–5.9)
SODIUM SERPL-SCNC: 140 MMOL/L (ref 135–145)
WBC # BLD AUTO: 6.9 10E3/UL (ref 4–11)

## 2024-06-03 PROCEDURE — 71046 X-RAY EXAM CHEST 2 VIEWS: CPT

## 2024-06-03 PROCEDURE — 85014 HEMATOCRIT: CPT | Performed by: PHYSICIAN ASSISTANT

## 2024-06-03 PROCEDURE — 97530 THERAPEUTIC ACTIVITIES: CPT | Mod: GP

## 2024-06-03 PROCEDURE — 83735 ASSAY OF MAGNESIUM: CPT | Performed by: PHYSICIAN ASSISTANT

## 2024-06-03 PROCEDURE — 97110 THERAPEUTIC EXERCISES: CPT | Mod: GP

## 2024-06-03 PROCEDURE — 250N000011 HC RX IP 250 OP 636: Performed by: PHYSICIAN ASSISTANT

## 2024-06-03 PROCEDURE — 84100 ASSAY OF PHOSPHORUS: CPT | Performed by: SURGERY

## 2024-06-03 PROCEDURE — 36415 COLL VENOUS BLD VENIPUNCTURE: CPT | Performed by: PHYSICIAN ASSISTANT

## 2024-06-03 PROCEDURE — 250N000013 HC RX MED GY IP 250 OP 250 PS 637: Performed by: PHYSICIAN ASSISTANT

## 2024-06-03 PROCEDURE — 80048 BASIC METABOLIC PNL TOTAL CA: CPT | Performed by: PHYSICIAN ASSISTANT

## 2024-06-03 PROCEDURE — 250N000013 HC RX MED GY IP 250 OP 250 PS 637: Performed by: SURGERY

## 2024-06-03 RX ORDER — FUROSEMIDE 20 MG
20 TABLET ORAL DAILY
Qty: 3 TABLET | Refills: 0 | Status: SHIPPED | OUTPATIENT
Start: 2024-06-03 | End: 2024-06-12

## 2024-06-03 RX ORDER — POTASSIUM CHLORIDE 750 MG/1
10 TABLET, EXTENDED RELEASE ORAL DAILY
Qty: 3 TABLET | Refills: 0 | Status: SHIPPED | OUTPATIENT
Start: 2024-06-03 | End: 2024-06-06

## 2024-06-03 RX ORDER — POLYETHYLENE GLYCOL 3350 17 G/17G
17 POWDER, FOR SOLUTION ORAL 2 TIMES DAILY
Status: DISCONTINUED | OUTPATIENT
Start: 2024-06-03 | End: 2024-06-03 | Stop reason: HOSPADM

## 2024-06-03 RX ORDER — METOPROLOL TARTRATE 25 MG/1
25 TABLET, FILM COATED ORAL 2 TIMES DAILY
Status: DISCONTINUED | OUTPATIENT
Start: 2024-06-03 | End: 2024-06-03 | Stop reason: HOSPADM

## 2024-06-03 RX ORDER — POLYETHYLENE GLYCOL 3350 17 G/17G
17 POWDER, FOR SOLUTION ORAL 2 TIMES DAILY PRN
Qty: 510 G | Refills: 0 | Status: SHIPPED | OUTPATIENT
Start: 2024-06-03 | End: 2024-06-12

## 2024-06-03 RX ORDER — MAGNESIUM OXIDE 400 MG/1
400 TABLET ORAL EVERY 4 HOURS
Status: COMPLETED | OUTPATIENT
Start: 2024-06-03 | End: 2024-06-03

## 2024-06-03 RX ORDER — METOPROLOL SUCCINATE 25 MG/1
37.5 TABLET, EXTENDED RELEASE ORAL DAILY
Qty: 135 TABLET | Refills: 0 | Status: SHIPPED | OUTPATIENT
Start: 2024-06-03 | End: 2024-06-12

## 2024-06-03 RX ORDER — POTASSIUM CHLORIDE 1500 MG/1
40 TABLET, EXTENDED RELEASE ORAL ONCE
Status: COMPLETED | OUTPATIENT
Start: 2024-06-03 | End: 2024-06-03

## 2024-06-03 RX ORDER — AMOXICILLIN 250 MG
1 CAPSULE ORAL 2 TIMES DAILY PRN
Qty: 30 TABLET | Refills: 0 | Status: SHIPPED | OUTPATIENT
Start: 2024-06-03 | End: 2024-06-12

## 2024-06-03 RX ORDER — METHOCARBAMOL 750 MG/1
750 TABLET, FILM COATED ORAL EVERY 6 HOURS PRN
Qty: 90 TABLET | Refills: 0 | Status: SHIPPED | OUTPATIENT
Start: 2024-06-03 | End: 2024-06-12

## 2024-06-03 RX ORDER — ACETAMINOPHEN 500 MG
1000 TABLET ORAL EVERY 8 HOURS PRN
COMMUNITY
Start: 2024-06-03

## 2024-06-03 RX ORDER — OXYCODONE HYDROCHLORIDE 5 MG/1
5 TABLET ORAL EVERY 6 HOURS PRN
Qty: 10 TABLET | Refills: 0 | Status: SHIPPED | OUTPATIENT
Start: 2024-06-03 | End: 2024-06-12

## 2024-06-03 RX ADMIN — PANTOPRAZOLE SODIUM 40 MG: 40 TABLET, DELAYED RELEASE ORAL at 08:07

## 2024-06-03 RX ADMIN — DORZOLAMIDE HYDROCHLORIDE AND TIMOLOL MALEATE 1 DROP: 20; 5 SOLUTION/ DROPS OPHTHALMIC at 08:12

## 2024-06-03 RX ADMIN — Medication 400 MG: at 11:36

## 2024-06-03 RX ADMIN — ACETAMINOPHEN 975 MG: 325 TABLET, FILM COATED ORAL at 08:05

## 2024-06-03 RX ADMIN — FUROSEMIDE 20 MG: 10 INJECTION, SOLUTION INTRAMUSCULAR; INTRAVENOUS at 09:29

## 2024-06-03 RX ADMIN — POTASSIUM CHLORIDE 40 MEQ: 1500 TABLET, EXTENDED RELEASE ORAL at 08:06

## 2024-06-03 RX ADMIN — GABAPENTIN 100 MG: 100 CAPSULE ORAL at 08:07

## 2024-06-03 RX ADMIN — METOPROLOL TARTRATE 12.5 MG: 25 TABLET, FILM COATED ORAL at 08:06

## 2024-06-03 RX ADMIN — POTASSIUM & SODIUM PHOSPHATES POWDER PACK 280-160-250 MG 2 PACKET: 280-160-250 PACK at 13:30

## 2024-06-03 RX ADMIN — SENNOSIDES AND DOCUSATE SODIUM 1 TABLET: 50; 8.6 TABLET ORAL at 08:08

## 2024-06-03 RX ADMIN — HEPARIN SODIUM 5000 UNITS: 5000 INJECTION, SOLUTION INTRAVENOUS; SUBCUTANEOUS at 11:34

## 2024-06-03 RX ADMIN — POLYETHYLENE GLYCOL 3350 17 G: 17 POWDER, FOR SOLUTION ORAL at 08:05

## 2024-06-03 RX ADMIN — ASPIRIN 81 MG CHEWABLE TABLET 81 MG: 81 TABLET CHEWABLE at 08:07

## 2024-06-03 RX ADMIN — HEPARIN SODIUM 5000 UNITS: 5000 INJECTION, SOLUTION INTRAVENOUS; SUBCUTANEOUS at 04:38

## 2024-06-03 RX ADMIN — POTASSIUM & SODIUM PHOSPHATES POWDER PACK 280-160-250 MG 2 PACKET: 280-160-250 PACK at 09:28

## 2024-06-03 RX ADMIN — Medication 400 MG: at 08:07

## 2024-06-03 ASSESSMENT — ACTIVITIES OF DAILY LIVING (ADL)
ADLS_ACUITY_SCORE: 26
ADLS_ACUITY_SCORE: 30
ADLS_ACUITY_SCORE: 26
ADLS_ACUITY_SCORE: 26
DEPENDENT_IADLS:: INDEPENDENT
ADLS_ACUITY_SCORE: 26
ADLS_ACUITY_SCORE: 30

## 2024-06-03 NOTE — PLAN OF CARE
Goal Outcome Evaluation.    Pt. In room air with clear lungs sounds, easy WOB, CV: ST with HR known to reach 140s with PT/exercise and return down to baseline after, pt with no dizziness or SOB reported during this time, taking good PO intake, 2 bowel movements, AVS printed and all points discussed, discharge appointments and medication schedule highlighted, pt's wife presents and heard discharge instructions as well, pt. Transported in wheel chair by transport aid to door 6 where he will be driven home by his wife upon discharge, [facility] and verified and reviewed - receiving slip placed in chart..

## 2024-06-03 NOTE — CONSULTS
Care Management Initial Consult    General Information  Assessment completed with: Patient, Spouse or significant other,    Type of CM/SW Visit: Offer D/C Planning    Primary Care Provider verified and updated as needed: Yes   Readmission within the last 30 days: no previous admission in last 30 days      Reason for Consult: discharge planning  Advance Care Planning: Advance Care Planning Reviewed: no concerns identified          Communication Assessment  Patient's communication style: spoken language (English or Bilingual)    Hearing Difficulty or Deaf: no   Wear Glasses or Blind: no    Cognitive  Cognitive/Neuro/Behavioral: WDL, unchanged from my previous assessment  Level of Consciousness: alert  Arousal Level: opens eyes spontaneously  Orientation: oriented x 4  Mood/Behavior: calm, cooperative  Best Language: 0 - No aphasia  Speech: clear, spontaneous, logical    Living Environment:   People in home: spouse  Evelyne  Current living Arrangements: house      Able to return to prior arrangements: yes       Family/Social Support:  Care provided by: self, spouse/significant other  Provides care for: no one  Marital Status:   Wife  Evelyne       Description of Support System: Involved    Support Assessment: Adequate family and caregiver support    Current Resources:   Patient receiving home care services: No     Community Resources: None  Equipment currently used at home: none  Supplies currently used at home: None    Employment/Financial:  Employment Status: employed full-time     Employment/ Comments: Professor [employer]  Financial Concerns: none           Does the patient's insurance plan have a 3 day qualifying hospital stay waiver?  No    Lifestyle & Psychosocial Needs:  Social Determinants of Health     Food Insecurity: Not At Risk (2/27/2024)    Received from [facility]    Food Insecurity     Does your food run out before you have the money to buy more?: No    Depression: Not at risk (4/10/2024)    PHQ-2     PHQ-2 Score: 0   Housing Stability: Not on file   Tobacco Use: Low Risk  (5/31/2024)    Patient History     Smoking Tobacco Use: Never     Smokeless Tobacco Use: Never     Passive Exposure: Not on file   Financial Resource Strain: Not At Risk (2/27/2024)    Received from [facility]    Financial Resource Strain     Is it hard for you to pay for the very basics like food, housing, medical care or heating?: No   Alcohol Use: Not on file   Transportation Needs: Not At Risk (2/27/2024)    Received from [facility]    Transportation Needs     Does a lack of transportation keep you from your medical appointments or from getting your medications?: No   Physical Activity: Not on file   Interpersonal Safety: Not on file   Stress: Not on file   Social Connections: Not on file   Health Literacy: Not on file       Functional Status:  Prior to admission patient needed assistance:   Dependent ADLs:: Independent  Dependent IADLs:: Independent       Mental Health Status:  Mental Health Status: No Current Concerns       Chemical Dependency Status:  Chemical Dependency Status: No Current Concerns             Values/Beliefs:  Spiritual, Cultural Beliefs, Anabaptist Practices, Values that affect care: no               Additional Information:  Care Coordinator met with patient and his Spouse Evelyne.  Introduced self and care management role.  In discussion with Evelyne, while patient was in the rest room, no discharge needs have been identified.  Evelyne will be able to be home with her spouse to help as he recuperates.  They prefer to schedule the PCP follow up appointment.    Minna Estrada, RN  Inpatient Care Management  [phone]

## 2024-06-03 NOTE — DISCHARGE SUMMARY
[facility]  Cardiothoracic Surgery Hospital Discharge Summary     Robson Gutierrez MRN# [id number]   Age: 58 year old YOB: 1965     Admitting Physician:  Ricardo Dunn MD  Discharge Physician:  Mamie Tran NP  Primary Care Physician:        Destinee Ref-Primary, Physician     DATE OF ADMISSION: 5/31/2024      DATE OF DISCHARGE: Joyce 3, 2024     Admit Wt: 171 lbs  Discharge Wt: 177 lbs          Primary Diagnoses:   Ascending aortic aneurysm s/p ascending aorta replacement          Secondary Diagnoses:   Acute postoperative pain, improving  Stress induced hyperglycemia, resolved  Stress induced leukocytosis, resolved  Acute blood loss anemia, stable  Acute blood loss thrombocytopenia, resolved  Hypervolemia, improving    PROCEDURES PERFORMED:   Date: 5/31/24.  Surgeon: Dr. Candis Torres  Ascending aorta replacement with 30 mm Hemashield interposition graft, intraoperative MAYO     INTRAOPERATIVE FINDINGS:    The patient had an overall normal LV systolic size and function.  His aortic valve was bicuspid, Luis type I, with fusion of the right and left coronary cusps.  He had a complete raphae that was mildly calcified but otherwise the valve appeared to open well and there was only trivial insufficiency.  His aortic root anatomy was also normal.     PATHOLOGY RESULTS:    Pending     CULTURE RESULTS:    none    CONSULTS:    PT/OT  Intensivist    BRIEF HISTORY OF ILLNESS:  Mr. 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  His aortic valve, however, had no significant disease.  His aortic root was also normal.  He was taken to the operating for an ascending aorta replacement.     HOSPITAL COURSE: Robson Gutierrez is a 58 year old male who on 5/31/2024 underwent the above-named procedures and tolerated the operation well.     Postoperatively was admitted to the ICU.  Patient was extubated within protocol on POD #0.   Blood pressure and cardiac index were managed with vasopressors and inotropic agents which were continuously weaned until no longer needed.  Patient was subsequently  transferred to the surgical telemetry floor.    While on the surgical unit, the patient continued to progress well. Chest tubes and temporary pacemaker wires were removed when deemed appropriate.     Patient was fluid overloaded and treated with diuretics. They remain up about 6 lbs from preoperative weight and will discharge with 3 days of diuretic therapy; will re-evaluate need in clinic follow-up.      Patient was transiently hyperglycemic and treated with insulin infusion then transitioned to sliding scale insulin per protocol. Blood sugars remained stable. No further glycemic control agents needed at this time.     Prior to discharge, his pain was controlled well, he was working well with therapies, able to perform most ADLs, ambulate without difficulty, and had full return of bowel and bladder function.  On Joyce 3, 2024, he was discharged to home with the help of family in stable condition. Follow up with cardiology and cardiac surgery have been arranged. Pt encouraged to follow up with PCP and cardiac rehab upon discharge.    Patient discharged on aspirin:  Yes 81 mg  Patient discharged on beta blocker: yes    Patient discharged on ACE Inhibitor/ARB: no      Patient discharged on statin: no - not indicated         Discharge Disposition:     Discharged to home            Condition on Discharge:     Discharge condition: Good   Discharge vitals: Blood pressure (!) 126/92, pulse 114, temperature 98.1  F (36.7  C), temperature source Oral, resp. rate 16, height 1.829 m (6'), weight 80.4 kg (177 lb 4 oz), SpO2 97%.   Code status on discharge: Full Code     Vitals:    06/01/24 0400 06/02/24 0430 06/03/24 0446   Weight: 80.4 kg (177 lb 4 oz) 82 kg (180 lb 12.4 oz) 80.4 kg (177 lb 4 oz)       LABS  Most Recent 3 CBC's:  Recent Labs   Lab Test  24  0545 24  1138   WBC 6.9 10.4 9.1   HGB 8.0* 9.6* 11.4*   MCV 90 89 88   * 153 133*      Most Recent 3 BMP's:  Recent Labs   Lab Test 24  0806 24  0545 24  2201 24  0834 24  0427 24  0613 246 24  1257 24  1138   NA  --  140  --   --   --   --  136  --  142   POTASSIUM  --  3.4  --   --  3.5  --  3.7  --  3.7   CHLORIDE  --  104  --   --   --   --  103  --  111*   CO2  --  27  --   --   --   --  26  --  23   BUN  --  9.8  --   --   --   --  15.3  --  13.6   CR  --  0.72  --   --   --   --  0.76  --  0.79   ANIONGAP  --  9  --   --   --   --  7  --  8   HALEY  --  8.3*  --   --   --   --  7.8*  --  8.1*   * 114* 131*   < >  --    < > 133*   < > 154*  163*    < > = values in this interval not displayed.     Most Recent 2 LFT's:  Recent Labs   Lab Test 24  1138   AST 27 28   ALT 12 11   ALKPHOS 47 52   BILITOTAL 0.6 0.9     Most Recent INR's and Anticoagulation Dosing History:  Anticoagulation Dose History          Latest Ref Rng & Units 2018   Recent Dosing and Labs   INR 0.85 - 1.15 1.06  1.07  1.46  1.42  1.08      Most Recent 3 Troponin's:  Recent Labs   Lab Test 19  1109   TROPONIN 0.00     Most Recent Cholesterol Panel:  Recent Labs   Lab Test 22  0841 19  0816   CHOL  --  162   LDL  --  76   HDL  --  58   TRIG 166* 138     Most Recent 6 Bacteria Isolates From Any Culture (See EPIC Reports for Culture Details):No lab results found.  Most Recent TSH, T4 and A1c Labs:  Recent Labs   Lab Test 24  0905   A1C 5.5      Recent Labs   Lab 24  0806 24  0545 24  2201 24  1706 24  1303 24  0834   * 114* 131* 119* 105* 107*       Imagin/3/24 CXR:  IMPRESSION: Interval removal of the right central venous catheter and  chest tubes. Small left and trace right pleural effusions with  adjacent atelectasis. No  convincing pneumothorax. Median sternotomy.  Unremarkable heart size.      PRE-ADMISSION MEDICATIONS:  Medications Prior to Admission   Medication Sig Dispense Refill Last Dose    aspirin 81 MG EC tablet Take 324 mg by mouth once    at am    dorzolamide-timolol (COSOPT) 22.3-6.8 MG/ML ophthalmic solution Place 1 drop into both eyes 2 times daily    at am    latanoprost (XALATAN) 0.005 % ophthalmic solution Place 1 drop into both eyes every evening   5/31/2024 at pm    [DISCONTINUED] atenolol (TENORMIN) 50 MG tablet Take 1 tablet (50 mg) by mouth daily 90 tablet 2 5/31/2024 at am    [DISCONTINUED] chlorthalidone (HYGROTON) 25 MG tablet Take 1 tablet by mouth daily    at am       DISCHARGE MEDICATIONS:   Current Discharge Medication List        START taking these medications    Details   acetaminophen (TYLENOL) 500 MG tablet Take 2 tablets (1,000 mg) by mouth every 8 hours as needed for other (For optimal non-opioid multimodal pain management to improve pain control.)    Associated Diagnoses: Status post ascending aortic aneurysm repair      furosemide (LASIX) 20 MG tablet Take 1 tablet (20 mg) by mouth daily for 3 days  Qty: 3 tablet, Refills: 0    Associated Diagnoses: Status post ascending aortic aneurysm repair      methocarbamol (ROBAXIN) 750 MG tablet Take 1 tablet (750 mg) by mouth every 6 hours as needed for muscle spasms or other (pain)  Qty: 90 tablet, Refills: 0    Associated Diagnoses: Status post ascending aortic aneurysm repair      metoprolol succinate ER (TOPROL XL) 25 MG 24 hr tablet Take 1.5 tablets (37.5 mg) by mouth daily for 90 days  Qty: 135 tablet, Refills: 0    Associated Diagnoses: Status post ascending aortic aneurysm repair      oxyCODONE (ROXICODONE) 5 MG tablet Take 1 tablet (5 mg) by mouth every 6 hours as needed for moderate pain  Qty: 10 tablet, Refills: 0    Associated Diagnoses: Status post ascending aortic aneurysm repair      polyethylene glycol (MIRALAX) 17 GM/Dose powder Take 17 g  by mouth 2 times daily as needed for constipation  Qty: 510 g, Refills: 0    Associated Diagnoses: Status post ascending aortic aneurysm repair      potassium chloride wilder ER (KLOR-CON M10) 10 MEQ CR tablet Take 1 tablet (10 mEq) by mouth daily for 3 days  Qty: 3 tablet, Refills: 0    Associated Diagnoses: Status post ascending aortic aneurysm repair      senna-docusate (SENOKOT-S/PERICOLACE) 8.6-50 MG tablet Take 1 tablet by mouth 2 times daily as needed for constipation  Qty: 30 tablet, Refills: 0    Associated Diagnoses: Status post ascending aortic aneurysm repair           CONTINUE these medications which have NOT CHANGED    Details   aspirin 81 MG EC tablet Take 324 mg by mouth once      dorzolamide-timolol (COSOPT) 22.3-6.8 MG/ML ophthalmic solution Place 1 drop into both eyes 2 times daily      latanoprost (XALATAN) 0.005 % ophthalmic solution Place 1 drop into both eyes every evening           STOP taking these medications       atenolol (TENORMIN) 50 MG tablet Comments:   Reason for Stopping:         chlorthalidone (HYGROTON) 25 MG tablet Comments:   Reason for Stopping:                CC:s  No Ref-Primary, Physician      [facility]   Cardiothoracic Surgery  Office phone: [phone]  Office fax: [phone]

## 2024-06-03 NOTE — DISCHARGE INSTRUCTIONS
Ok to start driving four weeks after the date of surgery as long as you are no longer taking narcotic pain medications.    From the date of surgery: no lifting greater than 10 pounds for 8 weeks, then no lifting greater than 20 pounds for an additional 4 weeks.    Do not soak your incisions until fully healed over with no scabbing; this includes hot tubs, baths, pools etc.    If you have questions or concerns, please call us:    Josephine Leyva RN Care Coordinator  [phone]

## 2024-06-03 NOTE — PROGRESS NOTES
[facility]  Cardiovascular and Thoracic Surgery Daily Note      Assessment and Plan  POD #3 s/p repair of ascending aortic aneurysm on 5/31/2024 with Dr. Torres    - CVS: Pre-op TTE with EF 60-65%  No regional WMA  Trace mitral regurg  Trace tricuspid regurg  Carotid US (4/16/24) - less than 50% stenosis of bilateral ICA    HD stable overnight in NSR/ST. Increase lopressor to 25 mg PO bid with parameters, continue lasix 20 mg IV daily, aspirin 81 mg, no hypercholesterolemia, CT and TPWs removed 6/2    - Resp: Extubated POD #0 at 1359. Saturating well on RA. Working with IS, pulmonary toilet.    - Neuro: Neuros intact, pain controlled on current regimen    - Renal: No history of significant renal disease. Trend BMP. Diuretic as above.  Recent Labs   Lab 06/03/24  0545 06/01/24  0446 05/31/24  1138   CR 0.72 0.76 0.79       - GI: No BM, +flatus, increase bowel regimen    - : voiding well on own    - Endo: sliding scale insulin discontinued given euglycemia   Hemoglobin A1C   Date Value Ref Range Status   04/16/2024 5.5 <5.7 % Final     Comment:     Normal <5.7%   Prediabetes 5.7-6.4%    Diabetes 6.5% or higher     Note: Adopted from ADA consensus guidelines.        - FEN: Replace electrolytes as needed. Tolerating regular diet    - ID: Afebrile. Finished periop abx prophylaxis. Trend CBC and fever curve.   Recent Labs   Lab 06/03/24  0545 06/01/24  0446 05/31/24  1138   WBC 6.9 10.4 9.1       - Heme: Acute blood loss anemia adue to surgery. Trend CBC, transfuse PRN.   Recent Labs   Lab 06/03/24  0545 06/01/24  0446 05/31/24  1138   HGB 8.0* 9.6* 11.4*   * 153 133*       - Proph: SCD, subcutaneous heparin, PPI    - Other:            # Hypoalbuminemia: Lowest albumin = 3.1 g/dL at 6/1/2024  4:46 AM, will monitor as appropriate     # Thrombocytopenia: Lowest platelets = 108 in last 2 days, will monitor for bleeding   # Hypertension: Noted on problem list                   - Dispo: Continue on  station 33, continue therapies, mobility, pulm hygiene. Therapies recommending discharge to home with OP CR, today vs tomorrow pending BM.    Interval History  No acute events overnight. States pain is well managed on current regimen, slept well. Breathing is stable on room air, working with IS. Tolerating diet, is passing flatus, no BM, no n/v. Denies chest pain, palpitations, dizziness, syncopal symptoms, chills, myalgias, sternal popping/clicking.      Medications  Current Facility-Administered Medications   Medication Dose Route Frequency Provider Last Rate Last Admin    acetaminophen (TYLENOL) tablet 975 mg  975 mg Oral Q8H Zurdo Guerrero PA-C   975 mg at 06/03/24 0805    aspirin (ASA) chewable tablet 81 mg  81 mg Oral or NG Tube Daily Zurdo Guerrero PA-C   81 mg at 06/03/24 0807    dorzolamide-timolol (COSOPT) ophthalmic solution 1 drop  1 drop Both Eyes BID Zurdo Guerrero PA-C   1 drop at 06/03/24 0812    furosemide (LASIX) injection 20 mg  20 mg Intravenous Daily Zurdo Guerrero PA-C   20 mg at 06/02/24 0836    heparin ANTICOAGULANT injection 5,000 Units  5,000 Units Subcutaneous Q8H Zurdo Guerrero PA-C   5,000 Units at 06/03/24 0438    latanoprost (XALATAN) 0.005 % ophthalmic solution 1 drop  1 drop Both Eyes At Bedtime Zurdo Guerrero PA-C   1 drop at 06/02/24 2101    magnesium oxide (MAG-OX) tablet 400 mg  400 mg Oral Q4H Candis Torres MD   400 mg at 06/03/24 0807    metoprolol tartrate (LOPRESSOR) tablet 25 mg  25 mg Oral BID Mamie Tran NP        pantoprazole (PROTONIX) EC tablet 40 mg  40 mg Oral Daily Zurdo Guerrero PA-C   40 mg at 06/03/24 0807    polyethylene glycol (MIRALAX) Packet 17 g  17 g Oral BID Mamie Tran NP        senna-docusate (SENOKOT-S/PERICOLACE) 8.6-50 MG per tablet 1 tablet  1 tablet Oral BID Zurdo Guerrero PA-C   1 tablet at 06/03/24 0808    sodium chloride (PF) 0.9% PF flush 3 mL  3 mL Intracatheter Q8H Zurdo Guerrero PA-C   3 mL at 06/03/24 0441     sodium phosphate 9 mmol in 250 mL NS intermittent infusion  9 mmol Intravenous Q2H Candis Torres MD         Current Facility-Administered Medications   Medication Dose Route Frequency Provider Last Rate Last Admin    acetaminophen (TYLENOL) tablet 650 mg  650 mg Oral Q4H PRN Zurdo Guerrero PA-C        bisacodyl (DULCOLAX) suppository 10 mg  10 mg Rectal Daily PRN Zurdo Guerrero PA-C        calcium carbonate (TUMS) chewable tablet 500 mg  500 mg Oral 4x Daily PRN Zurdo Guerrero PA-C        glucose gel 15-30 g  15-30 g Oral Q15 Min PRN Zurdo Guerrero PA-C        Or    dextrose 50 % injection 25-50 mL  25-50 mL Intravenous Q15 Min PRN Zurdo Guerrero PA-C        Or    glucagon injection 1 mg  1 mg Subcutaneous Q15 Min PRN Zurdo Guerrero PA-C        hydrALAZINE (APRESOLINE) injection 10 mg  10 mg Intravenous Q30 Min PRN Zurdo Guerrero PA-C        HYDROmorphone (DILAUDID) injection 0.2 mg  0.2 mg Intravenous Q2H PRN Zurdo Guerrero PA-C        Or    HYDROmorphone (DILAUDID) injection 0.4 mg  0.4 mg Intravenous Q2H PRN Zurdo Guerrero PA-C        lidocaine (LMX4) cream   Topical Q1H PRN Zurdo Guerrero PA-C        lidocaine 1 % 0.1-1 mL  0.1-1 mL Other Q1H PRN Zurdo Guerrero PA-C        magnesium hydroxide (MILK OF MAGNESIA) suspension 30 mL  30 mL Oral Daily PRN Zurdo Guerrero PA-C        methocarbamol (ROBAXIN) tablet 750 mg  750 mg Oral Q6H PRN Zurdo Guerrero PA-C   750 mg at 06/01/24 2015    naloxone (NARCAN) injection 0.2 mg  0.2 mg Intravenous Q2 Min PRN Zurdo Guerrero PA-C        Or    naloxone (NARCAN) injection 0.4 mg  0.4 mg Intravenous Q2 Min PRN Zurdo Guerrero PA-C        Or    naloxone (NARCAN) injection 0.2 mg  0.2 mg Intramuscular Q2 Min PRN Zurdo Guerrero PA-C        Or    naloxone (NARCAN) injection 0.4 mg  0.4 mg Intramuscular Q2 Min PRN Zurdo Guerrero PA-C        ondansetron (ZOFRAN ODT) ODT tab 4 mg  4 mg Oral Q6H PRN Zurdo Guerrero PA-C        Or     ondansetron (ZOFRAN) injection 4 mg  4 mg Intravenous Q6H PRN Zurdo Guerrero PA-C   4 mg at 06/01/24 0051    oxyCODONE (ROXICODONE) tablet 5 mg  5 mg Oral Q4H PRN Zurdo Guerrero PA-C   5 mg at 06/01/24 2149    Or    oxyCODONE (ROXICODONE) tablet 10 mg  10 mg Oral Q4H PRN Zurdo Guerrero PA-C        prochlorperazine (COMPAZINE) injection 10 mg  10 mg Intravenous Q6H PRN Zurdo Guerrero PA-C        Or    prochlorperazine (COMPAZINE) tablet 10 mg  10 mg Oral Q6H PRN Zurdo Guerrero PA-C        sodium chloride (PF) 0.9% PF flush 3 mL  3 mL Intracatheter q1 min prn Zurdo Guerrero PA-C             Physical Exam  Vitals were reviewed  Blood pressure 104/75, pulse 105, temperature 98.1  F (36.7  C), temperature source Oral, resp. rate 16, height 1.829 m (6'), weight 80.4 kg (177 lb 4 oz), SpO2 92%.  Gen: Up in chair, NAD    Lungs: CTA, dim bases, room air    Cardiovascular: S1, S1, RRR, no m/r/g, NSR/ST    Abdomen: BS+, NT/ND, soft    Derm: sternal incision CDI    CT: N/A      Weight:   Vitals:    05/31/24 0521 05/31/24 1200 06/01/24 0400 06/02/24 0430   Weight: 77.6 kg (171 lb) 78.9 kg (173 lb 15.1 oz) 80.4 kg (177 lb 4 oz) 82 kg (180 lb 12.4 oz)    06/03/24 0446   Weight: 80.4 kg (177 lb 4 oz)         Data  Recent Labs   Lab 06/03/24  0806 06/03/24  0545 06/02/24  2201 06/02/24  0834 06/02/24  0427 06/01/24  0613 06/01/24  0446 05/31/24  1257 05/31/24  1138 05/31/24  0950 05/31/24  0942 05/31/24  0751 05/31/24  0549   WBC  --  6.9  --   --   --   --  10.4  --  9.1  --  7.3  --  5.2   HGB  --  8.0*  --   --   --   --  9.6*  --  11.4*   < > 10.4*   < > 13.6   MCV  --  90  --   --   --   --  89  --  88  --  88  --  88   PLT  --  108*  --   --   --   --  153  --  133*  --  135*  --  194   INR  --   --   --   --   --   --   --   --  1.46*  --  1.42*  --  1.08   NA  --  140  --   --   --   --  136  --  142   < > 139   < > 140   POTASSIUM  --  3.4  --   --  3.5  --  3.7  --  3.7   < > 4.2   < > 3.5   CHLORIDE  --  104   --   --   --   --  103  --  111*  --  108*  --  103   CO2  --  27  --   --   --   --  26  --  23  --  24  --  27   BUN  --  9.8  --   --   --   --  15.3  --  13.6  --  14.5  --  18.1   CR  --  0.72  --   --   --   --  0.76  --  0.79  --  0.75  --  0.98   ANIONGAP  --  9  --   --   --   --  7  --  8  --  7  --  10   HALEY  --  8.3*  --   --   --   --  7.8*  --  8.1*  --  9.2  --  9.1   * 114* 131*   < >  --    < > 133*   < > 154*  163*   < > 120*   < > 103*   ALBUMIN  --   --   --   --   --   --  3.1*  --  3.2*  --   --   --  3.8   PROTTOTAL  --   --   --   --   --   --  5.1*  --  5.0*  --   --   --  7.2   BILITOTAL  --   --   --   --   --   --  0.6  --  0.9  --   --   --  0.4   ALKPHOS  --   --   --   --   --   --  47  --  52  --   --   --  93   ALT  --   --   --   --   --   --  12  --  11  --   --   --  16   AST  --   --   --   --   --   --  27  --  28  --   --   --  19    < > = values in this interval not displayed.       Imaging:  Recent Results (from the past 24 hour(s))   XR Chest 2 Views    Narrative    CHEST TWO VIEWS Joyce 3, 2024 7:41 AM     HISTORY: Status post repair of ascending aortic aneurysm, removal of  chest tubes.    COMPARISON: Chest radiograph 6/1/2024.       Impression    IMPRESSION: Interval removal of the right central venous catheter and  chest tubes. Small left and trace right pleural effusions with  adjacent atelectasis. No convincing pneumothorax. Median sternotomy.  Unremarkable heart size.         Patient seen and discussed with Dr Melissa Tran, APRN, [facility], CCRN  Nurse Practitioner  Cardiothoracic Surgery  Pager: [phone]

## 2024-06-03 NOTE — PLAN OF CARE
Goal Outcome Evaluation:    Shift:   Procedures: POD#3 s/p ascending aortic aneurysm repair (done 24).  Orientation/Cognitive: A&Ox4  VS/O2: VSS on RA, except tachycardic at times. Denied SOB, BERRY, or CP.  LS: Clear, dim in bases. Using IS independently, up to 1000 mL, tolerating well.  Neuro/CMS: Intact, except trace edema to bilateral ankles and feet.  Pain: Sternal/incisional pain managed with scheduled tylenol and gabapentin.  Mobility: SBA with walker. Denied dizziness or lightheadedness.   Diet: Mod Carb/cardiac/low sat fat Na<2400mg, tolerating. Denied N/V. Good PO intake.  Bowel & Bladder: Continent. AUO. Using urinal at bedside overnight.   -BM (last BM ), +BS normoactive, +flatus.   Skin/Wounds: Intact.   R wrist incisional site CDI; sternal incision HERNAN approximated with liquid bandage, Gauze bandage over CT sites CDI.   Mepi to coccyx for prevention.  Labs: Abnormal/Trends, Electrolyte Replacement -- Phos replaced yesterday/overnight. Phos this AM 1.8. K 3.4. Mg 2.0. K, Mg, and Phos replacements ordered. All rechecks ordered, modify if needed.   Hgb trending down, 8.0.  Platelets trending down, 108.  B & 114.  Tele: ST (112 bpm) with RBBB.  IV Access/Drips/Fluids: PIVx1 SL.  Drains: None. CTs removed . Morrell out .  Tests/Imagin view chest XR this AM  Plan: Continue current plan of care. Home at discharge, possibly Tuesday pending progress.

## 2024-06-04 ENCOUNTER — PATIENT OUTREACH (OUTPATIENT)
Dept: CARE COORDINATION | Facility: CLINIC | Age: 59
End: 2024-06-04
Payer: COMMERCIAL

## 2024-06-04 LAB
PATH REPORT.COMMENTS IMP SPEC: NORMAL
PATH REPORT.COMMENTS IMP SPEC: NORMAL
PATH REPORT.FINAL DX SPEC: NORMAL
PATH REPORT.GROSS SPEC: NORMAL
PATH REPORT.MICROSCOPIC SPEC OTHER STN: NORMAL
PATH REPORT.RELEVANT HX SPEC: NORMAL
PHOTO IMAGE: NORMAL

## 2024-06-04 NOTE — PROGRESS NOTES
"Clinic Care Coordination Contact  Transitions of Care Outreach  Chief Complaint   Patient presents with    Clinic Care Coordination - Post Hospital       Most Recent Admission Date: 5/31/2024   Most Recent Admission Diagnosis: Ascending aortic aneurysm (H24) - I71.21     Most Recent Discharge Date: 6/3/2024   Most Recent Discharge Diagnosis: Status post ascending aortic aneurysm repair - Z98.890, Z86.79     Transitions of Care Assessment    Discharge Assessment  How are you doing now that you are home?: \"Good I'm good. We have my medications and doing well breathing well and walking.\"  How are your symptoms? (Red Flag symptoms escalate to triage hotline per guidelines): Improved  Do you know how to contact your clinic care team if you have future questions or changes to your health status? : Yes  Does the patient have their discharge instructions? : Yes  Does the patient have questions regarding their discharge instructions? : No  Were you started on any new medications or were there changes to any of your previous medications? : Yes  Does the patient have all of their medications?: Yes  Do you have questions regarding any of your medications? : No  Do you have all of your needed medical supplies or equipment (DME)?  (i.e. oxygen tank, CPAP, cane, etc.): Yes    Post-op ([facility] CTA Only)  If the patient had a surgery or procedure, do they have any questions for a nurse?: No    Follow up Plan     Discharge Follow-Up  Discharge follow up appointment scheduled in alignment with recommended follow up timeframe or Transitions of Risk Category? (Low = within 30 days; Moderate= within 14 days; High= within 7 days): Yes  Discharge Follow Up Appointment Date: 06/19/24  Discharge Follow Up Appointment Scheduled with?: Specialty Care Provider (Cardiology appt.)    Future Appointments   Date Time Provider Department Center   6/19/2024  1:15 PM P CARDIOTHORACIC SURGERY, [facility]   8/12/2024  2:00 PM Mandy" Colleen, APRN CNP [facility]       Outpatient Plan as outlined on AVS reviewed with patient.    For any urgent concerns, please contact our 24 hour nurse triage line: [phone] ([phone])       TERRY Gould  [phone]  [facility]

## 2024-06-06 ENCOUNTER — TELEPHONE (OUTPATIENT)
Dept: OTHER | Facility: CLINIC | Age: 59
End: 2024-06-06
Payer: COMMERCIAL

## 2024-06-06 NOTE — TELEPHONE ENCOUNTER
I discussed HR and BP with Mer Jones PA-C. Agree with increasing Metoprolol XL to 50 mg daily. Will monitor over next few days with last dose of Lasix being today. Cardiac rehab tomorrow. Offered pt an EKG but states he feels fine with HR and can wait until Cardiac rehab tomorrow. Instructed if becomes symptomatic with SOB at rest,chest pain or dizziness he needs to present to the ED. Will follow up on 6/10.

## 2024-06-06 NOTE — TELEPHONE ENCOUNTER
48 hour post op call    ACTIVITY    How are you doing with activity? I am doing ell except shortness of breath with stairs and fatigue    Are you continuing to use your IS 3-4 times a day and do you have any shortness of breath. IS 1750 ml. Improving rapidly.     Are you weighing yourself daily and has it been stable?. Down 7 lbs. Baseline wt of 170 this am. Took last does of Lasix.     PAIN  How is your pain, what are you doing for your pain? Good, Tylenol 3 X a day. Plan on reducing to 2 X  a day soon.     Are you still doing sternal precautions? Yes    Do you hear any clicking when you are moving or taking a deep breath? No      INCISION:   Looks good, no redness or drainage.     ASSISTANCE  Do you have someone at home to help you with your daily activities and transportation needs? My wife       MEDICATIONS  I would like to review your discharge medications and answer any questions you may have.   Reviewed , -125, /101 with increased dose of Metoprolol XL 50 mg this am. HR regular      Are you on a blood thinner/Coumadin  Na     Who is managing your Coumadin dosing/INR? Na       FOLLOW UP       Scheduled for cardiac rehab? 6/7    Scheduled to see our PA or Surgeon? 6/19  Scheduled to see your cardiologist ? Wait list for Dr. Arzate   Scheduled to see DONN/Miguel Macias 8/12  Scheduled to see your primary care physician? Dr. Longoria 8/12  Do you have our contact information? Yes

## 2024-06-07 ENCOUNTER — HOSPITAL ENCOUNTER (OUTPATIENT)
Dept: CARDIAC REHAB | Facility: CLINIC | Age: 59
Discharge: HOME | End: 2024-06-07
Attending: PHYSICIAN ASSISTANT
Payer: COMMERCIAL

## 2024-06-07 DIAGNOSIS — Z86.79 STATUS POST ASCENDING AORTIC ANEURYSM REPAIR: ICD-10-CM

## 2024-06-07 DIAGNOSIS — Z98.890 STATUS POST ASCENDING AORTIC ANEURYSM REPAIR: ICD-10-CM

## 2024-06-07 PROCEDURE — 93798 PHYS/QHP OP CAR RHAB W/ECG: CPT | Performed by: CLINICAL EXERCISE PHYSIOLOGIST

## 2024-06-10 ENCOUNTER — HOSPITAL ENCOUNTER (OUTPATIENT)
Dept: CARDIAC REHAB | Facility: CLINIC | Age: 59
Discharge: HOME | End: 2024-06-10
Attending: PHYSICIAN ASSISTANT
Payer: COMMERCIAL

## 2024-06-10 ENCOUNTER — TELEPHONE (OUTPATIENT)
Dept: OTHER | Facility: CLINIC | Age: 59
End: 2024-06-10
Payer: COMMERCIAL

## 2024-06-10 ENCOUNTER — DOCUMENTATION ONLY (OUTPATIENT)
Dept: OTHER | Facility: CLINIC | Age: 59
End: 2024-06-10
Payer: COMMERCIAL

## 2024-06-10 DIAGNOSIS — Z95.828 S/P ASCENDING AORTIC REPLACEMENT: Primary | ICD-10-CM

## 2024-06-10 PROCEDURE — 93798 PHYS/QHP OP CAR RHAB W/ECG: CPT | Performed by: REHABILITATION PRACTITIONER

## 2024-06-10 NOTE — PROGRESS NOTES
Cardiac rehab progress note reviewed. Rhythm looks A-flutter with tele strip. Will bring into CV surgery clinic 6/12 with EKG. Pt aware.

## 2024-06-10 NOTE — TELEPHONE ENCOUNTER
Patient states His BP is 110/90, 125/95,  -110. Regular pulse. Weight 166 lbs. Off Lasix. 50 mg Metoprolol XL daily. Pt had Cardiac rehab eval this pm. Will follow up with provider after rehab done for any recommendations.

## 2024-06-12 ENCOUNTER — HOSPITAL ENCOUNTER (OUTPATIENT)
Dept: CARDIAC REHAB | Facility: CLINIC | Age: 59
Discharge: HOME | End: 2024-06-12
Attending: PHYSICIAN ASSISTANT
Payer: COMMERCIAL

## 2024-06-12 ENCOUNTER — OFFICE VISIT (OUTPATIENT)
Dept: CARDIOLOGY | Facility: CLINIC | Age: 59
End: 2024-06-12
Payer: COMMERCIAL

## 2024-06-12 VITALS
HEIGHT: 72 IN | OXYGEN SATURATION: 97 % | SYSTOLIC BLOOD PRESSURE: 113 MMHG | DIASTOLIC BLOOD PRESSURE: 76 MMHG | HEART RATE: 116 BPM | WEIGHT: 170.4 LBS | BODY MASS INDEX: 23.08 KG/M2

## 2024-06-12 DIAGNOSIS — Z98.890 HX OF ASCENDING AORTA REPAIR: Primary | ICD-10-CM

## 2024-06-12 DIAGNOSIS — Z86.79 STATUS POST ASCENDING AORTIC ANEURYSM REPAIR: ICD-10-CM

## 2024-06-12 DIAGNOSIS — Z98.890 STATUS POST ASCENDING AORTIC ANEURYSM REPAIR: ICD-10-CM

## 2024-06-12 DIAGNOSIS — I71.21 ANEURYSM OF ASCENDING AORTA WITHOUT RUPTURE (H): ICD-10-CM

## 2024-06-12 PROCEDURE — 93798 PHYS/QHP OP CAR RHAB W/ECG: CPT | Performed by: CLINICAL EXERCISE PHYSIOLOGIST

## 2024-06-12 PROCEDURE — 99024 POSTOP FOLLOW-UP VISIT: CPT | Performed by: PHYSICIAN ASSISTANT

## 2024-06-12 RX ORDER — METOPROLOL SUCCINATE 25 MG/1
75 TABLET, EXTENDED RELEASE ORAL DAILY
COMMUNITY
Start: 2024-06-12 | End: 2024-07-08

## 2024-06-12 RX ORDER — ASPIRIN 81 MG/1
81 TABLET ORAL
COMMUNITY
Start: 2024-06-12 | End: 2024-07-08

## 2024-06-12 RX ORDER — ASPIRIN 325 MG
325 TABLET, DELAYED RELEASE (ENTERIC COATED) ORAL DAILY
COMMUNITY
End: 2024-06-12

## 2024-06-12 NOTE — PROGRESS NOTES
Pt comes to the clinic for routine follow up for repair of ascending aortic aneurysm on 5/31/24 with Dr Torres. Pt was brought to the hospital electively for surgery and discharged to home on 6/3/24. His hospital course was uncomplicated. He was discharged to home on lasix x 3 days. He has started cardiac rehab and was noted to have some poss aflutter. Due to aflutter pt's post op visit was moved up.    Pt states that pain is being controlled. He is no longer using any pain medications. Breathing is good. Working with IS and is getting it up to 2000 ml. Denies any sob with ambulation. Appetite has been good. Denies any nausea. BMs are back to normal. Denies any popping/clicking in his breast bone. Denies any fevers, chills or night sweats. Denies any lightheadedness or dizziness. He is ambulating. Has been ambulating 20 min a day. He is participating in cardiac rehab. Has been sleeping well. Sleeping through the night. Feels like things are going well. Pt is on toprol XL 50mg daily and changed to aspirin 81 mg.    Up in chair, comfortable, -O2  /76   Pulse 116   Ht 1.829 m (6')   Wt 77.3 kg (170 lb 6.4 oz)   SpO2 97%   BMI 23.11 kg/m    CV - reg rate, -edema LE  Pulm - CTA  Derm - sternal incision D/I  EKG ST 100s    A/P:  Reviewed surgical restrictions. All questions/concerns were addressed. No need for further follow up with surgery unless questions/concerns arise. Pt is to continue to work with cardiology and PCP. Pt will continue to monitor HR/BP and call the office on 6/17. If HR remains elevated may look to change pt back to atenolol. He was on atenolol 50 mg daily prior to surgery. Pt is agreeable to plan of care. Continue to monitor.

## 2024-06-12 NOTE — PATIENT INSTRUCTIONS
No lifting over 10 lbs x 8 weeks after surgery. After the 8 weeks gradually increase what you are lifting.  No driving for 4 weeks after surgery.  Participate in cardiac rehab.  No further surgical restrictions 12 weeks after surgery.  If you have any questions/concerns for your surgeon please call [phone].  Please continue monitoring heart rate/blood pressure daily and record. Call the office on 6/17/24 and we can make changes for heart rate.

## 2024-06-14 ENCOUNTER — HOSPITAL ENCOUNTER (OUTPATIENT)
Dept: CARDIAC REHAB | Facility: CLINIC | Age: 59
Discharge: HOME | End: 2024-06-14
Attending: PHYSICIAN ASSISTANT
Payer: COMMERCIAL

## 2024-06-14 PROCEDURE — 93798 PHYS/QHP OP CAR RHAB W/ECG: CPT | Performed by: REHABILITATION PRACTITIONER

## 2024-06-18 ENCOUNTER — TELEPHONE (OUTPATIENT)
Dept: OTHER | Facility: CLINIC | Age: 59
End: 2024-06-18
Payer: COMMERCIAL

## 2024-06-18 ENCOUNTER — HOSPITAL ENCOUNTER (OUTPATIENT)
Dept: CARDIAC REHAB | Facility: CLINIC | Age: 59
Discharge: HOME | End: 2024-06-18
Attending: PHYSICIAN ASSISTANT
Payer: COMMERCIAL

## 2024-06-18 PROCEDURE — 93798 PHYS/QHP OP CAR RHAB W/ECG: CPT

## 2024-06-18 NOTE — TELEPHONE ENCOUNTER
Pt called stating his HR is still 100. Asymptomatic. /85. Remains on Metoprolol. Will discuss with Zurdo Guerrero PA-C. Has Cardiac rehab today. He feels like no medication changes needed and hoping it will start to slow as he continues healing. Still getting hot flashes at night. No fever associated. Reassured this is a normal complaint and will dissipate over the next few weeks. Continues to have numbness of last 2 fingers on his left hand. Also discussed previously as a brachial nerve irritation due to retracting of ribs. Also should improve in it's own but can take many weeks. All concerns addressed.

## 2024-06-20 ENCOUNTER — HOSPITAL ENCOUNTER (OUTPATIENT)
Dept: CARDIAC REHAB | Facility: CLINIC | Age: 59
Discharge: HOME | End: 2024-06-20
Attending: PHYSICIAN ASSISTANT
Payer: COMMERCIAL

## 2024-06-20 PROCEDURE — 93798 PHYS/QHP OP CAR RHAB W/ECG: CPT

## 2024-06-24 ENCOUNTER — HOSPITAL ENCOUNTER (OUTPATIENT)
Dept: CARDIAC REHAB | Facility: CLINIC | Age: 59
Discharge: HOME | End: 2024-06-24
Attending: PHYSICIAN ASSISTANT
Payer: COMMERCIAL

## 2024-06-24 PROCEDURE — 93798 PHYS/QHP OP CAR RHAB W/ECG: CPT

## 2024-06-26 ENCOUNTER — HOSPITAL ENCOUNTER (OUTPATIENT)
Dept: CARDIAC REHAB | Facility: CLINIC | Age: 59
Discharge: HOME | End: 2024-06-26
Attending: PHYSICIAN ASSISTANT
Payer: COMMERCIAL

## 2024-06-26 PROCEDURE — 93798 PHYS/QHP OP CAR RHAB W/ECG: CPT | Performed by: CLINICAL EXERCISE PHYSIOLOGIST

## 2024-07-01 ENCOUNTER — HOSPITAL ENCOUNTER (OUTPATIENT)
Dept: CARDIAC REHAB | Facility: CLINIC | Age: 59
Discharge: HOME | End: 2024-07-01
Attending: PHYSICIAN ASSISTANT
Payer: COMMERCIAL

## 2024-07-01 ENCOUNTER — TELEPHONE (OUTPATIENT)
Dept: OTHER | Facility: CLINIC | Age: 59
End: 2024-07-01
Payer: COMMERCIAL

## 2024-07-01 DIAGNOSIS — I10 PRIMARY HYPERTENSION: Primary | ICD-10-CM

## 2024-07-01 PROCEDURE — 93798 PHYS/QHP OP CAR RHAB W/ECG: CPT | Performed by: OCCUPATIONAL THERAPIST

## 2024-07-01 RX ORDER — METOPROLOL SUCCINATE 25 MG/1
75 TABLET, EXTENDED RELEASE ORAL DAILY
Qty: 90 TABLET | Refills: 0 | Status: SHIPPED | OUTPATIENT
Start: 2024-07-01 | End: 2024-07-26

## 2024-07-01 NOTE — PROGRESS NOTES
SBP in 110s and HR consistently in low 100s. EKG obtained and stable, but with activity HR to 150s. Will increase BB as prescribed.     Thanks,  Lala Staples PA-C

## 2024-07-01 NOTE — TELEPHONE ENCOUNTER
Cardiac Rehabilitation called today stating this pt's HR continues to be elevated with rest at 100 but up to 150-160 with activity with slow recovery. Discussed with Lala Staples PA-C. Will increase to Metoprolol XL 75 mg daily. Patient agreeable and understands dose change.

## 2024-07-03 ENCOUNTER — HOSPITAL ENCOUNTER (OUTPATIENT)
Dept: CARDIAC REHAB | Facility: CLINIC | Age: 59
Discharge: HOME | End: 2024-07-03
Attending: PHYSICIAN ASSISTANT
Payer: COMMERCIAL

## 2024-07-03 PROCEDURE — 93798 PHYS/QHP OP CAR RHAB W/ECG: CPT | Performed by: REHABILITATION PRACTITIONER

## 2024-07-05 ENCOUNTER — TELEPHONE (OUTPATIENT)
Dept: OTHER | Facility: CLINIC | Age: 59
End: 2024-07-05
Payer: COMMERCIAL

## 2024-07-05 NOTE — TELEPHONE ENCOUNTER
I reached patient after receiving a note to see Dr. Arzate sooner than his 8/12 scheduled appointment. Cancellation 7/8 available. I discussed his with patient. He did not increase his Metoprolol as prescribed. He actually stopped taking his Metoprolol instead. He states his BP and HR are labile but did not see a huge change when stopping. Discussed situation with Mer Jones PA-C. Encouraged pt to resume his Metoprolol at the 75 mg diose and monitor BP an HR for Dr. Arzate to review. Patient agreeable to that plan.

## 2024-07-06 ENCOUNTER — HEALTH MAINTENANCE LETTER (OUTPATIENT)
Age: 59
End: 2024-07-06

## 2024-07-08 ENCOUNTER — OFFICE VISIT (OUTPATIENT)
Dept: CARDIOLOGY | Facility: CLINIC | Age: 59
End: 2024-07-08
Payer: COMMERCIAL

## 2024-07-08 VITALS
WEIGHT: 172 LBS | HEIGHT: 72 IN | SYSTOLIC BLOOD PRESSURE: 125 MMHG | DIASTOLIC BLOOD PRESSURE: 88 MMHG | BODY MASS INDEX: 23.3 KG/M2 | HEART RATE: 83 BPM

## 2024-07-08 DIAGNOSIS — Z98.890 HX OF ASCENDING AORTA REPAIR: ICD-10-CM

## 2024-07-08 DIAGNOSIS — Z86.79 STATUS POST ASCENDING AORTIC ANEURYSM REPAIR: ICD-10-CM

## 2024-07-08 DIAGNOSIS — Z98.890 STATUS POST ASCENDING AORTIC ANEURYSM REPAIR: ICD-10-CM

## 2024-07-08 PROCEDURE — 93005 ELECTROCARDIOGRAM TRACING: CPT | Mod: XU | Performed by: INTERNAL MEDICINE

## 2024-07-08 PROCEDURE — 99214 OFFICE O/P EST MOD 30 MIN: CPT | Mod: 25 | Performed by: INTERNAL MEDICINE

## 2024-07-08 PROCEDURE — 93242 EXT ECG>48HR<7D RECORDING: CPT | Performed by: INTERNAL MEDICINE

## 2024-07-08 NOTE — PROGRESS NOTES
Vascular Cardiology       HPI:     This is a 59 year old male with a PMHx of HTN, Bicuspid Aortic Valve with TAA (5.0 cm on previous MRAs) who is here for follow up. He is s/p valve sparing aortic root repair for ascending aortic aneurysm.     Recapping family history of note, his mother has an aneurysm of her basilar artery diagnosed. No history of BAV or TAA in the family.    I had been following Robson for years. His MRA had showed stability of aorta for years, until last scan in 2023 demonstrated new aortic growth. 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 He did well, surgery was 5/2024 with Dr. Torres. Post operatively he had atrial flutter which was self limited.    ROS was negative for phenotypic features of syndromic TAAD except he had pectus deformity and some hypermobility. 6'0 feet tall. No flat feet.    He underwent cardiac rehab for 3 weeks and did well. However heart rates are still elevated. He has not had echocardiogram or CT scan of his aortic graft post operatively as of yet. He has no chest pain, sob, leg swelling, syncope.      In past, he had a brief syncopal episode prompting an event monitor which demonstrated no sustained arrhythmias, no inappropriate sinus tachycardia, no afib and only an isolated episode of SVT (7 beats). Lowest HR was 46 bpm, no pauses. He had note of an IVCD/BBB but not present on EKG. No recurrent episodes.        Assessment and Plan:      59 year old male with a PMHx of HTN, Bicupsid Aortic Valve with TAA (4.9 cm on MRA with slight growth on last MRA) who is now post operative ascending aortic repair, c/b atrial flutter self limited. Still with tachycardia today of unclear etiology.      #Thoracic Aortic Aneurysm  -hereditary subtype, with high risk features including fam history of aneurysm disease but no h/o SCD or  dissection.   -he did experience growth on this last scan therefore in shared decision making approach discussed surgery while his STS was low.   -genetic testing discussed and patient wishes to proceed (he would like to exclude MFS) although etiology likely BAV aortopathy and low likelihood gene test would come back positive unless concomitant FTAAD or other HTAD is present   -continue aspirin      #Bicupsid Aortic Valve  -Fusion of RCC and LCC   -No evidence of stenosis of regurgitation on ECHO  -Echo surveillance for valve function post operatively  -Children have completed screening, no BAV     #Elevated LDL and carotid plaque < 50% on L side  - Started lipitor 20 mg per day.  - Lipid panel surveillance per PCP     #Syncope:  -Vasovagal mediated, event monitor unremarkable for malignant arrhythmias or bradycardia  -Discussed autonomic dysfunction is very common in vascular CTD and behavioral modifications are often necessary, especially when on a beta blocker for aortic protection.     #Tachycardia: suspect possible deconditioning however other etiologies need be ruled out, rv dysfunction, pericardial effusion, etc post op.   -ziopatch today, EKG today  -continue beta blocker   -follow up 2 months with DONN to review iris Arzate MD [facility]       PAST MEDICAL HISTORY  Past Medical History:   Diagnosis Date    Aortic aneurysm (H24)     Ascending aortic aneurysm (H24) 03/01/2018    4.7 cm on echo    Bicuspid aortic valve     Congenital insufficiency of aortic valve     Glaucoma     HTN (hypertension)     Pectus excavatum        CURRENT MEDICATIONS  Current Outpatient Medications   Medication Sig Dispense Refill    dorzolamide-timolol (COSOPT) 22.3-6.8 MG/ML ophthalmic solution Place 1 drop into both eyes 2 times daily      latanoprost (XALATAN) 0.005 % ophthalmic solution Place 1 drop into both eyes every evening      metoprolol succinate ER (TOPROL XL) 25 MG 24 hr tablet Take 3 tablets (75 mg) by  mouth daily for 30 days 90 tablet 0    acetaminophen (TYLENOL) 500 MG tablet Take 2 tablets (1,000 mg) by mouth every 8 hours as needed for other (For optimal non-opioid multimodal pain management to improve pain control.) (Patient not taking: Reported on 7/8/2024)         PAST SURGICAL HISTORY:  Past Surgical History:   Procedure Laterality Date    CV CORONARY ANGIOGRAM N/A 4/16/2024    Procedure: Coronary Angiogram;  Surgeon: Zach Suárez MD;  Location:  HEART CARDI[facility] CATH LAB    CV LEFT HEART CATH N/A 4/16/2024    Procedure: Left Heart Catheterization;  Surgeon: Zach Suárez MD;  Location:  HEART CARDIAC CATH LAB    REPAIR ANEURYSM ASCENDING AORTA N/A 5/31/2024    Procedure: ASCENDING AORTA ANEURYSM REPAIR WITH HEMASHIELD PLATINUM GRAFT SIZE: 30MM, AND ON CARDIOPULMONARY PUMP OXYGENATOR  (INTRAOPERATIVE TRANSESOPHAGEAL ECHOCARDIOGRAM BY ANESTHESIOLOGIST);  Surgeon: Candis Torres MD;  Location:  OR       ALLERGIES   No Known Allergies    FAMILY HISTORY  See above    SOCIAL HISTORY  Social History     Socioeconomic History    Marital status:      Spouse name: Not on file    Number of children: Not on file    Years of education: Not on file    Highest education level: Not on file   Occupational History    Not on file   Tobacco Use    Smoking status: Never    Smokeless tobacco: Never   Substance and Sexual Activity    Alcohol use: Yes     Comment: occasional    Drug use: No    Sexual activity: Not on file   Other Topics Concern    Parent/sibling w/ CABG, MI or angioplasty before 65F 55M? Not Asked   Social History Narrative    Not on file     Social Determinants of Health     Financial Resource Strain: Not At Risk (2/27/2024)    [facility]    Financial Resource Strain     Is it hard for you to pay for the very basics like food, housing, medical care or heating?: No   Food Insecurity: Not At Risk (2/27/2024)    [facility]    Food Insecurity     Does your food run out before you have the money to buy more?: No   Transportation Needs: Not At Risk (2/27/2024)    [facility]    Transportation Needs     Does a lack of transportation keep you from your medical appointments or from getting your medications?: No   Physical Activity: Not on file   Stress: Not on file   Social Connections: Not on file   Interpersonal Safety: Not on file   Housing Stability: Not on file       ROS:   Constitutional: No fever, chills, or sweats. No weight gain/loss   ENT: No visual disturbance, ear ache, epistaxis, sore throat  Allergies/Immunologic: Negative  Respiratory: No cough, hemoptysia  Cardiovascular: As per HPI  GI: No nausea, vomiting, hematemesis, melena, or hematochezia  : No urinary frequency, dysuria, or hematuria  Integument: Negative  Psychiatric: Negative  Neuro: Negative  Endocrinology: Negative   Musculoskeletal: Negative  Vascular: No walking impairment, claudication, ischemic rest pain or nonhealing wounds    EXAM:  /88   Pulse 83   Ht 1.829 m (6')   Wt 78 kg (172 lb)   BMI 23.33 kg/m    In general, the patient is a pleasant male in no apparent distress.    HEENT: NC/AT.  PERRLA.  EOMI.  Sclerae white, not injected.  Nares clear.  Pharynx without erythema or exudate.  Dentition intact.    Neck: No adenopathy.  No thyromegaly. Carotids +2/2 bilaterally without bruits.   Heart: RRR. Normal S1, S2 splits physiologically. No murmur, rub, click, or gallop. The PMI is in the 5th ICS in the midclavicular line. There is no heave.    Lungs: CTA.  No ronchi, wheezes, rales.  No dullness to percussion.   Abdomen: Soft, nontender, nondistended. No organomegaly. No AAA.  No bruits.   Extremities: No clubbing, cyanosis, or edema.    Vascular: No bruits are noted.    Labs:  LIPID RESULTS:  Lab Results   Component Value Date    CHOL 162 06/11/2019    HDL 58 06/11/2019    LDL 76  06/11/2019    TRIG 166 (H) 01/07/2022    TRIG 138 06/11/2019    NHDL 104 06/11/2019       LIVER ENZYME RESULTS:  Lab Results   Component Value Date    AST 27 06/01/2024    AST 17 05/24/2019    ALT 12 06/01/2024    ALT 39 05/24/2019       CBC RESULTS:  Lab Results   Component Value Date    WBC 6.9 06/03/2024    WBC 8.0 05/03/2019    RBC 2.66 (L) 06/03/2024    RBC 4.84 05/03/2019    HGB 8.0 (L) 06/03/2024    HGB 14.2 05/03/2019    HCT 23.8 (L) 06/03/2024    HCT 45.1 05/03/2019    MCV 90 06/03/2024    MCV 93 05/03/2019    MCH 30.1 06/03/2024    MCH 29.3 05/03/2019    MCHC 33.6 06/03/2024    MCHC 31.5 05/03/2019    RDW 12.6 06/03/2024    RDW 12.3 05/03/2019     (L) 06/03/2024     05/03/2019       BMP RESULTS:  Lab Results   Component Value Date     06/03/2024     05/24/2019    POTASSIUM 3.4 06/03/2024    POTASSIUM 4.0 05/31/2024    POTASSIUM 4.4 05/24/2019    CHLORIDE 104 06/03/2024    CHLORIDE 103 01/07/2022    CHLORIDE 105 05/24/2019    CO2 27 06/03/2024    CO2 28 05/24/2019    ANIONGAP 9 06/03/2024    ANIONGAP 5 05/24/2019     (H) 06/03/2024     (H) 06/03/2024    GLC 91 05/24/2019    BUN 9.8 06/03/2024    BUN 20 05/24/2019    CR 0.72 06/03/2024    CR 0.88 05/24/2019    GFRESTIMATED >90 06/03/2024    GFRESTIMATED >90 05/24/2019    GFRESTBLACK >90 05/24/2019    HALEY 8.3 (L) 06/03/2024    HALEY 9.5 05/24/2019        A1C RESULTS:  Lab Results   Component Value Date    A1C 5.5 04/16/2024

## 2024-07-08 NOTE — PROGRESS NOTES
Robson Gutierrez arrived here on 7/8/2024 9:44 AM for 3-7 Days  Zio monitor placement per ordering provider Dr. Arzate for the diagnosis Hx of ascending aorta repair [Z98.890].  Patient s skin was prepped per protocol. Dr. Arzate is the supervising MD.  Zio monitor was placed.  Instructions were reviewed with and given to the patient.  Patient verbalized understanding of wear, troubleshooting and monitor return instructions.  Eduardo

## 2024-07-08 NOTE — PATIENT INSTRUCTIONS
1. EKG today  2. Ziopatch today  3. Echocardiogram  4. CT angiogram chest  5. Follow up [facility] in 2-3 months

## 2024-07-08 NOTE — LETTER
7/8/2024    Lidia Garcia MD  [facility] [address]  [facility] [id number]    RE: Robson Gutierrez       Dear Colleague,     I had the pleasure of seeing Robson Gutierrez in the [facility].           Vascular Cardiology       HPI:     This is a 59 year old male with a PMHx of HTN, Bicuspid Aortic Valve with TAA (5.0 cm on previous MRAs) who is here for follow up. He is s/p valve sparing aortic root repair for ascending aortic aneurysm.     Recapping family history of note, his mother has an aneurysm of her basilar artery diagnosed. No history of BAV or TAA in the family.    I had been following Robson for years. His MRA had showed stability of aorta for years, until last scan in 2023 demonstrated new aortic growth. 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 He did well, surgery was 5/2024 with Dr. Torres. Post operatively he had atrial flutter which was self limited.    ROS was negative for phenotypic features of syndromic TAAD except he had pectus deformity and some hypermobility. 6'0 feet tall. No flat feet.    He underwent cardiac rehab for 3 weeks and did well. However heart rates are still elevated. He has not had echocardiogram or CT scan of his aortic graft post operatively as of yet. He has no chest pain, sob, leg swelling, syncope.      In past, he had a brief syncopal episode prompting an event monitor which demonstrated no sustained arrhythmias, no inappropriate sinus tachycardia, no afib and only an isolated episode of SVT (7 beats). Lowest HR was 46 bpm, no pauses. He had note of an IVCD/BBB but not present on EKG. No recurrent episodes.        Assessment and Plan:      59 year old male with a PMHx of HTN, Bicupsid Aortic Valve with TAA (4.9 cm on MRA with slight growth on last MRA) who is now  post operative ascending aortic repair, c/b atrial flutter self limited. Still with tachycardia today of unclear etiology.      #Thoracic Aortic Aneurysm  -hereditary subtype, with high risk features including fam history of aneurysm disease but no h/o SCD or dissection.   -he did experience growth on this last scan therefore in shared decision making approach discussed surgery while his STS was low.   -genetic testing discussed and patient wishes to proceed (he would like to exclude MFS) although etiology likely BAV aortopathy and low likelihood gene test would come back positive unless concomitant FTAAD or other HTAD is present   -continue aspirin      #Bicupsid Aortic Valve  -Fusion of RCC and LCC   -No evidence of stenosis of regurgitation on ECHO  -Echo surveillance for valve function post operatively  -Children have completed screening, no BAV     #Elevated LDL and carotid plaque < 50% on L side  - Started lipitor 20 mg per day.  - Lipid panel surveillance per PCP     #Syncope:  -Vasovagal mediated, event monitor unremarkable for malignant arrhythmias or bradycardia  -Discussed autonomic dysfunction is very common in vascular CTD and behavioral modifications are often necessary, especially when on a beta blocker for aortic protection.     #Tachycardia: suspect possible deconditioning however other etiologies need be ruled out, rv dysfunction, pericardial effusion, etc post op.   -ziopatch today, EKG today  -continue beta blocker   -follow up 2 months with DONN to review iris Arzate MD [facility]       PAST MEDICAL HISTORY  Past Medical History:   Diagnosis Date    Aortic aneurysm (H24)     Ascending aortic aneurysm (H24) 03/01/2018    4.7 cm on echo    Bicuspid aortic valve     Congenital insufficiency of aortic valve     Glaucoma     HTN (hypertension)     Pectus excavatum        CURRENT MEDICATIONS  Current Outpatient Medications   Medication Sig Dispense Refill    dorzolamide-timolol (COSOPT)  22.3-6.8 MG/ML ophthalmic solution Place 1 drop into both eyes 2 times daily      latanoprost (XALATAN) 0.005 % ophthalmic solution Place 1 drop into both eyes every evening      metoprolol succinate ER (TOPROL XL) 25 MG 24 hr tablet Take 3 tablets (75 mg) by mouth daily for 30 days 90 tablet 0    acetaminophen (TYLENOL) 500 MG tablet Take 2 tablets (1,000 mg) by mouth every 8 hours as needed for other (For optimal non-opioid multimodal pain management to improve pain control.) (Patient not taking: Reported on 7/8/2024)         PAST SURGICAL HISTORY:  Past Surgical History:   Procedure Laterality Date    CV CORONARY ANGIOGRAM N/A 4/16/2024    Procedure: Coronary Angiogram;  Surgeon: Zach Suárez MD;  Location:  HEART CARDIAC CATH LAB    CV LEFT HEART CATH N/A 4/16/2024    Procedure: Left Heart Catheterization;  Surgeon: Zach Suárez MD;  Location:  HEART CARDIAC CATH LAB    REPAIR ANEURYSM ASCENDING AORTA N/A 5/31/2024    Procedure: ASCENDING AORTA ANEURYSM REPAIR WITH HEMASHIELD PLATINUM GRAFT SIZE: 30MM, AND ON CARDIOPULMONARY PUMP OXYGENATOR  (INTRAOPERATIVE TRANSESOPHAGEAL ECHOCARDIOGRAM BY ANESTHESIOLOGIST);  Surgeon: Candis Torres MD;  Location:  OR       ALLERGIES   No Known Allergies    FAMILY HISTORY  See above    SOCIAL HISTORY  Social History     Socioeconomic History    Marital status:      Spouse name: Not on file    Number of children: Not on file    Years of education: Not on file    Highest education level: Not on file   Occupational History    Not on file   Tobacco Use    Smoking status: Never    Smokeless tobacco: Never   Substance and Sexual Activity    Alcohol use: Yes     Comment: occasional    Drug use: No    Sexual activity: Not on file   Other Topics Concern    Parent/sibling w/ CABG, MI or angioplasty before 65F 55M? Not Asked   Social History Narrative    Not on file     Social Determinants of Health     Financial Resource Strain: Not At Risk  (2/27/2024)    Received from [facility]    Financial Resource Strain     Is it hard for you to pay for the very basics like food, housing, medical care or heating?: No   Food Insecurity: Not At Risk (2/27/2024)    Received from [facility]    Food Insecurity     Does your food run out before you have the money to buy more?: No   Transportation Needs: Not At Risk (2/27/2024)    Received from [facility]    Transportation Needs     Does a lack of transportation keep you from your medical appointments or from getting your medications?: No   Physical Activity: Not on file   Stress: Not on file   Social Connections: Not on file   Interpersonal Safety: Not on file   Housing Stability: Not on file       ROS:   Constitutional: No fever, chills, or sweats. No weight gain/loss   ENT: No visual disturbance, ear ache, epistaxis, sore throat  Allergies/Immunologic: Negative  Respiratory: No cough, hemoptysia  Cardiovascular: As per HPI  GI: No nausea, vomiting, hematemesis, melena, or hematochezia  : No urinary frequency, dysuria, or hematuria  Integument: Negative  Psychiatric: Negative  Neuro: Negative  Endocrinology: Negative   Musculoskeletal: Negative  Vascular: No walking impairment, claudication, ischemic rest pain or nonhealing wounds    EXAM:  /88   Pulse 83   Ht 1.829 m (6')   Wt 78 kg (172 lb)   BMI 23.33 kg/m    In general, the patient is a pleasant male in no apparent distress.    HEENT: NC/AT.  PERRLA.  EOMI.  Sclerae white, not injected.  Nares clear.  Pharynx without erythema or exudate.  Dentition intact.    Neck: No adenopathy.  No thyromegaly. Carotids +2/2 bilaterally without bruits.   Heart: RRR. Normal S1, S2 splits physiologically. No murmur, rub, click, or gallop. The PMI is in the 5th ICS in the midclavicular line. There is no heave.    Lungs: CTA.  No ronchi, wheezes, rales.  No dullness to percussion.   Abdomen: Soft, nontender,  nondistended. No organomegaly. No AAA.  No bruits.   Extremities: No clubbing, cyanosis, or edema.    Vascular: No bruits are noted.    Labs:  LIPID RESULTS:  Lab Results   Component Value Date    CHOL 162 06/11/2019    HDL 58 06/11/2019    LDL 76 06/11/2019    TRIG 166 (H) 01/07/2022    TRIG 138 06/11/2019    NHDL 104 06/11/2019       LIVER ENZYME RESULTS:  Lab Results   Component Value Date    AST 27 06/01/2024    AST 17 05/24/2019    ALT 12 06/01/2024    ALT 39 05/24/2019       CBC RESULTS:  Lab Results   Component Value Date    WBC 6.9 06/03/2024    WBC 8.0 05/03/2019    RBC 2.66 (L) 06/03/2024    RBC 4.84 05/03/2019    HGB 8.0 (L) 06/03/2024    HGB 14.2 05/03/2019    HCT 23.8 (L) 06/03/2024    HCT 45.1 05/03/2019    MCV 90 06/03/2024    MCV 93 05/03/2019    MCH 30.1 06/03/2024    MCH 29.3 05/03/2019    MCHC 33.6 06/03/2024    MCHC 31.5 05/03/2019    RDW 12.6 06/03/2024    RDW 12.3 05/03/2019     (L) 06/03/2024     05/03/2019       BMP RESULTS:  Lab Results   Component Value Date     06/03/2024     05/24/2019    POTASSIUM 3.4 06/03/2024    POTASSIUM 4.0 05/31/2024    POTASSIUM 4.4 05/24/2019    CHLORIDE 104 06/03/2024    CHLORIDE 103 01/07/2022    CHLORIDE 105 05/24/2019    CO2 27 06/03/2024    CO2 28 05/24/2019    ANIONGAP 9 06/03/2024    ANIONGAP 5 05/24/2019     (H) 06/03/2024     (H) 06/03/2024    GLC 91 05/24/2019    BUN 9.8 06/03/2024    BUN 20 05/24/2019    CR 0.72 06/03/2024    CR 0.88 05/24/2019    GFRESTIMATED >90 06/03/2024    GFRESTIMATED >90 05/24/2019    GFRESTBLACK >90 05/24/2019    HALEY 8.3 (L) 06/03/2024    HALEY 9.5 05/24/2019        A1C RESULTS:  Lab Results   Component Value Date    A1C 5.5 04/16/2024   Thank you for allowing me to participate in the care of your patient.      Sincerely,   Aretha Arzate MD   [facility]

## 2024-07-19 PROCEDURE — 93244 EXT ECG>48HR<7D REV&INTERPJ: CPT | Performed by: INTERNAL MEDICINE

## 2024-07-22 ENCOUNTER — MYC MEDICAL ADVICE (OUTPATIENT)
Dept: CARDIOLOGY | Facility: CLINIC | Age: 59
End: 2024-07-22
Payer: COMMERCIAL

## 2024-07-22 DIAGNOSIS — Z98.890 HX OF ASCENDING AORTA REPAIR: Primary | ICD-10-CM

## 2024-07-22 DIAGNOSIS — I10 PRIMARY HYPERTENSION: ICD-10-CM

## 2024-07-26 RX ORDER — CARVEDILOL 6.25 MG/1
6.25 TABLET ORAL 2 TIMES DAILY WITH MEALS
Qty: 180 TABLET | Refills: 3 | Status: SHIPPED | OUTPATIENT
Start: 2024-07-26

## 2024-08-06 ENCOUNTER — HOSPITAL ENCOUNTER (OUTPATIENT)
Dept: CARDIOLOGY | Facility: CLINIC | Age: 59
Discharge: HOME | End: 2024-08-06
Attending: INTERNAL MEDICINE
Payer: COMMERCIAL

## 2024-08-06 VITALS — SYSTOLIC BLOOD PRESSURE: 128 MMHG | DIASTOLIC BLOOD PRESSURE: 89 MMHG

## 2024-08-06 DIAGNOSIS — Z98.890 HX OF ASCENDING AORTA REPAIR: ICD-10-CM

## 2024-08-06 LAB — LVEF ECHO: NORMAL

## 2024-08-06 PROCEDURE — 250N000011 HC RX IP 250 OP 636: Performed by: INTERNAL MEDICINE

## 2024-08-06 PROCEDURE — 93306 TTE W/DOPPLER COMPLETE: CPT

## 2024-08-06 PROCEDURE — 71275 CT ANGIOGRAPHY CHEST: CPT

## 2024-08-06 PROCEDURE — 93306 TTE W/DOPPLER COMPLETE: CPT | Mod: 26 | Performed by: INTERNAL MEDICINE

## 2024-08-06 RX ORDER — LIDOCAINE 40 MG/G
CREAM TOPICAL
OUTPATIENT
Start: 2024-08-06

## 2024-08-06 RX ORDER — METHYLPREDNISOLONE SODIUM SUCCINATE 125 MG/2ML
125 INJECTION, POWDER, LYOPHILIZED, FOR SOLUTION INTRAMUSCULAR; INTRAVENOUS
Status: DISCONTINUED | OUTPATIENT
Start: 2024-08-06 | End: 2024-08-07 | Stop reason: HOSPADM

## 2024-08-06 RX ORDER — DIPHENHYDRAMINE HCL 25 MG
25 CAPSULE ORAL
Status: DISCONTINUED | OUTPATIENT
Start: 2024-08-06 | End: 2024-08-07 | Stop reason: HOSPADM

## 2024-08-06 RX ORDER — IOPAMIDOL 755 MG/ML
500 INJECTION, SOLUTION INTRAVASCULAR ONCE
Status: COMPLETED | OUTPATIENT
Start: 2024-08-06 | End: 2024-08-06

## 2024-08-06 RX ORDER — ONDANSETRON 2 MG/ML
4 INJECTION INTRAMUSCULAR; INTRAVENOUS
Status: DISCONTINUED | OUTPATIENT
Start: 2024-08-06 | End: 2024-08-07 | Stop reason: HOSPADM

## 2024-08-06 RX ORDER — DIPHENHYDRAMINE HYDROCHLORIDE 50 MG/ML
25-50 INJECTION INTRAMUSCULAR; INTRAVENOUS
Status: DISCONTINUED | OUTPATIENT
Start: 2024-08-06 | End: 2024-08-07 | Stop reason: HOSPADM

## 2024-08-06 RX ADMIN — IOPAMIDOL 100 ML: 755 INJECTION, SOLUTION INTRAVENOUS at 13:01

## 2024-08-14 ENCOUNTER — OFFICE VISIT (OUTPATIENT)
Dept: CARDIOLOGY | Facility: CLINIC | Age: 59
End: 2024-08-14
Payer: COMMERCIAL

## 2024-08-14 VITALS
HEART RATE: 92 BPM | DIASTOLIC BLOOD PRESSURE: 85 MMHG | SYSTOLIC BLOOD PRESSURE: 121 MMHG | BODY MASS INDEX: 23.45 KG/M2 | HEIGHT: 72 IN | WEIGHT: 173.1 LBS | OXYGEN SATURATION: 99 %

## 2024-08-14 DIAGNOSIS — I71.21 ANEURYSM OF ASCENDING AORTA WITHOUT RUPTURE (H): ICD-10-CM

## 2024-08-14 DIAGNOSIS — Z86.79 STATUS POST ASCENDING AORTIC ANEURYSM REPAIR: ICD-10-CM

## 2024-08-14 DIAGNOSIS — Z98.890 HX OF ASCENDING AORTA REPAIR: Primary | ICD-10-CM

## 2024-08-14 DIAGNOSIS — I10 ESSENTIAL HYPERTENSION: ICD-10-CM

## 2024-08-14 DIAGNOSIS — Z98.890 STATUS POST ASCENDING AORTIC ANEURYSM REPAIR: ICD-10-CM

## 2024-08-14 DIAGNOSIS — Q23.81 BICUSPID AORTIC VALVE: ICD-10-CM

## 2024-08-14 PROCEDURE — 99215 OFFICE O/P EST HI 40 MIN: CPT | Mod: 24 | Performed by: NURSE PRACTITIONER

## 2024-08-14 RX ORDER — CHLORTHALIDONE 25 MG/1
12.5 TABLET ORAL DAILY
Qty: 45 TABLET | Refills: 3 | Status: SHIPPED | OUTPATIENT
Start: 2024-08-14

## 2024-08-14 NOTE — PATIENT INSTRUCTIONS
Thank you for your visit with the [facility] today.    Medication changes and/or recommendations from today:   - START Chlorthalidone 12.5 mg once daily.  - Continue to monitor your blood pressure. Call if it starts to run low (under 90/60) consistently or if you start to have any issues with dizziness or lightheadedness with this.    Follow up plan:   - Check non-fasting labs in 7-10 days after starting the chlorthalidone.  - Check an echocardiogram within the next couple of weeks to recheck the pericardial effusion.  - Follow up with Dr. Arzate in about 6 months.     If you have questions or concerns in the meantime please call the nurse team at [phone] or send a [employer] message.     Scheduling phone number: [phone]    It was a pleasure seeing you today!     CORNELIO Blankenship, CNP  Nurse Practitioner  [facility]

## 2024-08-14 NOTE — PROGRESS NOTES
Cardiology Clinic Progress Note    Service Date: August 14, 2024  Primary Cardiology Team: Dr. Arzate    HISTORY OF PRESENT ILLNESS:  I had the pleasure of meeting Mr. Robson Gutierrez in the clinic today. He is a very nice 59 year old gentleman who is a  at the [facility] with a past medical history notable for a bicuspid aortic valve with a 4.9 cm ascending aortic aneurysm. He underwent ascending aorta replacement with 30 mm Hemashield interposition graft by Dr. Torres on 5/31/24. Post-operatively he had atrial flutter which was self limited.    He was seen by Dr. Arzate in the clinic in follow-up on 7/8/24 and was doing well at that time. He went through cardiac rehab and tolerated this well without issues.  Heart rates had been borderline elevated so a Zio patch monitor was recommended for further evaluation and to rule out any recurrent episodes of atrial flutter.  Complete 7-day Zio patch monitor was worn from 7/8-7/15/24 showing predominantly sinus rhythm with an average heart rate of 94 bpm.  Rare PACs and PVCs were noted as well as a single 7 beat run of NSVT.  No recurrence of atrial flutter or any atrial fibrillation were noted.    A repeat echocardiogram was also recommended was completed on 8/6/2024 showing low normal ejection fraction reported at 48-54%. A small circumferential pericardial effusion is noted. Bicuspid aortic valve is again noted with mild valvular aortic stenosis. Ascending aorta measures at 31 mm at the conduit and 38 mm at sinus Valsalva (native), Arch not imaged on this echo.    CTA of the chest was also completed on 8/6/2024 showing the thoracic aorta at the sinuses of Valsalva has a maximum  sinus to sinus diameter of approximately 3.7 cm and ascending thoracic aortic reconstruction utilizing a tube graft with the graft patent without significant stenosis. A mild amount of perigraft fluid is reported. The ascending thoracic aorta just  distal to the graft has a maximum diameter of 3.4 cm. The thoracic aorta at the level of the arch and the descending thoracic aorta are of normal caliber.    Today,  presents to the clinic in follow up of his recent test results.  He tells me that he has been feeling well from a cardiac standpoint.  He has been exercising several times per week and has been tolerating his usual activity level without any exertional symptoms or undue fatigue.  He denies any symptoms of chest pain, shortness of breath, palpitations, or heart racing.  He has not had any dizziness or lightheadedness.  He brings a detailed log of his recent blood pressure and heart rate readings from home and notes that he has had fairly frequent blood pressure readings up to the 130s systolic and upper 80s to 90s diastolic.  He met with his primary care physician earlier this week and they had discussed possibly increasing his carvedilol dose or adding chlorthalidone for better blood pressure control.  He wanted to wait before making an adjustment to discuss this at the visit today.    ASSESSMENT:  Thoracic Aortic Aneurysm status post ascending aorta replacement with 30 mm Hemashield interposition graft by Dr. Torres on 5/31/24.  Brief episode of atrial flutter postoperatively which was self-limiting with no recurrence on recent Zio patch monitoring.  Small circumferential pericardial effusion noted on recent TTE as well as low normal EF reported at 48-54%.  EF was 60-65% on previous echocardiac from August 2023.  Appears well compensated euvolemic on exam and stable without any symptoms concerning for tamponade.  Bicuspid aortic valve with mild aortic stenosis  Essential hypertension. Blood pressure suboptimally controlled with frequent readings up to the 130s systolic and 90s diastolic at home    PLAN:  - Recommend a repeat echocardiogram in the next 2-3 weeks for reassessment of his small pericardial effusion and ejection fraction.  -  Recommended a trial of increasing carvedilol from 6.25 mg twice daily up to 12.5 mg twice daily for better blood pressure control. He does not feel carvedilol has been effective in lowering his blood pressure and instead would like to try adding chlorthalidone as was suggested by his primary care physician earlier this week. We will start this at 12.5 mg once daily and plan to repeat a basic metabolic panel in about 7 to 10 days after starting on this. Counseled on continue to monitor his blood pressure regularly at home and reviewed to call the clinic if it begins to run low (under 90/60) consistently or if he starts to have any issues with dizziness or lightheadedness after starting this.  - Follow-up with Dr. Arzate in about 6 months. Would defer to her in follow up regarding timing of repeat imaging for continued monitoring of his thoracic aorta and mild bicuspid aortic stenosis.    Thank you for the opportunity to participate in this pleasant patient's care. We would be happy to see him sooner if needed for any concerns in the meantime.    45 total minutes was spent today including chart review, precharting, history and exam, post visit documentation, and reviewing studies as outlined above.     CORNELIO Blankenship, CNP   Nurse Practitioner  [facility]  Pager: [phone]  Text Page  (8am - 5pm, M-F)    Orders this Visit:  Orders Placed This Encounter   Procedures    Basic metabolic panel    Follow-Up with Cardiology    Echocardiogram Complete     Orders Placed This Encounter   Medications    chlorthalidone (HYGROTON) 25 MG tablet     Sig: Take 0.5 tablets (12.5 mg) by mouth daily     Dispense:  45 tablet     Refill:  3     There are no discontinued medications.  Encounter Diagnoses   Name Primary?    Hx of ascending aorta repair Yes    Essential hypertension     Aneurysm of ascending aorta without rupture (H24)     Bicuspid aortic valve     Status post ascending aortic aneurysm repair       CURRENT MEDICATIONS:  Current Outpatient Medications   Medication Sig Dispense Refill    carvedilol (COREG) 6.25 MG tablet Take 1 tablet (6.25 mg) by mouth 2 times daily (with meals) 180 tablet 3    chlorthalidone (HYGROTON) 25 MG tablet Take 0.5 tablets (12.5 mg) by mouth daily 45 tablet 3    dorzolamide-timolol (COSOPT) 22.3-6.8 MG/ML ophthalmic solution Place 1 drop into both eyes 2 times daily      latanoprost (XALATAN) 0.005 % ophthalmic solution Place 1 drop into both eyes every evening      acetaminophen (TYLENOL) 500 MG tablet Take 2 tablets (1,000 mg) by mouth every 8 hours as needed for other (For optimal non-opioid multimodal pain management to improve pain control.) (Patient not taking: Reported on 7/8/2024)       ALLERGIES  No Known Allergies    PAST MEDICAL, SURGICAL, FAMILY HISTORY:  History was reviewed and updated as needed, see medical record.    SOCIAL HISTORY:  Social History     Socioeconomic History    Marital status:      Spouse name: Not on file    Number of children: Not on file    Years of education: Not on file    Highest education level: Not on file   Occupational History    Not on file   Tobacco Use    Smoking status: Never    Smokeless tobacco: Never   Substance and Sexual Activity    Alcohol use: Yes     Comment: occasional    Drug use: No    Sexual activity: Not on file   Other Topics Concern    Parent/sibling w/ CABG, MI or angioplasty before 65F 55M? Not Asked   Social History Narrative    Not on file     Social Determinants of Health     Financial Resource Strain: Not At Risk (2/27/2024)    Received from [facility]    Financial Resource Strain     Is it hard for you to pay for the very basics like food, housing, medical care or heating?: No   Food Insecurity: Not At Risk (2/27/2024)    Received from [facility]    Food Insecurity     Does your food run out before you have the money to buy more?: No   Transportation Needs: Not At  Risk (2/27/2024)    [facility]    Transportation Needs     Does a lack of transportation keep you from your medical appointments or from getting your medications?: No   Physical Activity: Not on file   Stress: Not on file   Social Connections: Not on file   Interpersonal Safety: Not on file   Housing Stability: Not on file     Review of Systems:  Focused cardiovascular and respiratory review of systems is negative other than the symptoms noted above in the HPI.     Physical Exam:  Vitals: /85 (BP Location: Left arm, Patient Position: Sitting)   Pulse 92   Ht 1.829 m (6')   Wt 78.5 kg (173 lb 1.6 oz)   SpO2 99%   BMI 23.48 kg/m     Wt Readings from Last 4 Encounters:   08/14/24 78.5 kg (173 lb 1.6 oz)   07/08/24 78 kg (172 lb)   06/12/24 77.3 kg (170 lb 6.4 oz)   06/03/24 80.4 kg (177 lb 4 oz)     Constitutional: Alert and in no acute distress.  Neck: No JVD.  Cardiovascular:  Regular rate and rhythm. Grade 2/6 systolic ejection murmur heard best at the RUSB. No rub or gallop.   Respiratory: Breathing non-labored. Lungs are clear to auscultation with no wheezes or crackles bilaterally.  Gastrointestinal: Abdomen non-distended.  Extremities: No lower extremity edema bilaterally.   Neuropsychiatric: No gross focal deficits. Affect appropriate. Mentation normal.     Recent Lab Results:  LIPID RESULTS:  Lab Results   Component Value Date    CHOL 162 06/11/2019    HDL 58 06/11/2019    LDL 76 06/11/2019    TRIG 166 (H) 01/07/2022    TRIG 138 06/11/2019     LIVER ENZYME RESULTS:  Lab Results   Component Value Date    AST 27 06/01/2024    AST 17 05/24/2019    ALT 12 06/01/2024    ALT 39 05/24/2019     CBC RESULTS:  Lab Results   Component Value Date    WBC 6.9 06/03/2024    WBC 8.0 05/03/2019    RBC 2.66 (L) 06/03/2024    RBC 4.84 05/03/2019    HGB 8.0 (L) 06/03/2024    HGB 14.2 05/03/2019    HCT 23.8 (L) 06/03/2024    HCT 45.1 05/03/2019    MCV 90 06/03/2024    MCV 93 05/03/2019     MCH 30.1 06/03/2024    MCH 29.3 05/03/2019    MCHC 33.6 06/03/2024    MCHC 31.5 05/03/2019    RDW 12.6 06/03/2024    RDW 12.3 05/03/2019     (L) 06/03/2024     05/03/2019     BMP RESULTS:  Lab Results   Component Value Date     06/03/2024     05/24/2019    POTASSIUM 3.4 06/03/2024    POTASSIUM 4.0 05/31/2024    POTASSIUM 4.4 05/24/2019    CHLORIDE 104 06/03/2024    CHLORIDE 103 01/07/2022    CHLORIDE 105 05/24/2019    CO2 27 06/03/2024    CO2 28 05/24/2019    ANIONGAP 9 06/03/2024    ANIONGAP 5 05/24/2019     (H) 06/03/2024     (H) 06/03/2024    GLC 91 05/24/2019    BUN 9.8 06/03/2024    BUN 20 05/24/2019    CR 0.72 06/03/2024    CR 0.88 05/24/2019    GFRESTIMATED >90 06/03/2024    GFRESTIMATED >90 05/24/2019    GFRESTBLACK >90 05/24/2019    HALEY 8.3 (L) 06/03/2024    HALEY 9.5 05/24/2019      A1C RESULTS:  Lab Results   Component Value Date    A1C 5.5 04/16/2024     CC  Aretha Arzate MD  [address] [place] [id number]    Please kindly note that this document was completed in part using Dragon voice recognition software. Although reviewed after completion, some word substitutions and typographical errors may occur. Please contact me if clarification is needed.

## 2024-08-14 NOTE — LETTER
8/14/2024    Lidia Garcia MD  [facility] [address]  [facility] [address]    RE: Robson Gutierrez       Dear Colleague,     I had the pleasure of seeing Robson Gutierrez in the [facility].    Cardiology Clinic Progress Note    Service Date: August 14, 2024  Primary Cardiology Team: Dr. Arzate    HISTORY OF PRESENT ILLNESS:  I had the pleasure of meeting Mr. Robson Gutierrez in the clinic today. He is a very nice 59 year old gentleman who is a  at the [facility] with a past medical history notable for a bicuspid aortic valve with a 4.9 cm ascending aortic aneurysm. He underwent ascending aorta replacement with 30 mm Hemashield interposition graft by Dr. Torres on 5/31/24. Post-operatively he had atrial flutter which was self limited.    He was seen by Dr. Arzate in the clinic in follow-up on 7/8/24 and was doing well at that time. He went through cardiac rehab and tolerated this well without issues.  Heart rates had been borderline elevated so a Zio patch monitor was recommended for further evaluation and to rule out any recurrent episodes of atrial flutter.  Complete 7-day Zio patch monitor was worn from 7/8-7/15/24 showing predominantly sinus rhythm with an average heart rate of 94 bpm.  Rare PACs and PVCs were noted as well as a single 7 beat run of NSVT.  No recurrence of atrial flutter or any atrial fibrillation were noted.    A repeat echocardiogram was also recommended was completed on 8/6/2024 showing low normal ejection fraction reported at 48-54%. A small circumferential pericardial effusion is noted. Bicuspid aortic valve is again noted with mild valvular aortic stenosis. Ascending aorta measures at 31 mm at the conduit and 38 mm at sinus Valsalva (native), Arch not imaged on this echo.    CTA of the chest was also completed on 8/6/2024 showing the thoracic aorta at the sinuses of  Valsalva has a maximum  sinus to sinus diameter of approximately 3.7 cm and ascending thoracic aortic reconstruction utilizing a tube graft with the graft patent without significant stenosis. A mild amount of perigraft fluid is reported. The ascending thoracic aorta just distal to the graft has a maximum diameter of 3.4 cm. The thoracic aorta at the level of the arch and the descending thoracic aorta are of normal caliber.    Today,  presents to the clinic in follow up of his recent test results.  He tells me that he has been feeling well from a cardiac standpoint.  He has been exercising several times per week and has been tolerating his usual activity level without any exertional symptoms or undue fatigue.  He denies any symptoms of chest pain, shortness of breath, palpitations, or heart racing.  He has not had any dizziness or lightheadedness.  He brings a detailed log of his recent blood pressure and heart rate readings from home and notes that he has had fairly frequent blood pressure readings up to the 130s systolic and upper 80s to 90s diastolic.  He met with his primary care physician earlier this week and they had discussed possibly increasing his carvedilol dose or adding chlorthalidone for better blood pressure control.  He wanted to wait before making an adjustment to discuss this at the visit today.    ASSESSMENT:  Thoracic Aortic Aneurysm status post ascending aorta replacement with 30 mm Hemashield interposition graft by Dr. Torres on 5/31/24.  Brief episode of atrial flutter postoperatively which was self-limiting with no recurrence on recent Zio patch monitoring.  Small circumferential pericardial effusion noted on recent TTE as well as low normal EF reported at 48-54%.  EF was 60-65% on previous echocardiac from August 2023.  Appears well compensated euvolemic on exam and stable without any symptoms concerning for tamponade.  Bicuspid aortic valve with mild aortic stenosis  Essential  hypertension. Blood pressure suboptimally controlled with frequent readings up to the 130s systolic and 90s diastolic at home    PLAN:  - Recommend a repeat echocardiogram in the next 2-3 weeks for reassessment of his small pericardial effusion and ejection fraction.  - Recommended a trial of increasing carvedilol from 6.25 mg twice daily up to 12.5 mg twice daily for better blood pressure control. He does not feel carvedilol has been effective in lowering his blood pressure and instead would like to try adding chlorthalidone as was suggested by his primary care physician earlier this week. We will start this at 12.5 mg once daily and plan to repeat a basic metabolic panel in about 7 to 10 days after starting on this. Counseled on continue to monitor his blood pressure regularly at home and reviewed to call the clinic if it begins to run low (under 90/60) consistently or if he starts to have any issues with dizziness or lightheadedness after starting this.  - Follow-up with Dr. Arzate in about 6 months. Would defer to her in follow up regarding timing of repeat imaging for continued monitoring of his thoracic aorta and mild bicuspid aortic stenosis.    Thank you for the opportunity to participate in this pleasant patient's care. We would be happy to see him sooner if needed for any concerns in the meantime.    45 total minutes was spent today including chart review, precharting, history and exam, post visit documentation, and reviewing studies as outlined above.     CORNELIO Blankenship, CNP   Nurse Practitioner  [facility]  Pager: [phone]  Text Page  (8am - 5pm, M-F)    Orders this Visit:  Orders Placed This Encounter   Procedures     Basic metabolic panel     Follow-Up with Cardiology     Echocardiogram Complete     Orders Placed This Encounter   Medications     chlorthalidone (HYGROTON) 25 MG tablet     Sig: Take 0.5 tablets (12.5 mg) by mouth daily     Dispense:  45 tablet     Refill:  3      There are no discontinued medications.  Encounter Diagnoses   Name Primary?     Hx of ascending aorta repair Yes     Essential hypertension      Aneurysm of ascending aorta without rupture (H24)      Bicuspid aortic valve      Status post ascending aortic aneurysm repair      CURRENT MEDICATIONS:  Current Outpatient Medications   Medication Sig Dispense Refill     carvedilol (COREG) 6.25 MG tablet Take 1 tablet (6.25 mg) by mouth 2 times daily (with meals) 180 tablet 3     chlorthalidone (HYGROTON) 25 MG tablet Take 0.5 tablets (12.5 mg) by mouth daily 45 tablet 3     dorzolamide-timolol (COSOPT) 22.3-6.8 MG/ML ophthalmic solution Place 1 drop into both eyes 2 times daily       latanoprost (XALATAN) 0.005 % ophthalmic solution Place 1 drop into both eyes every evening       acetaminophen (TYLENOL) 500 MG tablet Take 2 tablets (1,000 mg) by mouth every 8 hours as needed for other (For optimal non-opioid multimodal pain management to improve pain control.) (Patient not taking: Reported on 7/8/2024)       ALLERGIES  No Known Allergies    PAST MEDICAL, SURGICAL, FAMILY HISTORY:  History was reviewed and updated as needed, see medical record.    SOCIAL HISTORY:  Social History     Socioeconomic History     Marital status:      Spouse name: Not on file     Number of children: Not on file     Years of education: Not on file     Highest education level: Not on file   Occupational History     Not on file   Tobacco Use     Smoking status: Never     Smokeless tobacco: Never   Substance and Sexual Activity     Alcohol use: Yes     Comment: occasional     Drug use: No     Sexual activity: Not on file   Other Topics Concern     Parent/sibling w/ CABG, MI or angioplasty before 65F 55M? Not Asked   Social History Narrative     Not on file     Social Determinants of Health     Financial Resource Strain: Not At Risk (2/27/2024)    [facility]    Financial Resource Strain      Is it hard for  you to pay for the very basics like food, housing, medical care or heating?: No   Food Insecurity: Not At Risk (2/27/2024)    [facility]    Food Insecurity      Does your food run out before you have the money to buy more?: No   Transportation Needs: Not At Risk (2/27/2024)    [facility]    Transportation Needs      Does a lack of transportation keep you from your medical appointments or from getting your medications?: No   Physical Activity: Not on file   Stress: Not on file   Social Connections: Not on file   Interpersonal Safety: Not on file   Housing Stability: Not on file     Review of Systems:  Focused cardiovascular and respiratory review of systems is negative other than the symptoms noted above in the HPI.     Physical Exam:  Vitals: /85 (BP Location: Left arm, Patient Position: Sitting)   Pulse 92   Ht 1.829 m (6')   Wt 78.5 kg (173 lb 1.6 oz)   SpO2 99%   BMI 23.48 kg/m     Wt Readings from Last 4 Encounters:   08/14/24 78.5 kg (173 lb 1.6 oz)   07/08/24 78 kg (172 lb)   06/12/24 77.3 kg (170 lb 6.4 oz)   06/03/24 80.4 kg (177 lb 4 oz)     Constitutional: Alert and in no acute distress.  Neck: No JVD.  Cardiovascular:  Regular rate and rhythm. Grade 2/6 systolic ejection murmur heard best at the RUSB. No rub or gallop.   Respiratory: Breathing non-labored. Lungs are clear to auscultation with no wheezes or crackles bilaterally.  Gastrointestinal: Abdomen non-distended.  Extremities: No lower extremity edema bilaterally.   Neuropsychiatric: No gross focal deficits. Affect appropriate. Mentation normal.     Recent Lab Results:  LIPID RESULTS:  Lab Results   Component Value Date    CHOL 162 06/11/2019    HDL 58 06/11/2019    LDL 76 06/11/2019    TRIG 166 (H) 01/07/2022    TRIG 138 06/11/2019     LIVER ENZYME RESULTS:  Lab Results   Component Value Date    AST 27 06/01/2024    AST 17 05/24/2019    ALT 12 06/01/2024    ALT 39  05/24/2019     CBC RESULTS:  Lab Results   Component Value Date    WBC 6.9 06/03/2024    WBC 8.0 05/03/2019    RBC 2.66 (L) 06/03/2024    RBC 4.84 05/03/2019    HGB 8.0 (L) 06/03/2024    HGB 14.2 05/03/2019    HCT 23.8 (L) 06/03/2024    HCT 45.1 05/03/2019    MCV 90 06/03/2024    MCV 93 05/03/2019    MCH 30.1 06/03/2024    MCH 29.3 05/03/2019    MCHC 33.6 06/03/2024    MCHC 31.5 05/03/2019    RDW 12.6 06/03/2024    RDW 12.3 05/03/2019     (L) 06/03/2024     05/03/2019     BMP RESULTS:  Lab Results   Component Value Date     06/03/2024     05/24/2019    POTASSIUM 3.4 06/03/2024    POTASSIUM 4.0 05/31/2024    POTASSIUM 4.4 05/24/2019    CHLORIDE 104 06/03/2024    CHLORIDE 103 01/07/2022    CHLORIDE 105 05/24/2019    CO2 27 06/03/2024    CO2 28 05/24/2019    ANIONGAP 9 06/03/2024    ANIONGAP 5 05/24/2019     (H) 06/03/2024     (H) 06/03/2024    GLC 91 05/24/2019    BUN 9.8 06/03/2024    BUN 20 05/24/2019    CR 0.72 06/03/2024    CR 0.88 05/24/2019    GFRESTIMATED >90 06/03/2024    GFRESTIMATED >90 05/24/2019    GFRESTBLACK >90 05/24/2019    HALEY 8.3 (L) 06/03/2024    HALEY 9.5 05/24/2019      A1C RESULTS:  Lab Results   Component Value Date    A1C 5.5 04/16/2024     CC  Aretha Arzate MD  [address] [place] [id number]    Please kindly note that this document was completed in part using Dragon voice recognition software. Although reviewed after completion, some word substitutions and typographical errors may occur. Please contact me if clarification is needed.       Thank you for allowing me to participate in the care of your patient.      Sincerely,     Zak Reardon NP     [facility]  cc:   Aretha Arzate MD  [address] [place] [id number]

## 2024-08-29 ENCOUNTER — LAB (OUTPATIENT)
Dept: LAB | Facility: CLINIC | Age: 59
End: 2024-08-29
Payer: COMMERCIAL

## 2024-08-29 DIAGNOSIS — Z98.890 HX OF ASCENDING AORTA REPAIR: ICD-10-CM

## 2024-08-29 DIAGNOSIS — Q23.81 BICUSPID AORTIC VALVE: ICD-10-CM

## 2024-08-29 DIAGNOSIS — I10 ESSENTIAL HYPERTENSION: ICD-10-CM

## 2024-08-29 DIAGNOSIS — I71.21 ANEURYSM OF ASCENDING AORTA WITHOUT RUPTURE (H): ICD-10-CM

## 2024-08-29 LAB
ANION GAP SERPL CALCULATED.3IONS-SCNC: 8 MMOL/L (ref 7–15)
BUN SERPL-MCNC: 16.7 MG/DL (ref 8–23)
CALCIUM SERPL-MCNC: 9.3 MG/DL (ref 8.8–10.4)
CHLORIDE SERPL-SCNC: 102 MMOL/L (ref 98–107)
CREAT SERPL-MCNC: 0.9 MG/DL (ref 0.67–1.17)
EGFRCR SERPLBLD CKD-EPI 2021: >90 ML/MIN/1.73M2
GLUCOSE SERPL-MCNC: 111 MG/DL (ref 70–99)
HCO3 SERPL-SCNC: 26 MMOL/L (ref 22–29)
POTASSIUM SERPL-SCNC: 4.5 MMOL/L (ref 3.4–5.3)
SODIUM SERPL-SCNC: 136 MMOL/L (ref 135–145)

## 2024-08-29 PROCEDURE — 80048 BASIC METABOLIC PNL TOTAL CA: CPT | Performed by: NURSE PRACTITIONER

## 2024-08-29 PROCEDURE — 36415 COLL VENOUS BLD VENIPUNCTURE: CPT | Performed by: NURSE PRACTITIONER

## 2024-09-16 ENCOUNTER — HOSPITAL ENCOUNTER (OUTPATIENT)
Dept: CARDIOLOGY | Facility: CLINIC | Age: 59
Discharge: HOME | End: 2024-09-16
Attending: NURSE PRACTITIONER | Admitting: NURSE PRACTITIONER
Payer: COMMERCIAL

## 2024-09-16 DIAGNOSIS — I71.21 ANEURYSM OF ASCENDING AORTA WITHOUT RUPTURE (H): ICD-10-CM

## 2024-09-16 DIAGNOSIS — Z98.890 HX OF ASCENDING AORTA REPAIR: ICD-10-CM

## 2024-09-16 DIAGNOSIS — I10 ESSENTIAL HYPERTENSION: ICD-10-CM

## 2024-09-16 DIAGNOSIS — Q23.81 BICUSPID AORTIC VALVE: ICD-10-CM

## 2024-09-16 LAB — LVEF ECHO: NORMAL

## 2024-09-16 PROCEDURE — 93306 TTE W/DOPPLER COMPLETE: CPT

## 2024-09-16 PROCEDURE — 93306 TTE W/DOPPLER COMPLETE: CPT | Mod: 26 | Performed by: INTERNAL MEDICINE

## 2024-09-18 ENCOUNTER — MYC MEDICAL ADVICE (OUTPATIENT)
Dept: CARDIOLOGY | Facility: CLINIC | Age: 59
End: 2024-09-18
Payer: COMMERCIAL

## 2024-09-18 DIAGNOSIS — Z98.890 HX OF ASCENDING AORTA REPAIR: Primary | ICD-10-CM

## 2024-09-18 DIAGNOSIS — Q23.81 BICUSPID AORTIC VALVE: ICD-10-CM

## 2024-09-18 DIAGNOSIS — I31.39 PERICARDIAL EFFUSION: ICD-10-CM

## 2024-09-19 NOTE — TELEPHONE ENCOUNTER
The pericardial effusion is stable in size with no significant change in comparison to the echo last month. I do not feel that the effusion is large enough to need to be tapped/drained and these typically resolve on their own with time. I have a message out to Dr. Arzate to get her input regarding recommendations for follow up testing or timing for another repeat echocardiogram. We can update him with any further recommendations once I hear back. Thank you!

## 2024-09-20 NOTE — TELEPHONE ENCOUNTER
Could you please update the patient that I have reviewed the follow-up echocardiogram findings with Dr. Arzate and she noted that it is somewhat unusual that the pericardial effusion has not resolved at this point, so would recommend getting a cardiac MRI for pericardial evaluation to assess this further and look for any inflammation. Thank you! CORNELIO Blankenship, CNP

## 2024-09-20 NOTE — TELEPHONE ENCOUNTER
Based on collaboration with Alonso Reardon and Dr Arzate, it is recommended that patient have cMRI.    It is somewhat unusual, lets get a cardiac MRI for pericardial evaluation. If inflamed, etc.    Aretha     Writer called and spoke with patient.  He says that he has had MRI in the past and is not claustrophobic with it.  He is happy to have the test if it can help figure out why slow resolution to pericardial effusion.    Post Aorta repair    8\6\24     Small pericardial effusion  Pericardial effusion up to 9-11mm. Minor respiratory mitral valve E velocity  variability not likely effusive/constrictive physiology.  9\16\24    Moderate anterior pericardial effusion. Small posterior pericardial effusion.  There are no echocardiographic indications of cardiac tamponade.  Effusion appears similar to the last echo from August 2024    Order entered.   Patient is not at home, he will call  on Monday to get apmnt.  He requested a [employer] message with recap of phone call.    Yesi Nuñez RN on 9/20/2024 at 3:42 PM

## 2024-09-25 ENCOUNTER — VIRTUAL VISIT (OUTPATIENT)
Dept: CARDIOLOGY | Facility: CLINIC | Age: 59
End: 2024-09-25
Attending: INTERNAL MEDICINE
Payer: COMMERCIAL

## 2024-09-25 VITALS — HEIGHT: 72 IN | WEIGHT: 170 LBS | BODY MASS INDEX: 23.03 KG/M2

## 2024-09-25 DIAGNOSIS — Z98.890 HX OF ASCENDING AORTA REPAIR: ICD-10-CM

## 2024-09-25 DIAGNOSIS — Q23.81 BICUSPID AORTIC VALVE: ICD-10-CM

## 2024-09-25 DIAGNOSIS — Z86.79 STATUS POST ASCENDING AORTIC ANEURYSM REPAIR: ICD-10-CM

## 2024-09-25 DIAGNOSIS — Z98.890 STATUS POST ASCENDING AORTIC ANEURYSM REPAIR: ICD-10-CM

## 2024-09-25 DIAGNOSIS — I71.21 ANEURYSM OF ASCENDING AORTA (H): Primary | ICD-10-CM

## 2024-09-25 ASSESSMENT — PAIN SCALES - GENERAL: PAINLEVEL: NO PAIN (0)

## 2024-09-25 NOTE — NURSING NOTE
Patient reviewed medications and allergies in Mychart during e-check in and said everything looked correct.      Current patient location: [address] [place] [id number]    Is the patient currently in the state of [place]? YES    Visit mode:VIDEO    If the visit is dropped, the patient can be reconnected by: VIDEO VISIT: Text to cell phone:   Telephone Information:   Mobile [phone]    and VIDEO VISIT: Send to e-mail at: [email or web address]    Will anyone else be joining the visit? NO  (If patient encounters technical issues they should call [phone]    How would you like to obtain your AVS? MyChart    Are changes needed to the allergy or medication list? Pt stated no med changes    Are refills needed on medications prescribed by this physician? NO    Rooming Documentation:  Not applicable    Reason for visit: Consult      Keiko FUNES

## 2024-09-25 NOTE — PROGRESS NOTES
"Virtual Visit Details    Type of service:  Video Visit     Originating Location (pt. Location): Home  Distant Location (provider location):  On-site  Platform used for Video Visit: Zoom (Telehealth)      Here is a copy of the progress note from your recent genetic counseling visit [facility] on Date: 2024.    PROGRESS NOTE: Robson was referred by Aretha Arzate MD for genetic counseling due to his history of bicuspid aortic valve with thoracic aortic aneurysm .  I had the opportunity to talk with Robson today to discuss the genetic component of TAAD and testing options available to him.     MEDICAL HISTORY: Robson is a 59 year-old male with a history of HTN and bicuspid aortic valve with TAA (5.0 cm on previous MRAs) post operative valve sparing aortic root repair for ascending aortic aneurysm.     Due to the association between aortic aneurysms and connective tissue disorders, we also discussed related symptoms.  Robson states that he is 6'0\" tall and has these associated symptoms: near sighted, sternum excavatum, some hypermobility, and aortic aneurysm. He denies any history of lens dislocation, scoliosis, pneumothorax, flat feet, poor wound healing, easy bruising, scarring, hernia, and bifid uvula.    Patient also has a history of glaucoma.    FAMILY HISTORY:A detailed family history was obtained today and was significant for the following cardiac history:    Two sons, 34 and 30, in good health and have had normal echos.  Mother,  at 85 in her sleep from suspected basilar artery aneurysm. She had a history of HTN.   Maternal cousin who  at 4yo, thought to be from hepatitis.  Maternal grandmother  suddenly at 74 from an unknown cause.  Two brothers, 65 and 53, who are in good health. Robson does not know if they've had cardiac screening.      There is no additional history of cardiomyopathy, arrhythmias, heart attacks, fainting, sudden cardiac death, " genetic conditions, or birth defects. (Scanned pedigree may be under media tab)    DISCUSSION:    Explained that aortic aneurysms often have a hereditary component and are less likely to be the result of sporadic events. Hereditary thoracic aortic disease (HTAD) can encompass both genetic syndromes, such as Marfan and Loeys-April syndrome or isolated event that runs in families, thoracic aortic aneurysm and dissections (TAAD). Patients with a bicuspid aortic valve are at an increased risk to develop thoracic aortic aneurysms. It is unclear whether Robson's aortic aneurysm was caused by his BAV or if there are other genetic factors playing a role.    Discussed genetics of bicuspid aortic valve (BAV). BAV is a heterogeneous disorder that is primarily inherited in an autosomal dominant pattern with incomplete penetrance and variable expressivity. BAV is a feature of some genetic syndromes, such as Loeys-April, as well as complex congenital heart defects. In non-syndromic cases, BAV inheritance is best explained by complex genetics involving many different interacting genes. Genetic testing for non-syndromic BAV has a low yield due to the many different genes interacting together which makes testing difficult. Discussed that it is recommended that first-degree relatives (children, siblings, parents) undergo cardiac screening for BAV.    We reviewed autosomal dominant (AD) inheritance associated with inherited forms of HTAD and BAV including the 50% risk for recurrence. Explained reduced penetrance and variable expressivity, meaning that individuals who carry a gene mutation may or may not get the disease and onset and severity can vary from one family member to the next. This explains why you may not see HTAD each generation of a family.    Genetic testing is indicated for individuals with thoracic aortic disease to identify the causative genetic mutation, identify asymptomatic family members who may be at risk, and guide  the optimal timing of preventative surgery in those with confirmed genetic aortopathy. Genetic testing can facilitate personalized aortic care tailored to an individual s specific genetic abnormality, with the aim of mitigating the significant morbidity burden and premature mortality associated with HTAD (PMID: 13835667).     Genetic testing that includes a panel of genes is the most cost effective and timely approach. Reviewed capabilities, limitations, and logistics of testing. The testing panel includes genes associated with both syndromic and isolated aortic aneurysms. Testing currently identifies mutations in about 20% of individuals with TAAD. DNA sample via saliva or blood is collected and sent to testing lab for evaluation of selected genes. The results could directly impact care and treatment. This testing is medically necessary.     Explained three possible outcomes of genetic testing including: positive identification of a mutation, no mutation identified, and identification of a variant of unknown significance (VUS). If a mutation is identified, presymptomatic testing would be available to at risk family members. If no mutation is identified, it does not rule out the possibility of a genetic component to this disease. Family members could still be at risk for developing the same condition. If a VUS is identified, it is unclear if the mutation is disease causing or just a normal variation. It may take time and possibly additional testing to determine the meaning of a VUS result.     Test results take approximately 2-4 weeks on average. Discussed cost of testing through commercial lab. Explained that the lab will work with insurance to determine coverage and contact patient if out of pocket costs are expected to exceed $100. If so, patient has the option to pay reported price, cancel testing or elect self pay pricing (typcially around $250).     Discussed pros and cons of genetic testing. Explained that  results could determine the cause for aortic aneurysm. If a mutation is identified, presymptomatic testing is available to all at risk relatives. Reviewed possible issues associated with presymptomatic testing including genetic discrimination, current laws to prevent discrimination (ie. LEIDY), insurance issues, and emotional and psychosocial outcomes of testing.     Recommend that first degree relatives, including parents, children, and siblings, be screened for aortic aneurysms.    All questions answered at this time.      PLAN:Robson elected to proceed with genetic testing.  Requisition was completed and verbal consent was obtained, due to virtual visit.  A saliva sample kit will be sent directly to the patient.  Once sample is collected, kit will be mailed to [facility] laboratory.  I will contact patient when results are available.     TOTAL TIME SPENT IN COUNSELIN minutes    Anastasiya Philip MS, [facility]  Licensed Genetic Counselor  [facility]  [facility]     Attestation: Reviewed information with Anastasiya prior to appointment and after primary discussion.  Agree with plan going forward.  All patient questions were addressed at this time.     Ashleigh Pritchett MS, [facility]  Licensed, Certified Genetic Counselor  [facility]  [facility]

## 2024-09-25 NOTE — LETTER
"2024      RE: Robson Gutierrez  [address]  [facility] [address]       Dear Colleague,    Thank you for the opportunity to participate in the care of your patient, Robson Gutierrez, at the [facility] [place] [facility]. Please see a copy of my visit note below.    Virtual Visit Details    Type of service:  Video Visit     Originating Location (pt. Location): Home  Distant Location (provider location):  On-site  Platform used for Video Visit: Zoom (Telehealth)      Here is a copy of the progress note from your recent genetic counseling visit [facility] on Date: 2024.    PROGRESS NOTE: Robson was referred by Aretha Arzate MD for genetic counseling due to his history of bicuspid aortic valve with thoracic aortic aneurysm .  I had the opportunity to talk with Robson today to discuss the genetic component of TAAD and testing options available to him.     MEDICAL HISTORY: Robson is a 59 year-old male with a history of HTN and bicuspid aortic valve with TAA (5.0 cm on previous MRAs) post operative valve sparing aortic root repair for ascending aortic aneurysm.     Due to the association between aortic aneurysms and connective tissue disorders, we also discussed related symptoms.  Robson states that he is 6'0\" tall and has these associated symptoms: near sighted, sternum excavatum, some hypermobility, and aortic aneurysm. He denies any history of lens dislocation, scoliosis, pneumothorax, flat feet, poor wound healing, easy bruising, scarring, hernia, and bifid uvula.    Patient also has a history of glaucoma.    FAMILY HISTORY:A detailed family history was obtained today and was significant for the following cardiac history:    Two sons, 34 and 30, in good health and have had normal echos.  Mother,  at 85 in her sleep from suspected basilar artery aneurysm. She had a history of HTN.   Maternal " cousin who  at 2yo, thought to be from hepatitis.  Maternal grandmother  suddenly at 74 from an unknown cause.  Two brothers, 65 and 53, who are in good health. Robson does not know if they've had cardiac screening.      There is no additional history of cardiomyopathy, arrhythmias, heart attacks, fainting, sudden cardiac death, genetic conditions, or birth defects. (Scanned pedigree may be under media tab)    DISCUSSION:    Explained that aortic aneurysms often have a hereditary component and are less likely to be the result of sporadic events. Hereditary thoracic aortic disease (HTAD) can encompass both genetic syndromes, such as Marfan and Loeys-April syndrome or isolated event that runs in families, thoracic aortic aneurysm and dissections (TAAD). Patients with a bicuspid aortic valve are at an increased risk to develop thoracic aortic aneurysms. It is unclear whether Robson's aortic aneurysm was caused by his BAV or if there are other genetic factors playing a role.    Discussed genetics of bicuspid aortic valve (BAV). BAV is a heterogeneous disorder that is primarily inherited in an autosomal dominant pattern with incomplete penetrance and variable expressivity. BAV is a feature of some genetic syndromes, such as Loeys-April, as well as complex congenital heart defects. In non-syndromic cases, BAV inheritance is best explained by complex genetics involving many different interacting genes. Genetic testing for non-syndromic BAV has a low yield due to the many different genes interacting together which makes testing difficult. Discussed that it is recommended that first-degree relatives (children, siblings, parents) undergo cardiac screening for BAV.    We reviewed autosomal dominant (AD) inheritance associated with inherited forms of HTAD and BAV including the 50% risk for recurrence. Explained reduced penetrance and variable expressivity, meaning that individuals who carry a gene mutation may or may not  get the disease and onset and severity can vary from one family member to the next. This explains why you may not see HTAD each generation of a family.    Genetic testing is indicated for individuals with thoracic aortic disease to identify the causative genetic mutation, identify asymptomatic family members who may be at risk, and guide the optimal timing of preventative surgery in those with confirmed genetic aortopathy. Genetic testing can facilitate personalized aortic care tailored to an individual s specific genetic abnormality, with the aim of mitigating the significant morbidity burden and premature mortality associated with HTAD (PMID: 67724958).     Genetic testing that includes a panel of genes is the most cost effective and timely approach. Reviewed capabilities, limitations, and logistics of testing. The testing panel includes genes associated with both syndromic and isolated aortic aneurysms. Testing currently identifies mutations in about 20% of individuals with TAAD. DNA sample via saliva or blood is collected and sent to testing lab for evaluation of selected genes. The results could directly impact care and treatment. This testing is medically necessary.     Explained three possible outcomes of genetic testing including: positive identification of a mutation, no mutation identified, and identification of a variant of unknown significance (VUS). If a mutation is identified, presymptomatic testing would be available to at risk family members. If no mutation is identified, it does not rule out the possibility of a genetic component to this disease. Family members could still be at risk for developing the same condition. If a VUS is identified, it is unclear if the mutation is disease causing or just a normal variation. It may take time and possibly additional testing to determine the meaning of a VUS result.     Test results take approximately 2-4 weeks on average. Discussed cost of testing through  commercial lab. Explained that the lab will work with insurance to determine coverage and contact patient if out of pocket costs are expected to exceed $100. If so, patient has the option to pay reported price, cancel testing or elect self pay pricing (typcially around $250).     Discussed pros and cons of genetic testing. Explained that results could determine the cause for aortic aneurysm. If a mutation is identified, presymptomatic testing is available to all at risk relatives. Reviewed possible issues associated with presymptomatic testing including genetic discrimination, current laws to prevent discrimination (ie. LEIDY), insurance issues, and emotional and psychosocial outcomes of testing.     Recommend that first degree relatives, including parents, children, and siblings, be screened for aortic aneurysms.    All questions answered at this time.      PLAN:Robson elected to proceed with genetic testing.  Requisition was completed and verbal consent was obtained, due to virtual visit.  A saliva sample kit will be sent directly to the patient.  Once sample is collected, kit will be mailed to [facility] laboratory.  I will contact patient when results are available.     TOTAL TIME SPENT IN COUNSELIN minutes    Anastasiya Philip MS, [facility]  Licensed Genetic Counselor  [facility]  [facility]     Attestation: Reviewed information with Anastasiya prior to appointment and after primary discussion.  Agree with plan going forward.  All patient questions were addressed at this time.     Ashleigh Pritchett MS, [facility]  Licensed, Certified Genetic Counselor  [facility]  [facility]         Please do not hesitate to contact me if you have any questions/concerns.     Sincerely,     Ashleigh Pritchett GC

## 2024-09-25 NOTE — PATIENT INSTRUCTIONS
"Indication for Genetic Counseling:     Aortic dilation/aneurysm occur when a portion of the aorta, a large blood vessel in the heart, becomes enlarged due to weakening of the artery wall.  Depending on the size of the aneurysm, there may be a risk for the aneurysm to break open and rupture, also known as an aortic dissection.  Aortic aneurysms can be caused by disruptions or mutations in a number of genes, which can be inherited within families.  Aortic aneurysms can be the only symptom caused by the gene disruption, such as in the genetic condition, hereditary thoracic aortic disease (HTAD).  Aortic aneurysms can also occur as part of a syndrome that includes other symptoms or physical features.  These syndromes are known as connective tissue disorders because the genes involved make connective tissue proteins found in joints, organs, and blood vessels.  One of these syndromes is called Marfan syndrome.  In addition to aortic aneurysms, other features include tall stature, slender limbs, long arm span, scoliosis, and lens dislocation.  Examples of two other connective tissue disorders are Loeys-April syndrome and Samara-Danlos syndrome.     Inheritance:   Humans have over 20,000 genes that instruct our bodies how to function.  We have two copies of each gene because we inherit one from our mother and one from our father.  In most cardiac cases with a genetic component, the condition is inherited in an autosomal dominant (AD) pattern.  This means that in order to have the condition, a person needs to inherit a mutation on one copy of a particular gene.  This mutation or pathogenic variant dominates the \"normal\" working copy of the gene.  When an affected individual has children, they can either pass on the \"normal\" copy of the gene or the mutation.  Therefore, children have a 50% chance of inheriting the mutation.  Other family members also have an increased risk but the specific risk depends on the degree of " Message to Dr. Zamora.    relationship.  Additional inheritance patterns can occur within families and may alter the risk of recurrence.     Testing Options:   Genetic testing is available to assess a panel of genes known to cause this condition.  This test reads through the DNA (sequencing) of these genes to look for spelling mistakes or mutations that could cause the condition.      There are three types of results you could receive from this test.     -Positive result (mutation/pathogenic variant identified) - confirms diagnosis and provides an answer to why this happened.  In addition, identifying a mutation allows family members to have testing to determine their risk.     -Negative result (mutation not identified) - no genetic changes were identified.  This does not rule out a genetic cause for the condition as the genetic testing only identifies 20-30% of genetic causes for this condition.    -Variant of uncertain significance (VUS) - a genetic change was identified, but there is not enough information to determine whether it is disease-causing or normal human genetic variation.     Although genetic testing may identify a mutation, it cannot provide information about the severity of symptoms or the progression of disease.  We cannot predict age of onset or severity of symptoms due to reduced penetrance and variable expressivity.    Logistics:   Genetic testing involves collecting a sample of DNA, thru blood, saliva, or cheek cells. The sample will be sent to a laboratory to extract the DNA and sequence the genes for mutations.  The laboratory will work with your insurance company to determine the out of pocket (OOP) cost and will notify you if the OOP cost is greater than $100.  Remember to ask the lab about financial assistance pricing and self pay options as well.  Sometimes those are much lower than insurance pricing.  When testing is initiated, results take about 2-4 weeks to return. I will contact you over the phone when results  are available.     Genetic Information and Nondiscrimination Act:  The Genetic Information and Nondiscrimination Act of 2008 (LEIDY) is a federal law that protects individuals from genetic discrimination in health insurance and employment. Genetic discrimination is defined as the misuse of genetic information. This law does not address potential discrimination regarding life insurance or disability insurance.      This is especially relevant for at risk individuals who are considering presymptomatic testing.    Screening Recommendations:  Recommend that first degree relatives, including parents,siblings and children, be screened for aortic aneurysms. If there is an underlying genetic syndrome, clinical evaluation with a medical geneticist may also be recommended.    Resources:  The Marfan Foundation - marfan.org  The Nahun Ritter Research Program in Aortic and Vascular Diseases - johnritterresearchprogram.org  Aortic Hope - aortichope.org    General   American Heart Association - americanheart.org  Genetics Home Reference - ghr.nlm.nih.gov  Genetic Information and Nondiscrimination Act - ginahelp.org    Contact Information:  Anastasiya Philip MS, [facility]  Licensed, Certified Genetic Counselor  [facility]  [facility] [place]     Ashleigh Pritchett MS, [facility]  Licensed, Certified Genetic Counselor  [facility]  [facility] [place]     Office: [phone]  [phone]  Fax: [phone]  Email:   [email or web address]

## 2024-10-09 ENCOUNTER — TELEPHONE (OUTPATIENT)
Dept: CARDIOLOGY | Facility: CLINIC | Age: 59
End: 2024-10-09
Payer: COMMERCIAL

## 2024-10-09 NOTE — TELEPHONE ENCOUNTER
Writer notes that MRI is scheduled for 10\22, but cannot tell if insurance has authorized.    Routing to PA team.    Yesi Nuñez RN on 10/9/2024 at 8:29 AM

## 2024-10-22 ENCOUNTER — HOSPITAL ENCOUNTER (OUTPATIENT)
Dept: CARDIOLOGY | Facility: CLINIC | Age: 59
Discharge: HOME | End: 2024-10-22
Attending: NURSE PRACTITIONER | Admitting: NURSE PRACTITIONER
Payer: COMMERCIAL

## 2024-10-22 VITALS — DIASTOLIC BLOOD PRESSURE: 89 MMHG | SYSTOLIC BLOOD PRESSURE: 121 MMHG | HEART RATE: 86 BPM

## 2024-10-22 DIAGNOSIS — Z98.890 HX OF ASCENDING AORTA REPAIR: ICD-10-CM

## 2024-10-22 DIAGNOSIS — I31.39 PERICARDIAL EFFUSION: ICD-10-CM

## 2024-10-22 DIAGNOSIS — Q23.81 BICUSPID AORTIC VALVE: ICD-10-CM

## 2024-10-22 PROCEDURE — A9585 GADOBUTROL INJECTION: HCPCS | Performed by: NURSE PRACTITIONER

## 2024-10-22 PROCEDURE — 255N000002 HC RX 255 OP 636: Performed by: NURSE PRACTITIONER

## 2024-10-22 PROCEDURE — 75561 CARDIAC MRI FOR MORPH W/DYE: CPT | Mod: 26 | Performed by: INTERNAL MEDICINE

## 2024-10-22 PROCEDURE — 75561 CARDIAC MRI FOR MORPH W/DYE: CPT

## 2024-10-22 RX ORDER — DIPHENHYDRAMINE HCL 25 MG
25 CAPSULE ORAL
Status: DISCONTINUED | OUTPATIENT
Start: 2024-10-22 | End: 2024-10-23 | Stop reason: HOSPADM

## 2024-10-22 RX ORDER — DIAZEPAM 5 MG/1
5 TABLET ORAL EVERY 30 MIN PRN
Status: DISCONTINUED | OUTPATIENT
Start: 2024-10-22 | End: 2024-10-23 | Stop reason: HOSPADM

## 2024-10-22 RX ORDER — LORAZEPAM 0.5 MG/1
0.5 TABLET ORAL EVERY 30 MIN PRN
Status: DISCONTINUED | OUTPATIENT
Start: 2024-10-22 | End: 2024-10-23 | Stop reason: HOSPADM

## 2024-10-22 RX ORDER — LIDOCAINE 40 MG/G
CREAM TOPICAL
Status: DISCONTINUED | OUTPATIENT
Start: 2024-10-22 | End: 2024-10-23 | Stop reason: HOSPADM

## 2024-10-22 RX ORDER — METHYLPREDNISOLONE SODIUM SUCCINATE 125 MG/2ML
125 INJECTION INTRAMUSCULAR; INTRAVENOUS
Status: DISCONTINUED | OUTPATIENT
Start: 2024-10-22 | End: 2024-10-23 | Stop reason: HOSPADM

## 2024-10-22 RX ORDER — DIPHENHYDRAMINE HYDROCHLORIDE 50 MG/ML
25-50 INJECTION INTRAMUSCULAR; INTRAVENOUS
Status: DISCONTINUED | OUTPATIENT
Start: 2024-10-22 | End: 2024-10-23 | Stop reason: HOSPADM

## 2024-10-22 RX ORDER — GADOBUTROL 604.72 MG/ML
10 INJECTION INTRAVENOUS ONCE
Status: COMPLETED | OUTPATIENT
Start: 2024-10-22 | End: 2024-10-22

## 2024-10-22 RX ORDER — NITROGLYCERIN 0.4 MG/1
0.4 TABLET SUBLINGUAL EVERY 5 MIN PRN
Status: ACTIVE | OUTPATIENT
Start: 2024-10-22 | End: 2024-10-22

## 2024-10-22 RX ADMIN — GADOBUTROL 10 ML: 604.72 INJECTION INTRAVENOUS at 12:11

## 2024-10-23 ENCOUNTER — TELEPHONE (OUTPATIENT)
Facility: CLINIC | Age: 59
End: 2024-10-23
Payer: COMMERCIAL

## 2024-10-23 DIAGNOSIS — I31.9 PERICARDITIS, UNSPECIFIED CHRONICITY, UNSPECIFIED TYPE: ICD-10-CM

## 2024-10-23 DIAGNOSIS — I71.21 ANEURYSM OF ASCENDING AORTA WITHOUT RUPTURE (H): ICD-10-CM

## 2024-10-23 DIAGNOSIS — Q23.1 CONGENITAL INSUFFICIENCY OF AORTIC VALVE: ICD-10-CM

## 2024-10-23 DIAGNOSIS — Z95.828 S/P ASCENDING AORTIC REPLACEMENT: Primary | ICD-10-CM

## 2024-10-23 RX ORDER — METOPROLOL SUCCINATE 25 MG/1
25 TABLET, EXTENDED RELEASE ORAL DAILY
Qty: 90 TABLET | Refills: 3 | Status: SHIPPED | OUTPATIENT
Start: 2024-10-23

## 2024-10-23 RX ORDER — COLCHICINE 0.6 MG/1
0.6 TABLET ORAL 2 TIMES DAILY
Qty: 180 TABLET | Refills: 0 | Status: SHIPPED | OUTPATIENT
Start: 2024-10-23 | End: 2025-01-21

## 2024-10-23 NOTE — TELEPHONE ENCOUNTER
Patient called and notified of message.   No further questions  Will plan on  having labs checked tomorrow and start colchicine following labs.   He already took carvedilol today, will not take tomorrow and instead start metoprolol    Fadi Hsu RN on 10/23/2024 at 4:18 PM

## 2024-10-23 NOTE — TELEPHONE ENCOUNTER
Please update the patient that colchicine has an interaction with carvedilol, so recommend stopping carvedilol and instead switching to metoprolol 25 mg once daily. Would consider cutting back on playing [employer] ball or decreasing his intensity when he is playing for now. He can do lighter activity like going for walks regularly but recommendation is to avoid more strenuous physical activity/exercise or things like running or higher intensity training for around 3 months to help decrease the inflammation of the pericardium and allow this to heal, which the colchicine should help with as well. Possible that the sweating could be secondary to the inflammation/pericarditis.     Thank you!    Alonso Reardon, CORNELIO, CNP

## 2024-10-23 NOTE — TELEPHONE ENCOUNTER
Spoke with patient.   Informed of MRI results and plans for labs and colchicine.   Agreeable and thankful for plan.    Of note, he does state that following surgery (5/31/24) has  awoken in middle of night with perfuse sweating on chest. Last 2-3 months has been slightly better, but still needing to change his shirt nightly and at a times sheet due to sweating. Only on chest per patient. No fever. No other sx. Room and bedding not heavy or new.   -Did not have prior to surgery. Patient unsure if related to inflammation or surgical concern.    Also, patient plays pickle ball. 1-1.5 hours 3x per day. Should he refrain from playing or cut down his time playing while on colchicine?     Lastly, there is a flag/warning with colchicine. Please review and sign if still approved.     Thank you,   Fadi BARBOSA RN

## 2024-10-24 ENCOUNTER — TELEPHONE (OUTPATIENT)
Facility: CLINIC | Age: 59
End: 2024-10-24
Payer: COMMERCIAL

## 2024-10-24 NOTE — TELEPHONE ENCOUNTER
Patient had labs drawn at outside facility.   CRP below. Awaiting ESR     Patient starting colchicine today  To provider as BECCA Hsu RN on 10/24/2024 at 3:41 PM

## 2024-10-24 NOTE — TELEPHONE ENCOUNTER
Thanks for the update! Please let him know that the CRP is minimally elevated so can just do the colchicine as planned. Since not significantly elevated and no chest pain do not need to add ibuprofen.

## 2024-11-06 ENCOUNTER — TELEPHONE (OUTPATIENT)
Dept: CARDIOLOGY | Facility: CLINIC | Age: 59
End: 2024-11-06
Payer: COMMERCIAL

## 2024-11-06 NOTE — TELEPHONE ENCOUNTER
Spoke with Robson today to review results of genetic testing. He underwent genetic testing for the TAAD panel. DNA was collected via saliva on 9/25/24 and sent to [facility] laboratory.   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 [id number]).  A variant of unknown significance (VUS) means that there is a genetic change in which we do not have enough information to determine if it is disease causing or not. Labs review published data, functional studies, presences in population databases, similarity to normal sequence, and computer prediction models.    The COL1A2 gene is known to be associated with a spectrum of conditions, including GEZ2H1-vsvnkxu osteogenesis imperfecta (OI), KSP2M6-nsmvwcb overlap disorder, and WOZ8I8-qdyzeel arthrochalasia type Samara-Danlos syndrome (aEDS), which are all inherited in an autosomal dominant fashion, and cardiac valvular type EDS (cvEDS), which is inherited in an autosomal recessive fashion. This particular variant occurs in a position that is highly conserved (meaning that it is not used to changes) and creates a duplication of the amino acid tyrosine.This variant is not found in population databases. Although suspicion is raised, there is not enough current information to be certain if this variant alone can cause disease or not. Therefore, based on the current information it is unclear if this VUS is associated with disease or not.    Risk to relatives could still be as high as 50% but genetic testing is not predictive or recommended because we cannot interpret the results and make predictions.    Reviewed recommendation for cardiac screening in all first degree relatives (parents, siblings, children).  Clinical screening should include physical exam with a cardiologist, history, EKG, and ECHO. In addition, Holter montior and MRI may be recommended.  If other family members are found to have TAAD, genetic testing through  [facility] study program could be performed to help better understand this variant.  A summary letter and copy of the results will be sent to patient. All questions answered at this time.  Anastasiya Philip MS, [facility] [place]  Licensed Genetic Counselor  [facility]  [facility]

## 2024-11-18 ENCOUNTER — MYC MEDICAL ADVICE (OUTPATIENT)
Dept: CARDIOLOGY | Facility: CLINIC | Age: 59
End: 2024-11-18
Payer: COMMERCIAL

## 2024-11-18 NOTE — TELEPHONE ENCOUNTER
Yes, okay to order lab recheck for CRP inflammation and ESR. Would suggest checking this prior to his upcoming visit with Dr. Arzate in February once he is closer to the 3 month dimas after starting on colchicine. Thank you!

## 2024-11-18 NOTE — TELEPHONE ENCOUNTER
Cardiac MRI showed pericardial inflammation  Robson has been taking colchicine 0.6 mg and avoiding strenuous exercise    Labs drawn last 10/24:  CRP = 0.9  ESR = 28    Patient is requesting recheck of labs for continued monitoring. Please advise if okay to order.     Thank you,   Fadi BARBOSA RN Care Coordinator  [facility]

## 2024-11-19 NOTE — TELEPHONE ENCOUNTER
Orders faxed for CRP and ESR to [phone]  Patient notified    Fadi Hsu RN on 11/19/2024 at 7:54 AM

## 2024-11-20 ENCOUNTER — TELEPHONE (OUTPATIENT)
Dept: CARDIOLOGY | Facility: CLINIC | Age: 59
End: 2024-11-20
Payer: COMMERCIAL

## 2024-11-20 NOTE — TELEPHONE ENCOUNTER
Different labs have slightly different guidelines for CRP levels and there are 2 different types of CRP levels (standard CRP and high-sensitivity CRP).  It looks like one of his labs was a standard check and the second was high-sensitivity check so it can be challenging to interpret differences and compare these levels. His ESR level is trending down and appears CRP is only modestly elevated. As long as he is not having symptoms, we do not need to change the plan at all and can continue the 3 month course for colchicine to get that inflammation down. Thank you!

## 2024-11-20 NOTE — TELEPHONE ENCOUNTER
Spoke with patient on phone and discussed labs   Reassured him and offered recheck of standard CRP  Declined recheck.   Thankful for provider's response    Fadi Hsu RN on 11/20/2024 at 4:07 PM

## 2024-11-20 NOTE — TELEPHONE ENCOUNTER
Team 3 received fax regarding outside [place] lab results.    CRP Inflammation     14    on 11\19\24    Routing to Alonso Reardon for review    Yesi Nuñez RN on 11/20/2024 at 1:03 PM

## 2024-11-20 NOTE — TELEPHONE ENCOUNTER
Labs drawn yesterday.  On colchicine for pericarditis.   Patient advised to refrain from strenuous activity until follow-up with Dr. Arzate in February. Of note, patient was seen by outside provider to discuss plan and activity. Was okay'd to increase activity slightly, patient has not done so. See mychart correspondences.     Patient requesting monthly CRP and ESR to track trend. Had lab drawn yesterday- copied and pasted results- below     CRP notably up from previous test 3 weeks ago (0.9) now 13.2    Patient was planning to fly and go on a hiking trip in [place] next week.     1. Please advise if any changes needed with new lab values- specifically CRP.     2. Okay to fly and do walking in [place]- with recommendation of refrain from hiking if strenuous and/or steep inclines or elevation in HR >100?     Thank you!  Fadi

## 2025-02-18 ENCOUNTER — LAB (OUTPATIENT)
Dept: LAB | Facility: CLINIC | Age: 60
End: 2025-02-18
Payer: COMMERCIAL

## 2025-02-18 ENCOUNTER — OFFICE VISIT (OUTPATIENT)
Dept: CARDIOLOGY | Facility: CLINIC | Age: 60
End: 2025-02-18
Attending: NURSE PRACTITIONER
Payer: COMMERCIAL

## 2025-02-18 DIAGNOSIS — Q23.81 BICUSPID AORTIC VALVE: ICD-10-CM

## 2025-02-18 DIAGNOSIS — Z98.890 HX OF ASCENDING AORTA REPAIR: ICD-10-CM

## 2025-02-18 DIAGNOSIS — I30.0 IDIOPATHIC PERICARDITIS, UNSPECIFIED CHRONICITY: Primary | ICD-10-CM

## 2025-02-18 DIAGNOSIS — I10 ESSENTIAL HYPERTENSION: ICD-10-CM

## 2025-02-18 DIAGNOSIS — I30.0 IDIOPATHIC PERICARDITIS, UNSPECIFIED CHRONICITY: ICD-10-CM

## 2025-02-18 DIAGNOSIS — I71.21 ANEURYSM OF ASCENDING AORTA WITHOUT RUPTURE: ICD-10-CM

## 2025-02-18 LAB
CRP SERPL-MCNC: <3 MG/L
ERYTHROCYTE [SEDIMENTATION RATE] IN BLOOD BY WESTERGREN METHOD: 18 MM/HR (ref 0–20)

## 2025-02-18 PROCEDURE — 99214 OFFICE O/P EST MOD 30 MIN: CPT | Performed by: INTERNAL MEDICINE

## 2025-02-18 PROCEDURE — 85652 RBC SED RATE AUTOMATED: CPT

## 2025-02-18 PROCEDURE — G2211 COMPLEX E/M VISIT ADD ON: HCPCS | Performed by: INTERNAL MEDICINE

## 2025-02-18 PROCEDURE — 36415 COLL VENOUS BLD VENIPUNCTURE: CPT

## 2025-02-18 PROCEDURE — 86140 C-REACTIVE PROTEIN: CPT

## 2025-02-18 NOTE — LETTER
2/18/2025    Lidia Garcia MD  [facility] [address]  [facility] [address]    RE: Robson Gutierrez       Dear Colleague,     I had the pleasure of seeing Robson Gutierrez in the [facility].           Vascular Cardiology       HPI:    This is a 59 year old male with a PMHx of HTN, Bicuspid Aortic Valve with TAA (5.0 cm on previous MRAs) who is here for follow up. He is s/p valve sparing aortic root repair for ascending aortic aneurysm. He underwent ascending aorta replacement with 30 mm Hemashield interposition graft by Dr. Torres on 5/31/24. Post-operatively he had atrial flutter which was self limited, but also a pericardial effusion that persisted.      Initial history: I had been following Robson for years. His MRA had showed stability of aorta for years, until scan in 2023 demonstrated new aortic growth. 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    ROS was negative for phenotypic features of syndromic TAAD except he had pectus deformity and some hypermobility. 6'0 feet tall. No flat feet. He underwent cardiac rehab for 3 weeks and did well. However heart rates were still elevated so post operative imaging performed demonstrating a pericardial effusion and CMR showed pericardial thickening. In past, he had a brief syncopal episode prompting an event monitor which demonstrated no sustained arrhythmias, no inappropriate sinus tachycardia, no afib and only an isolated episode of SVT (7 beats). Lowest HR was 46 bpm, no pauses. He had note of an IVCD/BBB but not present on EKG. No recurrent episodes.     He was placed on 3 month colchicine dosing and NSAID taper and did well, he still feels slightly deconditioned however and heart rates elevated, not back to full physical capacity. He denies chest pain,  fevers, chills, ns, leg swelling, orthopnea, PND. No syncope. No recurrent AF/AFL that he knows of.     Of note recently quit job at the [employer] and works now for industry.        Assessment and Plan:      59 year old male with a PMHx of HTN, Bicupsid Aortic Valve with TAA (4.9 cm on MRA with slight growth on last MRA) who is now post operative ascending aortic repair, c/b atrial flutter self limited.  He underwent ascending aorta replacement with 30 mm Hemashield interposition graft by Dr. Torres on 5/31/24. Post-operatively he had atrial flutter which was self limited. Also with post pericardiectomy syndrome / pericarditis.      #Thoracic Aortic Aneurysm s/p 30 mm Hemashield graft 5/31/2024   -hereditary subtype, with high risk features including fam history of aneurysm disease but no h/o SCD or dissection.   -he did experience growth on this last scan therefore in shared decision making approach discussed surgery while his STS was low.   -genetic testing completed  -continue aspirin      #Bicupsid Aortic Valve  -Fusion of RCC and LCC   -No evidence of stenosis of regurgitation on ECHO  -Echo surveillance for valve function post operatively  -Children have completed screening, no BAV  -if valve degenerates over time will be candidate for TAVR most likely     #Elevated LDL and carotid plaque < 50% on L side  - Started lipitor 20 mg per day.  - Lipid panel and LFT surveillance per PCP     #Syncope:  -Vasovagal mediated, event monitor unremarkable for malignant arrhythmias or bradycardia  -Discussed autonomic dysfunction is very common in vascular CTD and behavioral modifications are often necessary, especially when on a beta blocker for aortic protection.      #Tachycardia:   -still with mildly elevated heart rates, echo with pericardial effusion likely due to post operative pericarditis  -checked CMR, confirmed pericardial thickening  -inflammatory markers today   -recheck echocardiogram for pericardial effusion  resolution, if still ongoing and/or inflammatory markers are elevated would like to do repeat CMR and consider additional pericardial treatment     Aretha Arzate MD MSC     The longitudinal plan of care for the diagnosis(es)/condition(s) as documented were addressed during this visit. Due to the added complexity in care, I will continue to support Robson in the subsequent management and with ongoing continuity of care.    Labs and echo images personally reviewed by me.    PAST MEDICAL HISTORY  Past Medical History:   Diagnosis Date     Aortic aneurysm      Ascending aortic aneurysm 03/01/2018    4.7 cm on echo     Bicuspid aortic valve      Congenital insufficiency of aortic valve      Glaucoma      HTN (hypertension)      Pectus excavatum        CURRENT MEDICATIONS  Current Outpatient Medications   Medication Sig Dispense Refill     amLODIPine (NORVASC) 5 MG tablet Take 1 tablet (5 mg) by mouth daily. 90 tablet 3     dorzolamide-timolol (COSOPT) 22.3-6.8 MG/ML ophthalmic solution Place 1 drop into both eyes 2 times daily       latanoprost (XALATAN) 0.005 % ophthalmic solution Place 1 drop into both eyes every evening       metoprolol succinate ER (TOPROL XL) 50 MG 24 hr tablet Take 1 tablet (50 mg) by mouth daily. 90 tablet 1       PAST SURGICAL HISTORY:  Past Surgical History:   Procedure Laterality Date     CV CORONARY ANGIOGRAM N/A 4/16/2024    Procedure: Coronary Angiogram;  Surgeon: Zach Suárez MD;  Location:  HEART CARDIAC CATH LAB     CV LEFT HEART CATH N/A 4/16/2024    Procedure: Left Heart Catheterization;  Surgeon: Zach Suárez MD;  Location:  HEART CARDIAC CATH LAB     REPAIR ANEURYSM ASCENDING AORTA N/A 5/31/2024    Procedure: ASCENDING AORTA ANEURYSM REPAIR WITH HEMASHIELD PLATINUM GRAFT SIZE: 30MM, AND ON CARDIOPULMONARY PUMP OXYGENATOR  (INTRAOPERATIVE TRANSESOPHAGEAL ECHOCARDIOGRAM BY ANESTHESIOLOGIST);  Surgeon: Candis Torres MD;  Location:  OR        ALLERGIES   No Known Allergies    FAMILY HISTORY  History reviewed. No pertinent family history.      SOCIAL HISTORY  Social History     Socioeconomic History     Marital status:      Spouse name: Not on file     Number of children: Not on file     Years of education: Not on file     Highest education level: Not on file   Occupational History     Not on file   Tobacco Use     Smoking status: Never     Smokeless tobacco: Never   Substance and Sexual Activity     Alcohol use: Yes     Comment: occasional     Drug use: No     Sexual activity: Not on file   Other Topics Concern     Parent/sibling w/ CABG, MI or angioplasty before 65F 55M? Not Asked   Social History Narrative     Not on file     Social Drivers of Health     Financial Resource Strain: Not At Risk (2/27/2024)    Received from [facility]    Financial Resource Strain      Is it hard for you to pay for the very basics like food, housing, medical care or heating?: No   Food Insecurity: Not At Risk (2/27/2024)    Received from [facility]    Food Insecurity      Does your food run out before you have the money to buy more?: No   Transportation Needs: Not At Risk (2/27/2024)    Received from [facility]    Transportation Needs      Does a lack of transportation keep you from your medical appointments or from getting your medications?: No   Physical Activity: Not on file   Stress: Not on file   Social Connections: Not on file   Interpersonal Safety: Not on file   Housing Stability: Not on file       ROS:   Constitutional: No fever, chills, or sweats. No weight gain/loss   ENT: No visual disturbance, ear ache, epistaxis, sore throat  Allergies/Immunologic: Negative  Respiratory: No cough, hemoptysia  Cardiovascular: As per HPI  GI: No nausea, vomiting, hematemesis, melena, or hematochezia  : No urinary frequency, dysuria, or hematuria  Integument: Negative  Psychiatric: Negative  Neuro:  Negative  Endocrinology: Negative   Musculoskeletal: Negative  Vascular: No walking impairment, claudication, ischemic rest pain or nonhealing wounds    EXAM:  There were no vitals taken for this visit.  In general, the patient is a pleasant male in no apparent distress.    HEENT: NC/AT.  PERRLA.  EOMI.  Sclerae white, not injected.  Nares clear.  Pharynx without erythema or exudate.  Dentition intact.    Neck: No adenopathy.  No thyromegaly. Carotids +2/2 bilaterally without bruits.  No jugular venous distension.   Heart: RRR. Normal S1, S2 splits physiologically. No murmur, rub, click, or gallop. The PMI is in the 5th ICS in the midclavicular line. There is no heave.    Lungs: CTA.  No ronchi, wheezes, rales.  No dullness to percussion.   Abdomen: Soft, nontender, nondistended. No organomegaly. No AAA.  No bruits.   Extremities: No clubbing, cyanosis, or edema.  No wounds. No varicose veins signs of chronic venous insufficiency.   Vascular: No bruits are noted.    Labs:  LIPID RESULTS:  Lab Results   Component Value Date    CHOL 162 06/11/2019    HDL 58 06/11/2019    LDL 76 06/11/2019    TRIG 166 (H) 01/07/2022    TRIG 138 06/11/2019    NHDL 104 06/11/2019       LIVER ENZYME RESULTS:  Lab Results   Component Value Date    AST 27 06/01/2024    AST 17 05/24/2019    ALT 12 06/01/2024    ALT 39 05/24/2019       CBC RESULTS:  Lab Results   Component Value Date    WBC 6.9 06/03/2024    WBC 8.0 05/03/2019    RBC 2.66 (L) 06/03/2024    RBC 4.84 05/03/2019    HGB 8.0 (L) 06/03/2024    HGB 14.2 05/03/2019    HCT 23.8 (L) 06/03/2024    HCT 45.1 05/03/2019    MCV 90 06/03/2024    MCV 93 05/03/2019    MCH 30.1 06/03/2024    MCH 29.3 05/03/2019    MCHC 33.6 06/03/2024    MCHC 31.5 05/03/2019    RDW 12.6 06/03/2024    RDW 12.3 05/03/2019     (L) 06/03/2024     05/03/2019       BMP RESULTS:  Lab Results   Component Value Date     08/29/2024     05/24/2019    POTASSIUM 4.5 08/29/2024    POTASSIUM 4.0  05/31/2024    POTASSIUM 4.4 05/24/2019    CHLORIDE 102 08/29/2024    CHLORIDE 103 01/07/2022    CHLORIDE 105 05/24/2019    CO2 26 08/29/2024    CO2 28 05/24/2019    ANIONGAP 8 08/29/2024    ANIONGAP 5 05/24/2019     (H) 08/29/2024     (H) 06/03/2024    GLC 91 05/24/2019    BUN 16.7 08/29/2024    BUN 20 05/24/2019    CR 0.90 08/29/2024    CR 0.88 05/24/2019    GFRESTIMATED >90 08/29/2024    GFRESTIMATED >90 05/24/2019    GFRESTBLACK >90 05/24/2019    HALEY 9.3 08/29/2024    HALEY 9.5 05/24/2019        A1C RESULTS:  Lab Results   Component Value Date    A1C 5.5 04/16/2024         Thank you for allowing me to participate in the care of your patient.      Sincerely,     Aretha Arzate MD     [facility]  cc:   Zak Reardon, EDDIE  [id number] [id number]

## 2025-02-18 NOTE — PROGRESS NOTES
Vascular Cardiology       HPI:    This is a 59 year old male with a PMHx of HTN, Bicuspid Aortic Valve with TAA (5.0 cm on previous MRAs) who is here for follow up. He is s/p valve sparing aortic root repair for ascending aortic aneurysm. He underwent ascending aorta replacement with 30 mm Hemashield interposition graft by Dr. Torres on 5/31/24. Post-operatively he had atrial flutter which was self limited, but also a pericardial effusion that persisted.      Initial history: I had been following Robson for years. His MRA had showed stability of aorta for years, until scan in 2023 demonstrated new aortic growth. 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    ROS was negative for phenotypic features of syndromic TAAD except he had pectus deformity and some hypermobility. 6'0 feet tall. No flat feet. He underwent cardiac rehab for 3 weeks and did well. However heart rates were still elevated so post operative imaging performed demonstrating a pericardial effusion and CMR showed pericardial thickening. In past, he had a brief syncopal episode prompting an event monitor which demonstrated no sustained arrhythmias, no inappropriate sinus tachycardia, no afib and only an isolated episode of SVT (7 beats). Lowest HR was 46 bpm, no pauses. He had note of an IVCD/BBB but not present on EKG. No recurrent episodes.     He was placed on 3 month colchicine dosing and NSAID taper and did well, he still feels slightly deconditioned however and heart rates elevated, not back to full physical capacity. He denies chest pain, fevers, chills, ns, leg swelling, orthopnea, PND. No syncope. No recurrent AF/AFL that he knows of.     Of note recently quit job at the [employer] and works now for industry.        Assessment and Plan:      59 year old male with a PMHx of HTN, Bicupsid Aortic Valve with TAA (4.9  cm on MRA with slight growth on last MRA) who is now post operative ascending aortic repair, c/b atrial flutter self limited.  He underwent ascending aorta replacement with 30 mm Hemashield interposition graft by Dr. Torres on 5/31/24. Post-operatively he had atrial flutter which was self limited. Also with post pericardiectomy syndrome / pericarditis.      #Thoracic Aortic Aneurysm s/p 30 mm Hemashield graft 5/31/2024   -hereditary subtype, with high risk features including fam history of aneurysm disease but no h/o SCD or dissection.   -he did experience growth on this last scan therefore in shared decision making approach discussed surgery while his STS was low.   -genetic testing completed  -continue aspirin      #Bicupsid Aortic Valve  -Fusion of RCC and LCC   -No evidence of stenosis of regurgitation on ECHO  -Echo surveillance for valve function post operatively  -Children have completed screening, no BAV  -if valve degenerates over time will be candidate for TAVR most likely     #Elevated LDL and carotid plaque < 50% on L side  - Started lipitor 20 mg per day.  - Lipid panel and LFT surveillance per PCP     #Syncope:  -Vasovagal mediated, event monitor unremarkable for malignant arrhythmias or bradycardia  -Discussed autonomic dysfunction is very common in vascular CTD and behavioral modifications are often necessary, especially when on a beta blocker for aortic protection.      #Tachycardia:   -still with mildly elevated heart rates, echo with pericardial effusion likely due to post operative pericarditis  -checked CMR, confirmed pericardial thickening  -inflammatory markers today   -recheck echocardiogram for pericardial effusion resolution, if still ongoing and/or inflammatory markers are elevated would like to do repeat CMR and consider additional pericardial treatment     Aretha Arzate MD MSC     The longitudinal plan of care for the diagnosis(es)/condition(s) as documented were addressed during  this visit. Due to the added complexity in care, I will continue to support Robson in the subsequent management and with ongoing continuity of care.    Labs and echo images personally reviewed by me.    PAST MEDICAL HISTORY  Past Medical History:   Diagnosis Date    Aortic aneurysm     Ascending aortic aneurysm 03/01/2018    4.7 cm on echo    Bicuspid aortic valve     Congenital insufficiency of aortic valve     Glaucoma     HTN (hypertension)     Pectus excavatum        CURRENT MEDICATIONS  Current Outpatient Medications   Medication Sig Dispense Refill    amLODIPine (NORVASC) 5 MG tablet Take 1 tablet (5 mg) by mouth daily. 90 tablet 3    dorzolamide-timolol (COSOPT) 22.3-6.8 MG/ML ophthalmic solution Place 1 drop into both eyes 2 times daily      latanoprost (XALATAN) 0.005 % ophthalmic solution Place 1 drop into both eyes every evening      metoprolol succinate ER (TOPROL XL) 50 MG 24 hr tablet Take 1 tablet (50 mg) by mouth daily. 90 tablet 1       PAST SURGICAL HISTORY:  Past Surgical History:   Procedure Laterality Date    CV CORONARY ANGIOGRAM N/A 4/16/2024    Procedure: Coronary Angiogram;  Surgeon: Zach Suárez MD;  Location:  [facility]    CV LEFT HEART CATH N/A 4/16/2024    Procedure: Left Heart Catheterization;  Surgeon: Zach Suárez MD;  Location:  [facility]    REPAIR ANEURYSM ASCENDING AORTA N/A 5/31/2024    Procedure: ASCENDING AORTA ANEURYSM REPAIR WITH HEMASHIELD PLATINUM GRAFT SIZE: 30MM, AND ON CARDIOPULMONARY PUMP OXYGENATOR  (INTRAOPERATIVE TRANSESOPHAGEAL ECHOCARDIOGRAM BY ANESTHESIOLOGIST);  Surgeon: Candis Torres MD;  Location:  OR       ALLERGIES   No Known Allergies    FAMILY HISTORY  History reviewed. No pertinent family history.      SOCIAL HISTORY  Social History     Socioeconomic History    Marital status:      Spouse name: Not on file    Number of children: Not on file    Years of education: Not on file    Highest  education level: Not on file   Occupational History    Not on file   Tobacco Use    Smoking status: Never    Smokeless tobacco: Never   Substance and Sexual Activity    Alcohol use: Yes     Comment: occasional    Drug use: No    Sexual activity: Not on file   Other Topics Concern    Parent/sibling w/ CABG, MI or angioplasty before 65F 55M? Not Asked   Social History Narrative    Not on file     Social Drivers of Health     Financial Resource Strain: Not At Risk (2/27/2024)    Received from [facility]    Financial Resource Strain     Is it hard for you to pay for the very basics like food, housing, medical care or heating?: No   Food Insecurity: Not At Risk (2/27/2024)    Received from [facility]    Food Insecurity     Does your food run out before you have the money to buy more?: No   Transportation Needs: Not At Risk (2/27/2024)    Received from [facility]    Transportation Needs     Does a lack of transportation keep you from your medical appointments or from getting your medications?: No   Physical Activity: Not on file   Stress: Not on file   Social Connections: Not on file   Interpersonal Safety: Not on file   Housing Stability: Not on file       ROS:   Constitutional: No fever, chills, or sweats. No weight gain/loss   ENT: No visual disturbance, ear ache, epistaxis, sore throat  Allergies/Immunologic: Negative  Respiratory: No cough, hemoptysia  Cardiovascular: As per HPI  GI: No nausea, vomiting, hematemesis, melena, or hematochezia  : No urinary frequency, dysuria, or hematuria  Integument: Negative  Psychiatric: Negative  Neuro: Negative  Endocrinology: Negative   Musculoskeletal: Negative  Vascular: No walking impairment, claudication, ischemic rest pain or nonhealing wounds    EXAM:  There were no vitals taken for this visit.  In general, the patient is a pleasant male in no apparent distress.    HEENT: NC/AT.  PERRLA.  EOMI.  Sclerae white,  not injected.  Nares clear.  Pharynx without erythema or exudate.  Dentition intact.    Neck: No adenopathy.  No thyromegaly. Carotids +2/2 bilaterally without bruits.  No jugular venous distension.   Heart: RRR. Normal S1, S2 splits physiologically. No murmur, rub, click, or gallop. The PMI is in the 5th ICS in the midclavicular line. There is no heave.    Lungs: CTA.  No ronchi, wheezes, rales.  No dullness to percussion.   Abdomen: Soft, nontender, nondistended. No organomegaly. No AAA.  No bruits.   Extremities: No clubbing, cyanosis, or edema.  No wounds. No varicose veins signs of chronic venous insufficiency.   Vascular: No bruits are noted.    Labs:  LIPID RESULTS:  Lab Results   Component Value Date    CHOL 162 06/11/2019    HDL 58 06/11/2019    LDL 76 06/11/2019    TRIG 166 (H) 01/07/2022    TRIG 138 06/11/2019    NHDL 104 06/11/2019       LIVER ENZYME RESULTS:  Lab Results   Component Value Date    AST 27 06/01/2024    AST 17 05/24/2019    ALT 12 06/01/2024    ALT 39 05/24/2019       CBC RESULTS:  Lab Results   Component Value Date    WBC 6.9 06/03/2024    WBC 8.0 05/03/2019    RBC 2.66 (L) 06/03/2024    RBC 4.84 05/03/2019    HGB 8.0 (L) 06/03/2024    HGB 14.2 05/03/2019    HCT 23.8 (L) 06/03/2024    HCT 45.1 05/03/2019    MCV 90 06/03/2024    MCV 93 05/03/2019    MCH 30.1 06/03/2024    MCH 29.3 05/03/2019    MCHC 33.6 06/03/2024    MCHC 31.5 05/03/2019    RDW 12.6 06/03/2024    RDW 12.3 05/03/2019     (L) 06/03/2024     05/03/2019       BMP RESULTS:  Lab Results   Component Value Date     08/29/2024     05/24/2019    POTASSIUM 4.5 08/29/2024    POTASSIUM 4.0 05/31/2024    POTASSIUM 4.4 05/24/2019    CHLORIDE 102 08/29/2024    CHLORIDE 103 01/07/2022    CHLORIDE 105 05/24/2019    CO2 26 08/29/2024    CO2 28 05/24/2019    ANIONGAP 8 08/29/2024    ANIONGAP 5 05/24/2019     (H) 08/29/2024     (H) 06/03/2024    GLC 91 05/24/2019    BUN 16.7 08/29/2024    BUN 20  05/24/2019    CR 0.90 08/29/2024    CR 0.88 05/24/2019    GFRESTIMATED >90 08/29/2024    GFRESTIMATED >90 05/24/2019    GFRESTBLACK >90 05/24/2019    HALEY 9.3 08/29/2024    HALEY 9.5 05/24/2019        A1C RESULTS:  Lab Results   Component Value Date    A1C 5.5 04/16/2024

## 2025-03-31 ENCOUNTER — HOSPITAL ENCOUNTER (OUTPATIENT)
Dept: CARDIOLOGY | Facility: CLINIC | Age: 60
Discharge: HOME | End: 2025-03-31
Attending: INTERNAL MEDICINE | Admitting: INTERNAL MEDICINE
Payer: COMMERCIAL

## 2025-03-31 DIAGNOSIS — Z98.890 HX OF ASCENDING AORTA REPAIR: ICD-10-CM

## 2025-03-31 DIAGNOSIS — I30.0 IDIOPATHIC PERICARDITIS, UNSPECIFIED CHRONICITY: ICD-10-CM

## 2025-03-31 LAB — LVEF ECHO: NORMAL

## 2025-03-31 PROCEDURE — 93306 TTE W/DOPPLER COMPLETE: CPT | Mod: 26 | Performed by: INTERNAL MEDICINE

## 2025-03-31 PROCEDURE — 93306 TTE W/DOPPLER COMPLETE: CPT

## 2025-05-20 DIAGNOSIS — Z98.890 HX OF ASCENDING AORTA REPAIR: ICD-10-CM

## 2025-05-20 DIAGNOSIS — I10 ESSENTIAL HYPERTENSION: ICD-10-CM

## 2025-05-20 DIAGNOSIS — Q23.81 BICUSPID AORTIC VALVE: ICD-10-CM

## 2025-05-20 DIAGNOSIS — Z98.890 STATUS POST ASCENDING AORTIC ANEURYSM REPAIR: ICD-10-CM

## 2025-05-20 DIAGNOSIS — Z86.79 STATUS POST ASCENDING AORTIC ANEURYSM REPAIR: ICD-10-CM

## 2025-05-20 RX ORDER — METOPROLOL SUCCINATE 50 MG/1
50 TABLET, EXTENDED RELEASE ORAL DAILY
Qty: 90 TABLET | Refills: 3 | Status: SHIPPED | OUTPATIENT
Start: 2025-05-20

## 2025-07-13 ENCOUNTER — HEALTH MAINTENANCE LETTER (OUTPATIENT)
Age: 60
End: 2025-07-13

## (undated) DEVICE — DRAIN CHEST TUBE 32FR STR 8032

## (undated) DEVICE — SU MONOCRYL 4-0 PS-1 27'  UND Y935H

## (undated) DEVICE — SU VICRYL 0 CTX 36" J370H

## (undated) DEVICE — Device

## (undated) DEVICE — SU PROLENE 3-0 SHDA 36" 8522H

## (undated) DEVICE — DECANTER BAG 2002S

## (undated) DEVICE — KIT HAND CONTROL ANGIOTOUCH ACIST 65CM AT-P65

## (undated) DEVICE — CANNULA PERFUSION VENOUS 3 STAGE 29FR 15" 91429

## (undated) DEVICE — PROTECTOR ARM ONE-STEP TRENDELENBURG 40418

## (undated) DEVICE — RX SURGIFLO HEMOSTATIC MATRIX W/THROMBIN 8ML 2994

## (undated) DEVICE — SU PROLENE 6-0 C-1DA 30" 8706H

## (undated) DEVICE — BONE WAX OSTENE 3.5GM CT410

## (undated) DEVICE — COVER TABLE POLY 65X90" 8186

## (undated) DEVICE — CONNECTOR PERFUSION Y 3/8X3/8" W/O LL 6031

## (undated) DEVICE — SUCTION TIP YANKAUER W/O VENT K86

## (undated) DEVICE — SU PROLENE 4-0 SHDA 36" 8521H

## (undated) DEVICE — DRAIN SUMP INTRACARDIAC 20FR 12" POOL TIP 12112

## (undated) DEVICE — OXYGENATOR PERFUSION AFFINITY FUSION BB841

## (undated) DEVICE — SU VICRYL 3-0 FS-1 27" J442H

## (undated) DEVICE — SUCTION CANISTER MEDIVAC LINER 3000ML W/LID 65651-530

## (undated) DEVICE — LEAD PACER MYOCARDIAL BIPOLAR TEMPORARY 53CM 6495F

## (undated) DEVICE — SU ETHIBOND 0 CT-1 CR 8X18" CX21D

## (undated) DEVICE — INTRO SHEATH 6FRX10CM PINNACLE RSS602

## (undated) DEVICE — PUMP CP37 QUANTUM CENTRIFUGAL BLOOD CP37V-V0

## (undated) DEVICE — CONNECTOR DRAIN CHEST Y EXTENSION SET 19909

## (undated) DEVICE — LINEN TOWEL PACK X5 5464

## (undated) DEVICE — KIT WASH CELL SAVING ATL2001

## (undated) DEVICE — PACK SURGICAL PROCEDURE CUSTOM 1/2IN X 3/8IN BB11W18R1

## (undated) DEVICE — DEFIB PRO-PADZ LVP LQD GEL ADULT 8900-2105-01

## (undated) DEVICE — DRAPE NEW SLUSH/WARMER HUSHSLUSH 2.0 ESD340 ESD340

## (undated) DEVICE — DRAIN CHEST TUBE RIGHT ANGLED 32FR 8132

## (undated) DEVICE — TOTE ANGIO CORP PC15AT SAN32CC83O

## (undated) DEVICE — SU STEEL 6 CCS 4X18" M654G

## (undated) DEVICE — CABLE MYO/LEAD PACING WHITE DISP 019-530

## (undated) DEVICE — DRAIN CHEST TUBE 28FR STR 8028

## (undated) DEVICE — CATH ANGIO INFINITI 3DRC 6FRX100CM 534676T

## (undated) DEVICE — TOURNIQUET VASCULAR

## (undated) DEVICE — PACK OPEN HEART PV12OH524

## (undated) DEVICE — VALVE VRV 9156R2

## (undated) DEVICE — SU MYOSTERNAL WIRE  046-267

## (undated) DEVICE — CANNULA PERFUSION 14FRX16" LEFT 12016

## (undated) DEVICE — SUCTION DRY CHEST DRAIN OASIS 3600-100

## (undated) DEVICE — MANIFOLD KIT ANGIO AUTOMATED 014613

## (undated) DEVICE — LINEN TOWEL PACK X6 WHITE 5487

## (undated) DEVICE — LINEN LEG ROLL 5489

## (undated) DEVICE — SPONGE RAY-TEC 4X8" 7318

## (undated) DEVICE — ADH BIOGLUE CARTRIDGE 10ML BG3510-5-US

## (undated) DEVICE — DEVICE ASSEMBLY SUCTION/ANTI COAG BTC93

## (undated) DEVICE — CLOSURE DEVICE 6FR VASC PROGLIDE MEDICATED SUTURE 12673-03

## (undated) DEVICE — SU ETHIBOND 3-0 BBDA 36" X588H

## (undated) DEVICE — SOL NACL 0.9% IRRIG 1000ML BOTTLE 2F7124

## (undated) DEVICE — LINEN TOWEL PACK X30 5481

## (undated) DEVICE — PACK SURGICAL PROCEDURE CUSTOM 3/8IN X 3/8IN BB11W21R

## (undated) DEVICE — SU PROLENE 4-0 RB-1DA 36" 8557H

## (undated) DEVICE — SU PLEDGET SOFT TFE 3/8"X3/26"X1/16" PCP40

## (undated) DEVICE — SURGICEL HEMOSTAT 2X14" 1951

## (undated) DEVICE — RESERVOIR CELL SAVING BLOOD COLLECTION EL2120

## (undated) DEVICE — CANNULA PERFUSION 22FR ARTERIAL SPC206722

## (undated) DEVICE — CONNECTOR PREFUSION REDUCER 3/8X1/2" W/O LL 6013

## (undated) DEVICE — RAD INTRODUCER KIT MICRO 5FRX10CM .018 NITINOL G/W

## (undated) DEVICE — COVER LIGHT HANDLE  HILL-ROM C LT-C02

## (undated) DEVICE — SU PROLENE 3-0 SHDA 48" 8534H

## (undated) DEVICE — SOL WATER IRRIG 1000ML BOTTLE 2F7114

## (undated) DEVICE — SU SILK 1 TIE 10X30" SA87G

## (undated) DEVICE — SU ETHIBOND 2-0 SHDA 30" X563H

## (undated) DEVICE — CATH ANGIO JUDKINS JL5 6FRX100CM INFINITI 534622T

## (undated) DEVICE — CANNULA PERFUSION AORTIC ROOT 22FR 20012

## (undated) RX ORDER — POTASSIUM CHLORIDE 1500 MG/1
TABLET, EXTENDED RELEASE ORAL
Status: DISPENSED
Start: 2024-04-16

## (undated) RX ORDER — PROTAMINE SULFATE 10 MG/ML
INJECTION, SOLUTION INTRAVENOUS
Status: DISPENSED
Start: 2024-05-31

## (undated) RX ORDER — HEPARIN SODIUM 200 [USP'U]/100ML
INJECTION, SOLUTION INTRAVENOUS
Status: DISPENSED
Start: 2024-04-16

## (undated) RX ORDER — CEFAZOLIN SODIUM 1 G/3ML
INJECTION, POWDER, FOR SOLUTION INTRAMUSCULAR; INTRAVENOUS
Status: DISPENSED
Start: 2024-05-31

## (undated) RX ORDER — FENTANYL CITRATE 50 UG/ML
INJECTION, SOLUTION INTRAMUSCULAR; INTRAVENOUS
Status: DISPENSED
Start: 2024-05-31

## (undated) RX ORDER — FAMOTIDINE 20 MG/1
TABLET, FILM COATED ORAL
Status: DISPENSED
Start: 2024-05-31

## (undated) RX ORDER — VECURONIUM BROMIDE 1 MG/ML
INJECTION, POWDER, LYOPHILIZED, FOR SOLUTION INTRAVENOUS
Status: DISPENSED
Start: 2024-05-31

## (undated) RX ORDER — FENTANYL CITRATE 0.05 MG/ML
INJECTION, SOLUTION INTRAMUSCULAR; INTRAVENOUS
Status: DISPENSED
Start: 2024-05-31

## (undated) RX ORDER — LIDOCAINE HYDROCHLORIDE 10 MG/ML
INJECTION, SOLUTION EPIDURAL; INFILTRATION; INTRACAUDAL; PERINEURAL
Status: DISPENSED
Start: 2024-04-16

## (undated) RX ORDER — PROPOFOL 10 MG/ML
INJECTION, EMULSION INTRAVENOUS
Status: DISPENSED
Start: 2024-05-31

## (undated) RX ORDER — DEXMEDETOMIDINE HYDROCHLORIDE 4 UG/ML
INJECTION, SOLUTION INTRAVENOUS
Status: DISPENSED
Start: 2024-05-31

## (undated) RX ORDER — HEPARIN SODIUM 1000 [USP'U]/ML
INJECTION, SOLUTION INTRAVENOUS; SUBCUTANEOUS
Status: DISPENSED
Start: 2024-05-31

## (undated) RX ORDER — HEPARIN SODIUM 1000 [USP'U]/ML
INJECTION, SOLUTION INTRAVENOUS; SUBCUTANEOUS
Status: DISPENSED
Start: 2024-04-16

## (undated) RX ORDER — FENTANYL CITRATE 50 UG/ML
INJECTION, SOLUTION INTRAMUSCULAR; INTRAVENOUS
Status: DISPENSED
Start: 2024-04-16

## (undated) RX ORDER — VANCOMYCIN HYDROCHLORIDE 1 G/200ML
INJECTION, SOLUTION INTRAVENOUS
Status: DISPENSED
Start: 2024-05-31

## (undated) RX ORDER — CHLORHEXIDINE GLUCONATE ORAL RINSE 1.2 MG/ML
SOLUTION DENTAL
Status: DISPENSED
Start: 2024-05-31